# Patient Record
Sex: FEMALE | Race: ASIAN
[De-identification: names, ages, dates, MRNs, and addresses within clinical notes are randomized per-mention and may not be internally consistent; named-entity substitution may affect disease eponyms.]

---

## 2017-03-07 ENCOUNTER — TRANSCRIPTION ENCOUNTER (OUTPATIENT)
Age: 72
End: 2017-03-07

## 2018-02-01 ENCOUNTER — OUTPATIENT (OUTPATIENT)
Dept: OUTPATIENT SERVICES | Facility: HOSPITAL | Age: 73
LOS: 1 days | End: 2018-02-01
Payer: MEDICAID

## 2018-02-01 DIAGNOSIS — Z90.710 ACQUIRED ABSENCE OF BOTH CERVIX AND UTERUS: Chronic | ICD-10-CM

## 2018-02-01 PROCEDURE — G9001: CPT

## 2018-02-17 ENCOUNTER — INPATIENT (INPATIENT)
Facility: HOSPITAL | Age: 73
LOS: 2 days | Discharge: HOME HEALTH | End: 2018-02-20
Attending: HOSPITALIST | Admitting: HOSPITALIST
Payer: MEDICARE

## 2018-02-17 VITALS
WEIGHT: 209 LBS | RESPIRATION RATE: 22 BRPM | TEMPERATURE: 98 F | SYSTOLIC BLOOD PRESSURE: 172 MMHG | DIASTOLIC BLOOD PRESSURE: 65 MMHG | OXYGEN SATURATION: 100 % | HEART RATE: 80 BPM

## 2018-02-17 DIAGNOSIS — Z90.710 ACQUIRED ABSENCE OF BOTH CERVIX AND UTERUS: Chronic | ICD-10-CM

## 2018-02-17 LAB
ALBUMIN SERPL ELPH-MCNC: 3.8 G/DL — SIGNIFICANT CHANGE UP (ref 3.3–5)
ALP SERPL-CCNC: 110 U/L — SIGNIFICANT CHANGE UP (ref 40–120)
ALT FLD-CCNC: 20 U/L — SIGNIFICANT CHANGE UP (ref 12–78)
ANION GAP SERPL CALC-SCNC: 10 MMOL/L — SIGNIFICANT CHANGE UP (ref 5–17)
ANISOCYTOSIS BLD QL: SLIGHT — SIGNIFICANT CHANGE UP
APTT BLD: 41.5 SEC — HIGH (ref 27.5–37.4)
AST SERPL-CCNC: 17 U/L — SIGNIFICANT CHANGE UP (ref 15–37)
BASOPHILS # BLD AUTO: 0.1 K/UL — SIGNIFICANT CHANGE UP (ref 0–0.2)
BASOPHILS NFR BLD AUTO: 0.9 % — SIGNIFICANT CHANGE UP (ref 0–2)
BILIRUB SERPL-MCNC: 0.3 MG/DL — SIGNIFICANT CHANGE UP (ref 0.2–1.2)
BLD GP AB SCN SERPL QL: SIGNIFICANT CHANGE UP
BUN SERPL-MCNC: 20 MG/DL — SIGNIFICANT CHANGE UP (ref 7–23)
CALCIUM SERPL-MCNC: 9 MG/DL — SIGNIFICANT CHANGE UP (ref 8.5–10.1)
CHLORIDE SERPL-SCNC: 105 MMOL/L — SIGNIFICANT CHANGE UP (ref 96–108)
CO2 SERPL-SCNC: 24 MMOL/L — SIGNIFICANT CHANGE UP (ref 22–31)
CREAT SERPL-MCNC: 0.8 MG/DL — SIGNIFICANT CHANGE UP (ref 0.5–1.3)
ELLIPTOCYTES BLD QL SMEAR: SLIGHT — SIGNIFICANT CHANGE UP
EOSINOPHIL # BLD AUTO: 0.2 K/UL — SIGNIFICANT CHANGE UP (ref 0–0.5)
EOSINOPHIL NFR BLD AUTO: 1 % — SIGNIFICANT CHANGE UP (ref 0–6)
ETHANOL SERPL-MCNC: <10 MG/DL — SIGNIFICANT CHANGE UP (ref 0–10)
GLUCOSE SERPL-MCNC: 155 MG/DL — HIGH (ref 70–99)
HCT VFR BLD CALC: 42.8 % — SIGNIFICANT CHANGE UP (ref 34.5–45)
HGB BLD-MCNC: 12.5 G/DL — SIGNIFICANT CHANGE UP (ref 11.5–15.5)
HYPOCHROMIA BLD QL: SLIGHT — SIGNIFICANT CHANGE UP
INR BLD: 2.57 RATIO — HIGH (ref 0.88–1.16)
LYMPHOCYTES # BLD AUTO: 29 % — SIGNIFICANT CHANGE UP (ref 13–44)
LYMPHOCYTES # BLD AUTO: 6.7 K/UL — HIGH (ref 1–3.3)
MAGNESIUM SERPL-MCNC: 1.9 MG/DL — SIGNIFICANT CHANGE UP (ref 1.6–2.6)
MANUAL DIF COMMENT BLD-IMP: SIGNIFICANT CHANGE UP
MCHC RBC-ENTMCNC: 19.3 PG — LOW (ref 27–34)
MCHC RBC-ENTMCNC: 29.1 GM/DL — LOW (ref 32–36)
MCV RBC AUTO: 66.1 FL — LOW (ref 80–100)
MICROCYTES BLD QL: SLIGHT — SIGNIFICANT CHANGE UP
MONOCYTES # BLD AUTO: 0.8 K/UL — SIGNIFICANT CHANGE UP (ref 0–0.9)
MONOCYTES NFR BLD AUTO: 2 % — SIGNIFICANT CHANGE UP (ref 2–14)
NEUTROPHILS # BLD AUTO: 5.4 K/UL — SIGNIFICANT CHANGE UP (ref 1.8–7.4)
NEUTROPHILS NFR BLD AUTO: 35 % — LOW (ref 43–77)
PLAT MORPH BLD: NORMAL — SIGNIFICANT CHANGE UP
PLATELET # BLD AUTO: 292 K/UL — SIGNIFICANT CHANGE UP (ref 150–400)
POIKILOCYTOSIS BLD QL AUTO: SLIGHT — SIGNIFICANT CHANGE UP
POTASSIUM SERPL-MCNC: 3.3 MMOL/L — LOW (ref 3.5–5.3)
POTASSIUM SERPL-SCNC: 3.3 MMOL/L — LOW (ref 3.5–5.3)
PROT SERPL-MCNC: 7.7 GM/DL — SIGNIFICANT CHANGE UP (ref 6–8.3)
PROTHROM AB SERPL-ACNC: 28.3 SEC — HIGH (ref 9.8–12.7)
RBC # BLD: 6.47 M/UL — HIGH (ref 3.8–5.2)
RBC # FLD: 13.1 % — SIGNIFICANT CHANGE UP (ref 10.3–14.5)
RBC BLD AUTO: (no result)
SCHISTOCYTES BLD QL AUTO: SLIGHT — SIGNIFICANT CHANGE UP
SODIUM SERPL-SCNC: 139 MMOL/L — SIGNIFICANT CHANGE UP (ref 135–145)
TROPONIN I SERPL-MCNC: <0.015 NG/ML — SIGNIFICANT CHANGE UP (ref 0.01–0.04)
TYPE + AB SCN PNL BLD: SIGNIFICANT CHANGE UP
VARIANT LYMPHS # BLD: 33 % — HIGH (ref 0–6)
WBC # BLD: 13.2 K/UL — HIGH (ref 3.8–10.5)
WBC # FLD AUTO: 13.2 K/UL — HIGH (ref 3.8–10.5)

## 2018-02-17 PROCEDURE — 70496 CT ANGIOGRAPHY HEAD: CPT | Mod: 26

## 2018-02-17 PROCEDURE — 70450 CT HEAD/BRAIN W/O DYE: CPT | Mod: 26

## 2018-02-17 PROCEDURE — 71045 X-RAY EXAM CHEST 1 VIEW: CPT | Mod: 26

## 2018-02-17 PROCEDURE — 93010 ELECTROCARDIOGRAM REPORT: CPT

## 2018-02-17 NOTE — ED ADULT NURSE NOTE - OBJECTIVE STATEMENT
Pt presents to the ED with multiple symptoms. As per pt family at approx 8PM pt took nyquil and went to bed. Pt awoke and was having numbness to the hands bilaterally. Pt in route to ED states to family that she was having "difficulty speaking" but was able to verbalize the difficulty. Upon arrival to ED pt was unable to get out of car and slid when attempting to do so. Upon assessment pt right leg was weaker than the left proximally.

## 2018-02-17 NOTE — ED ADULT TRIAGE NOTE - CHIEF COMPLAINT QUOTE
family came in running asking for wheelchair, then ran passed triage trying to get into main ED.  as per family they believe patient is having a stroke.  daughter states pt has not been feeling well, took Nyquil this evening and approx. 30 min ago speech became unclear.  as per family pt comprehends everything you are saying.  pt directly bedded into main with Dr. Toribio at bedside.

## 2018-02-17 NOTE — ED PROVIDER NOTE - OBJECTIVE STATEMENT
72 y/o F with a PMhx of HTN, DVT on Coumadin presents to the ED with family at bedside who states that she recently took Nyquil 30mins PTA. Pt family states that her speech became difficult to understand, concerned that she was having a stroke, drove her to the ED. Upon ED arrival pt family was attempting to move her into a wheelchair when she lost her strength and was lowered to the ground. Pt unable to provide adequate hx at this time, language barrier present with no other acute c/o at this time.

## 2018-02-17 NOTE — ED PROVIDER NOTE - PROGRESS NOTE DETAILS
d/w dr cano, pt to have CTA to look for thrombus d/w dr cano, CTa shows no thrombus, will admit to HH d/w hospitalist, Dr Escobedo

## 2018-02-17 NOTE — ED ADULT NURSE REASSESSMENT NOTE - NS ED NURSE REASSESS COMMENT FT1
2242- pt stated that she was "feeling like I am losing the energy from my body." MD Toribio re-evaluated pt when having these complaints and updated family in regards to current status and plan of care. Pt family remained confused and MD Toribio re-explained the results and plan of care and family then verbalized understanding of information.   2252- MD Toribio spoke with pt family in regards to lab results at this time. MD Toribio on phone with Neurologist, MD Quintana, in regards to pt current status and results at this time. Pt to have CT following additional lab results. Pt remains on bedside monitor. Warm blankets provided.

## 2018-02-18 LAB
APPEARANCE UR: CLEAR — SIGNIFICANT CHANGE UP
BILIRUB UR-MCNC: NEGATIVE — SIGNIFICANT CHANGE UP
COLOR SPEC: YELLOW — SIGNIFICANT CHANGE UP
DIFF PNL FLD: NEGATIVE — SIGNIFICANT CHANGE UP
GLUCOSE UR QL: NEGATIVE MG/DL — SIGNIFICANT CHANGE UP
INR BLD: 2.88 RATIO — HIGH (ref 0.88–1.16)
KETONES UR-MCNC: NEGATIVE — SIGNIFICANT CHANGE UP
LEUKOCYTE ESTERASE UR-ACNC: NEGATIVE — SIGNIFICANT CHANGE UP
NITRITE UR-MCNC: NEGATIVE — SIGNIFICANT CHANGE UP
PH UR: 5 — SIGNIFICANT CHANGE UP (ref 5–8)
PROT UR-MCNC: NEGATIVE MG/DL — SIGNIFICANT CHANGE UP
PROTHROM AB SERPL-ACNC: 31.8 SEC — HIGH (ref 9.8–12.7)
SP GR SPEC: 1.01 — SIGNIFICANT CHANGE UP (ref 1.01–1.02)
TROPONIN I SERPL-MCNC: 0.07 NG/ML — HIGH (ref 0.01–0.04)
TROPONIN I SERPL-MCNC: 0.07 NG/ML — HIGH (ref 0.01–0.04)
TROPONIN I SERPL-MCNC: 0.08 NG/ML — HIGH (ref 0.01–0.04)
UROBILINOGEN FLD QL: NEGATIVE MG/DL — SIGNIFICANT CHANGE UP

## 2018-02-18 PROCEDURE — 99285 EMERGENCY DEPT VISIT HI MDM: CPT

## 2018-02-18 PROCEDURE — 99223 1ST HOSP IP/OBS HIGH 75: CPT

## 2018-02-18 PROCEDURE — 70498 CT ANGIOGRAPHY NECK: CPT | Mod: 26

## 2018-02-18 PROCEDURE — 70551 MRI BRAIN STEM W/O DYE: CPT | Mod: 26

## 2018-02-18 PROCEDURE — 71045 X-RAY EXAM CHEST 1 VIEW: CPT | Mod: 26

## 2018-02-18 PROCEDURE — 93010 ELECTROCARDIOGRAM REPORT: CPT

## 2018-02-18 RX ORDER — WARFARIN SODIUM 2.5 MG/1
2 TABLET ORAL DAILY
Qty: 0 | Refills: 0 | Status: DISCONTINUED | OUTPATIENT
Start: 2018-02-18 | End: 2018-02-18

## 2018-02-18 RX ORDER — ASPIRIN/CALCIUM CARB/MAGNESIUM 324 MG
81 TABLET ORAL DAILY
Qty: 0 | Refills: 0 | Status: DISCONTINUED | OUTPATIENT
Start: 2018-02-18 | End: 2018-02-20

## 2018-02-18 RX ORDER — ATORVASTATIN CALCIUM 80 MG/1
80 TABLET, FILM COATED ORAL AT BEDTIME
Qty: 0 | Refills: 0 | Status: DISCONTINUED | OUTPATIENT
Start: 2018-02-18 | End: 2018-02-20

## 2018-02-18 RX ORDER — PANTOPRAZOLE SODIUM 20 MG/1
40 TABLET, DELAYED RELEASE ORAL
Qty: 0 | Refills: 0 | Status: DISCONTINUED | OUTPATIENT
Start: 2018-02-18 | End: 2018-02-20

## 2018-02-18 RX ORDER — POTASSIUM CHLORIDE 20 MEQ
40 PACKET (EA) ORAL ONCE
Qty: 0 | Refills: 0 | Status: COMPLETED | OUTPATIENT
Start: 2018-02-18 | End: 2018-02-18

## 2018-02-18 RX ORDER — ATORVASTATIN CALCIUM 80 MG/1
80 TABLET, FILM COATED ORAL ONCE
Qty: 0 | Refills: 0 | Status: COMPLETED | OUTPATIENT
Start: 2018-02-18 | End: 2018-02-18

## 2018-02-18 RX ORDER — ASPIRIN/CALCIUM CARB/MAGNESIUM 324 MG
325 TABLET ORAL ONCE
Qty: 0 | Refills: 0 | Status: COMPLETED | OUTPATIENT
Start: 2018-02-18 | End: 2018-02-18

## 2018-02-18 RX ORDER — WARFARIN SODIUM 2.5 MG/1
2 TABLET ORAL DAILY
Qty: 0 | Refills: 0 | Status: DISCONTINUED | OUTPATIENT
Start: 2018-02-18 | End: 2018-02-20

## 2018-02-18 RX ORDER — ALPRAZOLAM 0.25 MG
0.25 TABLET ORAL ONCE
Qty: 0 | Refills: 0 | Status: DISCONTINUED | OUTPATIENT
Start: 2018-02-18 | End: 2018-02-18

## 2018-02-18 RX ADMIN — WARFARIN SODIUM 2 MILLIGRAM(S): 2.5 TABLET ORAL at 22:31

## 2018-02-18 RX ADMIN — Medication 40 MILLIEQUIVALENT(S): at 04:00

## 2018-02-18 RX ADMIN — Medication 81 MILLIGRAM(S): at 11:46

## 2018-02-18 RX ADMIN — ATORVASTATIN CALCIUM 80 MILLIGRAM(S): 80 TABLET, FILM COATED ORAL at 22:30

## 2018-02-18 RX ADMIN — Medication 0.25 MILLIGRAM(S): at 11:46

## 2018-02-18 RX ADMIN — PANTOPRAZOLE SODIUM 40 MILLIGRAM(S): 20 TABLET, DELAYED RELEASE ORAL at 08:43

## 2018-02-18 RX ADMIN — Medication 100 MILLIGRAM(S): at 22:30

## 2018-02-18 NOTE — PATIENT PROFILE ADULT. - REASON FOR ADMISSION
Patient stated she took Nyquil for cough, then her son came to see her and she could not get her words out, her tongue was numb and her hands and feet was numb.

## 2018-02-18 NOTE — SWALLOW BEDSIDE ASSESSMENT ADULT - COMMENTS
The patient was admitted to  with onset of right sided weakness and difficulties expressing self which are improving. Pt also noted to have hypokalemia and elevated troponins.  This profile is superimposed upon a history of a recent non nutritive cough for which  has been taking Nyquil, HTN, GERD, past DVT and prior stomach surgery NOS. The patient was admitted to  with onset of right sided weakness and difficulties expressing self which are improving. Pt also noted to have hypokalemia and elevated troponins.  This profile is superimposed upon a history of a recent non nutritive cough for which she has been taking Nyquil, HTN, GERD, past DVT and prior stomach surgery NOS.

## 2018-02-18 NOTE — SWALLOW BEDSIDE ASSESSMENT ADULT - SWALLOW EVAL: RECOMMENDED DIET
SUGGEST A REGULAR TEXTURE DIET WITH THIN LIQUID CONSISTENCIES AS PATIENT APPEARS TO TOLERATE SAME FROM AN OROPHARYNGEAL SWALLOWING STANCE ON EXAM.

## 2018-02-18 NOTE — PHYSICAL THERAPY INITIAL EVALUATION ADULT - DISCHARGE DISPOSITION, PT EVAL
no skilled PT needs/home/Pt. will be d/c from PT at present time due to performing transfers and ambulating w/ RW w/ supervision/independently.

## 2018-02-18 NOTE — CONSULT NOTE ADULT - SUBJECTIVE AND OBJECTIVE BOX
Patient is a 73y old  Female who presents with a chief complaint of Patient stated she took Nyquil for cough, then her son came to see her and she could not get her words out, her tongue was numb and her hands and feet was numb with Rt side body weak lasted for 1-2 hrs yseterday      HPI:  72 y/o F presented to the ED with family at bedside who states that she took Nyquil 30mins Prior to the event for dry cough. Pt family stated that her speech became difficult to understand, concerned that she was having a stroke, drove her to the ED. Upon ED arrival pt family was attempting to move her into a wheelchair when she lost her strength and was lowered to the ground. As per the patient while in route in car her right side of the body got weaker. Her right LE was worse then her right UE. Left side had no weakness.    weakness resolved later except persistent  she still feel slightly numb in the right lower leg area which resolved later. No bowel or bladder incontinence. She denies any chest pain, any sob or any headache. No recurrent sx today. OOB ambulated with PT. CT and CTA reported neg for acute pathology.     PMHx:  HTN  DVT of the LLE  GERD (18 Feb 2018 02:35)      PAST MEDICAL & SURGICAL HISTORY:  DVT (deep venous thrombosis)  HTN (hypertension)  S/P hysterectomy      FAMILY HISTORY:  No pertinent family history in first degree relatives      Social Hx:  Nonsmoker, no drug or alcohol use    MEDICATIONS  (STANDING):  aspirin  chewable 81 milliGRAM(s) Oral daily  atorvastatin 80 milliGRAM(s) Oral at bedtime  pantoprazole    Tablet 40 milliGRAM(s) Oral before breakfast  warfarin 2 milliGRAM(s) Oral daily       Allergies    penicillin (Rash)    ROS: Pertinent positives in HPI, all other ROS were reviewed and are negative.      Vital Signs Last 24 Hrs  T(C): 36.2 (18 Feb 2018 03:58), Max: 36.9 (18 Feb 2018 02:30)  T(F): 97.2 (18 Feb 2018 03:58), Max: 98.4 (18 Feb 2018 02:30)  HR: 57 (18 Feb 2018 08:00) (52 - 91)  BP: 142/60 (18 Feb 2018 08:00) (117/80 - 172/65)  BP(mean): 77 (18 Feb 2018 08:00) (75 - 104)  RR: 15 (18 Feb 2018 08:00) (13 - 26)  SpO2: 94% (18 Feb 2018 08:00) (94% - 100%)        Constitutional: awake and alert.  HEENT: PERRLA, EOMI,   Neck: Supple.  Respiratory: Breath sounds are clear bilaterally  Cardiovascular: S1 and S2, regular  rhythm  Gastrointestinal: soft, nontender  Extremities:  no edema  Vascular:  Carotid Bruit - no  Musculoskeletal: no joint swelling/tenderness, no abnormal movements  Skin: No rashes    Neurological exam:  HF: A x O x 3. Appropriately interactive, normal affect. Speech fluent, No Aphasia  speaks Divehi, but understands simple english.   CN: MONALISA, EOMI, VFF, facial sensation normal, no NLFD, tongue midline, gag intact.  Motor: No pronator drift, Strength 5/5 in all 4 ext, normal bulk and tone.    Sens: Intact to light touch .   Reflexes: Symmetric and normal . BJ 2+, BR 2+, KJ 2+, AJ 1+, downgoing toes b/l  Coord:  No dysmetria, MAINE intact   Gait/Balance: Normal  with assistance and supervision with PT    NIHSS: 0      Labs:   02-18    144  |  114<H>  |  15  ----------------------------<  87  4.1   |  23  |  0.59    Ca    8.4<L>      18 Feb 2018 07:56  Mg     2.0     02-18    TPro  7.7  /  Alb  3.8  /  TBili  0.3  /  DBili  x   /  AST  17  /  ALT  20  /  AlkPhos  110  02-17                              12.5   13.2  )-----------( 292      ( 17 Feb 2018 22:15 )             42.8       Radiology:  - CT Head:2/17/18    IMPRESSION:  No acute intracranial hemorrhage, mass effect from vasogenic edema or   evidence for large vascular territory infarct.    CTA Of BRAIN/CAROTIDS 2/17/18        IMPRESSION:    No acute intracranial pathology.    No vessel occlusion, aneurysm, or AVM in the brain.    No occlusion or hemodynamically significant stenosis in the neck.    >

## 2018-02-18 NOTE — SWALLOW BEDSIDE ASSESSMENT ADULT - ASR SWALLOW RECOMMEND DIAG
DEFER MBS AS PT APPEARED CLINICALLY TOLERANT OF SUGGESTED PO TEXTURES FROM AN OROPHARYNGEAL SWALLOWING STANCE ON CLINICAL EXAM.

## 2018-02-18 NOTE — H&P ADULT - NEUROLOGICAL DETAILS
deep reflexes intact/cranial nerves intact/responds to verbal commands/no spontaneous movement/responds to pain/normal strength/alert and oriented x 3/patchy sensory loss in right lower leg area.

## 2018-02-18 NOTE — H&P ADULT - ASSESSMENT
72 yo female presented with complain of slurry speech and right sided weakness.    A/P:    1.  TIA vs Acute Stroke  -patient could not get any TPA due to INR of 2.57  -clinically improved already  -will give Aspirin and statin  -follow clinically with neuro check in Monitored bed   -will follow Neurology consult  -CTA head and neck-normal  -follow Echo report  -follow Telemetry report  -follow PT eval and speech eval  -will keep the permissive HTN: Goal of SBP for the next 48 hours: 160-180 mm hg, may need to give IVF to maintain it     2.  Elevated troponin  -patient has no chest pain  -repeat EKG-no acute ST-T changes  -on aspirin and statin  -follow troponin trend   -will follow cardiology consult    3. h/o DVT  -continue coumadin  -follow INR  -goal of INR: 2-3     4. GERD: on pantoprazole.     5. Hypokalemia: will replace K

## 2018-02-18 NOTE — CONSULT NOTE ADULT - ASSESSMENT
72 yo female with H/o HTN, HLD ,DVT  presented with complain of slurry speech and right sided weakness.    TIA vs Acute Stroke  rapidly improved symptoms  -patient could not get any TPA due to INR of 2.57  - Agree with present w/u.  - Hypercoag w/u.  -Etiology for DVT/If mRI abnormal might need SHELLEY.  -Cardiology consult.  - will f/u.  - Discussed with Pt and

## 2018-02-18 NOTE — H&P ADULT - NSHPPHYSICALEXAM_GEN_ALL_CORE
Vital Signs Last 24 Hrs  T(C): 36.9 (18 Feb 2018 02:30), Max: 36.9 (18 Feb 2018 02:30)  T(F): 98.4 (18 Feb 2018 02:30), Max: 98.4 (18 Feb 2018 02:30)  HR: 60 (18 Feb 2018 02:30) (60 - 91)  BP: 135/66 (18 Feb 2018 02:30) (117/80 - 172/65)  BP(mean): --  RR: 20 (18 Feb 2018 02:30) (15 - 22)  SpO2: 98% (18 Feb 2018 02:30) (98% - 100%)

## 2018-02-18 NOTE — ED ADULT NURSE REASSESSMENT NOTE - NS ED NURSE REASSESS COMMENT FT1
Pt urinated with assistance, urine collected and sent as per MD order. Pt remains on bedside monitor. Pt had asked to stand stating she felt better, but due to fall risk and recent fall when entering ED pt used bedpan for safety, rationale explained to pt. Pt remains on bedside monitor, awaiting radiology results at this time. pt and family aware as to plan of care. Will continue to monitor.

## 2018-02-18 NOTE — SWALLOW BEDSIDE ASSESSMENT ADULT - SLP GENERAL OBSERVATIONS
Pt seen at chairside,. On encounter, pt was alert, interactive and able to verbalize during communicative probes. Her verbalizations were produced in Yakut and utterances were provided without evidence an overt primary motor speech or primary linguistic pathology. Note that pt did report a transient episode of word finding difficulties PTH that resolved,  At reported communicative baseline. Pt denied Dysphagia when asked.

## 2018-02-18 NOTE — SWALLOW BEDSIDE ASSESSMENT ADULT - SWALLOW EVAL: DIAGNOSIS
1) The patient demonstrates Oropharyngeal Swallowing which subjectively appears to be stable and within functional parameters for age. No behavioral aspiration signs were exhibited on exam. 2) Pt is alert, interactive and able to verbalize during communicative probes. Her verbalizations were produced in Divehi and utterances were provided without evidence an overt primary motor speech or primary linguistic pathology. At reported communicative baseline.

## 2018-02-18 NOTE — H&P ADULT - HISTORY OF PRESENT ILLNESS
74 y/o F presented to the ED with family at bedside who states that she took Nyquil 30mins Prior to the event for dry cough. Pt family stated that her speech became difficult to understand, concerned that she was having a stroke, drove her to the ED. Upon ED arrival pt family was attempting to move her into a wheelchair when she lost her strength and was lowered to the ground. As per the patient while in route in car her right side of the body got weaker. Her right LE was worse then her right UE. Left side had no weakness.   By the time I went to evaluate the patient her speech was normal and the weakness resolved. But she still feel slightly numb in the right lower leg area. No bowel or bladder incontinence. She denies any chest pain, any sob or any headache.     PMHx:  HTN  DVT of the LLE  GERD

## 2018-02-19 LAB
ANION GAP SERPL CALC-SCNC: 7 MMOL/L — SIGNIFICANT CHANGE UP (ref 5–17)
BASOPHILS # BLD AUTO: 0.1 K/UL — SIGNIFICANT CHANGE UP (ref 0–0.2)
BASOPHILS NFR BLD AUTO: 1.1 % — SIGNIFICANT CHANGE UP (ref 0–2)
BUN SERPL-MCNC: 22 MG/DL — SIGNIFICANT CHANGE UP (ref 7–23)
CALCIUM SERPL-MCNC: 8.2 MG/DL — LOW (ref 8.5–10.1)
CHLORIDE SERPL-SCNC: 112 MMOL/L — HIGH (ref 96–108)
CO2 SERPL-SCNC: 25 MMOL/L — SIGNIFICANT CHANGE UP (ref 22–31)
CREAT SERPL-MCNC: 0.62 MG/DL — SIGNIFICANT CHANGE UP (ref 0.5–1.3)
EOSINOPHIL # BLD AUTO: 0.2 K/UL — SIGNIFICANT CHANGE UP (ref 0–0.5)
EOSINOPHIL NFR BLD AUTO: 3.1 % — SIGNIFICANT CHANGE UP (ref 0–6)
GLUCOSE SERPL-MCNC: 85 MG/DL — SIGNIFICANT CHANGE UP (ref 70–99)
HCT VFR BLD CALC: 37.4 % — SIGNIFICANT CHANGE UP (ref 34.5–45)
HGB BLD-MCNC: 11 G/DL — LOW (ref 11.5–15.5)
INR BLD: 2.46 RATIO — HIGH (ref 0.88–1.16)
LYMPHOCYTES # BLD AUTO: 2.3 K/UL — SIGNIFICANT CHANGE UP (ref 1–3.3)
LYMPHOCYTES # BLD AUTO: 36 % — SIGNIFICANT CHANGE UP (ref 13–44)
MCHC RBC-ENTMCNC: 19.5 PG — LOW (ref 27–34)
MCHC RBC-ENTMCNC: 29.6 GM/DL — LOW (ref 32–36)
MCV RBC AUTO: 65.9 FL — LOW (ref 80–100)
MONOCYTES # BLD AUTO: 0.5 K/UL — SIGNIFICANT CHANGE UP (ref 0–0.9)
MONOCYTES NFR BLD AUTO: 7.5 % — SIGNIFICANT CHANGE UP (ref 2–14)
NEUTROPHILS # BLD AUTO: 3.3 K/UL — SIGNIFICANT CHANGE UP (ref 1.8–7.4)
NEUTROPHILS NFR BLD AUTO: 52.3 % — SIGNIFICANT CHANGE UP (ref 43–77)
PLATELET # BLD AUTO: 190 K/UL — SIGNIFICANT CHANGE UP (ref 150–400)
POTASSIUM SERPL-MCNC: 4 MMOL/L — SIGNIFICANT CHANGE UP (ref 3.5–5.3)
POTASSIUM SERPL-SCNC: 4 MMOL/L — SIGNIFICANT CHANGE UP (ref 3.5–5.3)
PROTHROM AB SERPL-ACNC: 27.1 SEC — HIGH (ref 9.8–12.7)
RBC # BLD: 5.67 M/UL — HIGH (ref 3.8–5.2)
RBC # FLD: 13 % — SIGNIFICANT CHANGE UP (ref 10.3–14.5)
SODIUM SERPL-SCNC: 144 MMOL/L — SIGNIFICANT CHANGE UP (ref 135–145)
WBC # BLD: 6.3 K/UL — SIGNIFICANT CHANGE UP (ref 3.8–10.5)
WBC # FLD AUTO: 6.3 K/UL — SIGNIFICANT CHANGE UP (ref 3.8–10.5)

## 2018-02-19 PROCEDURE — 99233 SBSQ HOSP IP/OBS HIGH 50: CPT

## 2018-02-19 RX ADMIN — PANTOPRAZOLE SODIUM 40 MILLIGRAM(S): 20 TABLET, DELAYED RELEASE ORAL at 07:53

## 2018-02-19 RX ADMIN — WARFARIN SODIUM 2 MILLIGRAM(S): 2.5 TABLET ORAL at 21:38

## 2018-02-19 RX ADMIN — Medication 81 MILLIGRAM(S): at 11:39

## 2018-02-19 RX ADMIN — ATORVASTATIN CALCIUM 80 MILLIGRAM(S): 80 TABLET, FILM COATED ORAL at 21:38

## 2018-02-19 NOTE — CONSULT NOTE ADULT - SUBJECTIVE AND OBJECTIVE BOX
Cardiology Consultation    HPI: 74 y/o F presented to the ED with family at bedside who states that she took Nyquil 30mins Prior to the event for dry cough. Pt family stated that her speech became difficult to understand, concerned that she was having a stroke, drove her to the ED. Upon ED arrival pt family was attempting to move her into a wheelchair when she lost her strength and was lowered to the ground. As per the patient while in route in car her right side of the body got weaker. Her right LE was worse then her right UE. Left side had no weakness. By the time I went to evaluate the patient her speech was normal and the weakness resolved. But she still feel slightly numb in the right lower leg area. No bowel or bladder incontinence. She denies any chest pain, any sob or any headache.     - No CP/SOB. No weakness, speech sounds clear. SR on tele.     PMHx:  HTN  DVT of the LLE  GERD (2018 02:35)    PAST MEDICAL & SURGICAL HISTORY:  DVT (deep venous thrombosis)  HTN (hypertension)  S/P hysterectomy    Allergies  penicillin (Rash)    Intolerances    SOCIAL HISTORY: Denies tobacco, etoh abuse or illicit drug use    FAMILY HISTORY:  No pertinent family history in first degree relatives    MEDICATIONS  (STANDING):  aspirin  chewable 81 milliGRAM(s) Oral daily  atorvastatin 80 milliGRAM(s) Oral at bedtime  pantoprazole    Tablet 40 milliGRAM(s) Oral before breakfast  warfarin 2 milliGRAM(s) Oral daily    MEDICATIONS  (PRN):  guaiFENesin    Syrup 100 milliGRAM(s) Oral every 6 hours PRN Cough      Vital Signs Last 24 Hrs  T(C): 36.2 (2018 05:42), Max: 36.6 (2018 23:38)  T(F): 97.1 (2018 05:42), Max: 97.9 (2018 23:38)  HR: 56 (2018 06:00) (51 - 88)  BP: 158/62 (2018 06:00) (110/62 - 162/57)  BP(mean): 81 (2018 06:00) (65 - 93)  RR: 17 (2018 11:00) (15 - 20)  SpO2: 94% (2018 23:00) (94% - 100%)    REVIEW OF SYSTEMS:    CONSTITUTIONAL:  As per HPI.  HEENT:  Eyes:  No diplopia or blurred vision. ENT:  No earache, sore throat or runny nose.  CARDIOVASCULAR:  No pressure, squeezing, strangling, tightness, heaviness or aching about the chest, neck, axilla or epigastrium.  RESPIRATORY:  No cough, shortness of breath, PND or orthopnea.  GASTROINTESTINAL:  No nausea, vomiting or diarrhea.  GENITOURINARY:  No dysuria, frequency or urgency.  MUSCULOSKELETAL:  As per HPI.  SKIN:  No change in skin, hair or nails.  NEUROLOGIC:  No paresthesias, fasciculations, seizures or weakness.  PSYCHIATRIC:  No disorder of thought or mood.  ENDOCRINE:  No heat or cold intolerance, polyuria or polydipsia.  HEMATOLOGICAL:  No easy bruising or bleedings.     PHYSICAL EXAMINATION:    GENERAL APPEARANCE:  Pt. is not currently dyspneic, in no distress. Pt. is alert, oriented, and pleasant.  HEENT:  Pupils are normal and react normally. No icterus. Mucous membranes well colored.  NECK:  Supple. No lymphadenopathy. Jugular venous pressure not elevated. Carotids equal.   HEART:   The cardiac impulse has a normal quality. There are no murmurs, rubs or gallops noted  CHEST:  Chest is clear to auscultation. Normal respiratory effort.  ABDOMEN:  Soft and nontender.   EXTREMITIES:  There is no edema.   SKIN:  No rash or significant lesions are noted.    I&O's Summary    2018 07:01  -  2018 07:00  --------------------------------------------------------  IN: 0 mL / OUT: 1 mL / NET: -1 mL    LABS:                        11.0   6.3   )-----------( 190      ( 2018 05:02 )             37.4     -    144  |  112<H>  |  22  ----------------------------<  85  4.0   |  25  |  0.62    Ca    8.2<L>      2018 05:02  Mg     2.0     02-18    TPro  7.7  /  Alb  3.8  /  TBili  0.3  /  DBili  x   /  AST  17  /  ALT  20  /  AlkPhos  110  -17    LIVER FUNCTIONS - ( 2018 22:15 )  Alb: 3.8 g/dL / Pro: 7.7 gm/dL / ALK PHOS: 110 U/L / ALT: 20 U/L / AST: 17 U/L / GGT: x           PT/INR - ( 2018 05:02 )   PT: 27.1 sec;   INR: 2.46 ratio         PTT - ( 2018 22:15 )  PTT:41.5 sec  CARDIAC MARKERS ( 2018 12:15 )  0.066 ng/mL / x     / x     / x     / x      CARDIAC MARKERS ( 2018 07:56 )  0.083 ng/mL / x     / x     / x     / x      CARDIAC MARKERS ( 2018 00:48 )  0.066 ng/mL / x     / x     / x     / x      CARDIAC MARKERS ( 2018 22:15 )  <0.015 ng/mL / x     / x     / x     / x          Urinalysis Basic - ( 2018 00:12 )    Color: Yellow / Appearance: Clear / S.010 / pH: x  Gluc: x / Ketone: Negative  / Bili: Negative / Urobili: Negative mg/dL   Blood: x / Protein: Negative mg/dL / Nitrite: Negative   Leuk Esterase: Negative / RBC: x / WBC x   Sq Epi: x / Non Sq Epi: x / Bacteria: x    EKG: SR @ 63, APCs, LVH with repolarization changes, TWI in anterolateral leads.    TELEMETRY: SR    CARDIAC TESTS: pending    RADIOLOGY & ADDITIONAL STUDIES: MRI negative    ASSESSMENT & PLAN:

## 2018-02-19 NOTE — CONSULT NOTE ADULT - ASSESSMENT
74 yo female presented with complain of slurry speech and right sided weakness.    1. TIA vs CVA- Neuro w/u ongoing. MRI negative, CTA neck negative. Cont ASA, statin. Pt already on Coumadin due to DVT. Pt may have had subtherapeutic time period causing TIA? Can consider changing to Xarelto 20mg daily. Neuro f/u pending. 2Decho pending. Will hold off on SHELLEY as pt is committed to anticoagulation anyway.     2. HTN- keep -160 systolic for now. Follow closely.     3. PT eval, speech eval.     4. +Troponin- No active CP. Cont asa/statin for now. 2Decho pending. Ishcemic eval with me upon discharge.     5. DVT- x 2 in the past. Hypercoaguable w/u pending. Consider changing to xarelto 20mg daily.     6. Replete lytes as needed.

## 2018-02-20 ENCOUNTER — TRANSCRIPTION ENCOUNTER (OUTPATIENT)
Age: 73
End: 2018-02-20

## 2018-02-20 VITALS — RESPIRATION RATE: 17 BRPM | HEART RATE: 60 BPM | SYSTOLIC BLOOD PRESSURE: 136 MMHG | DIASTOLIC BLOOD PRESSURE: 58 MMHG

## 2018-02-20 LAB
INR BLD: 2.65 RATIO — HIGH (ref 0.88–1.16)
PROTHROM AB SERPL-ACNC: 29.2 SEC — HIGH (ref 9.8–12.7)

## 2018-02-20 PROCEDURE — 93306 TTE W/DOPPLER COMPLETE: CPT | Mod: 26

## 2018-02-20 RX ORDER — ASPIRIN/CALCIUM CARB/MAGNESIUM 324 MG
1 TABLET ORAL
Qty: 0 | Refills: 0 | COMMUNITY
Start: 2018-02-20

## 2018-02-20 RX ORDER — ATORVASTATIN CALCIUM 80 MG/1
1 TABLET, FILM COATED ORAL
Qty: 30 | Refills: 0 | OUTPATIENT
Start: 2018-02-20

## 2018-02-20 RX ADMIN — PANTOPRAZOLE SODIUM 40 MILLIGRAM(S): 20 TABLET, DELAYED RELEASE ORAL at 10:05

## 2018-02-20 RX ADMIN — Medication 81 MILLIGRAM(S): at 10:05

## 2018-02-20 NOTE — DISCHARGE NOTE ADULT - NS AS DC FOLLOWUP STROKE INST
Stroke (includes: TIA/SAH/ICH/Ischemic Stroke)/Coumadin/Warfarin Stroke (includes: TIA/SAH/ICH/Ischemic Stroke)/Coumadin/Warfarin/Smoking Cessation

## 2018-02-20 NOTE — DISCHARGE NOTE ADULT - PATIENT PORTAL LINK FT
You can access the [facility] Patient Portal, offered by [facility], by registering with the following website: [email or web address]

## 2018-02-20 NOTE — DISCHARGE NOTE ADULT - PLAN OF CARE
medication and diet compliance, close follow up with Cardio/Neuro You possibly had a TIA.  The imaging of your head was normal showing no CVA (stroke).  Take meds including aspirin and statin as prescribed.   follow up with Neurology and Cardiology in office.  You need further cardiac workup in 1 week. med and diet compliance Check your blood pressure daily at home- follow up with your PMD within next wee  to calibrate your machine. INR goal 2-3 Continue coumadin.  Follow up with your primary physician for INR checks.

## 2018-02-20 NOTE — PROGRESS NOTE ADULT - SUBJECTIVE AND OBJECTIVE BOX
Consult Note:   · Provider Specialty	Neurology	    Referral/Consultation:   Initial Consult:  · Requested by Name:		  · Date/Time:	18-Feb-2018 10:28	  · Reason for Referral/Consultation:	R/o CVA/TIA	      · Subjective and Objective: 	  Patient is a 73y old  Female who presents with a chief complaint of Patient stated she took Nyquil for cough, then her son came to see her and she could not get her words out, her tongue was numb and her hands and feet was numb with Rt side body weak lasted for 1-2 hrs yseterday      HPI:  74 y/o F presented to the ED with family at bedside who states that she took Nyquil 30mins Prior to the event for dry cough. Pt family stated that her speech became difficult to understand, concerned that she was having a stroke, drove her to the ED. Upon ED arrival pt family was attempting to move her into a wheelchair when she lost her strength and was lowered to the ground. As per the patient while in route in car her right side of the body got weaker. Her right LE was worse then her right UE. Left side had no weakness.    weakness resolved later except persistent  she still feel slightly numb in the right lower leg area which resolved later. No bowel or bladder incontinence. She denies any chest pain, any sob or any headache. No recurrent sx today. OOB ambulated with PT. CT and CTA reported neg for acute pathology.     2/19/18  :  Pt feeling better. MRI did not show any acute pathology. waiting for cardiac w/u. Offers no c/o.     MEDICATIONS  (STANDING):  aspirin  chewable 81 milliGRAM(s) Oral daily  atorvastatin 80 milliGRAM(s) Oral at bedtime  pantoprazole    Tablet 40 milliGRAM(s) Oral before breakfast  warfarin 2 milliGRAM(s) Oral daily      Vital Signs Last 24 Hrs  T(C): 36.2 (19 Feb 2018 05:42), Max: 36.6 (18 Feb 2018 23:38)  T(F): 97.1 (19 Feb 2018 05:42), Max: 97.9 (18 Feb 2018 23:38)  HR: 62 (19 Feb 2018 09:21) (51 - 88)  BP: 110/47 (19 Feb 2018 09:21) (110/47 - 162/57)  BP(mean): 61 (19 Feb 2018 09:21) (61 - 93)  RR: 15 (19 Feb 2018 09:21) (15 - 20)  SpO2: 94% (18 Feb 2018 23:00) (94% - 100%)      Constitutional: awake and alert.  HEENT: PERRLA, EOMI,   Neck: Supple.  Respiratory: Breath sounds are clear bilaterally  Cardiovascular: S1 and S2, regular  rhythm  Gastrointestinal: soft, nontender  Extremities:  no edema  Vascular:  Carotid Bruit - no  Musculoskeletal: no joint swelling/tenderness, no abnormal movements  Skin: No rashes    Neurological exam:  HF: A x O x 3. Appropriately interactive, normal affect. Speech fluent, No Aphasia  speaks Pashto, but understands simple english.   CN: MONALISA, EOMI, VFF, facial sensation normal, no NLFD, tongue midline, gag intact.  Motor: No pronator drift, Strength 5/5 in all 4 ext, normal bulk and tone.    Sens: Intact to light touch .   Reflexes: Symmetric and normal . BJ 2+, BR 2+, KJ 2+, AJ 1+, downgoing toes b/l  Coord:  No dysmetria, MAINE intact   Gait/Balance: Normal  with assistance and supervision with PT      NIHSS: 0                    11.0   6.3   )-----------( 190      ( 19 Feb 2018 05:02 )             37.4     02-19    144  |  112<H>  |  22  ----------------------------<  85  4.0   |  25  |  0.62    Ca    8.2<L>      19 Feb 2018 05:02  Mg     2.0     02-18    TPro  7.7  /  Alb  3.8  /  TBili  0.3  /  DBili  x   /  AST  17  /  ALT  20  /  AlkPhos  110  02-17          Radiology report:  - CT Head 2/17/18    IMPRESSION:  No acute intracranial hemorrhage, mass effect from vasogenic edema or   evidence for large vascular territory infarct.    CTA Of BRAIN/CAROTIDS 2/17/18        IMPRESSION:    No acute intracranial pathology.    No vessel occlusion, aneurysm, or AVM in the brain.    No occlusion or hemodynamically significant stenosis in the neck.    - MRI brain 2/18/18    IMPRESSION:    Normal brain
Cardiology Progress Note     HPI: 74 y/o F presented to the ED with family at bedside who states that she took Nyquil 30mins Prior to the event for dry cough. Pt family stated that her speech became difficult to understand, concerned that she was having a stroke, drove her to the ED. Upon ED arrival pt family was attempting to move her into a wheelchair when she lost her strength and was lowered to the ground. As per the patient while in route in car her right side of the body got weaker. Her right LE was worse then her right UE. Left side had no weakness. By the time I went to evaluate the patient her speech was normal and the weakness resolved. But she still feel slightly numb in the right lower leg area. No bowel or bladder incontinence. She denies any chest pain, any sob or any headache.     - No CP/SOB. No weakness, speech sounds clear. SR on tele.     - SR on tele. No CP/SOB. +HA, fatigue.     PMHx:  HTN  DVT of the LLE  GERD (2018 02:35)    PAST MEDICAL & SURGICAL HISTORY:  DVT (deep venous thrombosis)  HTN (hypertension)  S/P hysterectomy    Allergies  penicillin (Rash)    Intolerances    SOCIAL HISTORY: Denies tobacco, etoh abuse or illicit drug use    FAMILY HISTORY:  No pertinent family history in first degree relatives    MEDICATIONS  (STANDING):  aspirin  chewable 81 milliGRAM(s) Oral daily  atorvastatin 80 milliGRAM(s) Oral at bedtime  pantoprazole    Tablet 40 milliGRAM(s) Oral before breakfast  warfarin 2 milliGRAM(s) Oral daily    MEDICATIONS  (PRN):  guaiFENesin    Syrup 100 milliGRAM(s) Oral every 6 hours PRN Cough      Vital Signs Last 24 Hrs  T(C): 36.2 (2018 05:42), Max: 36.6 (2018 23:38)  T(F): 97.1 (2018 05:42), Max: 97.9 (2018 23:38)  HR: 56 (2018 06:00) (51 - 88)  BP: 158/62 (2018 06:00) (110/62 - 162/57)  BP(mean): 81 (2018 06:00) (65 - 93)  RR: 17 (2018 11:00) (15 - 20)  SpO2: 94% (2018 23:00) (94% - 100%)    REVIEW OF SYSTEMS:    CONSTITUTIONAL:  As per HPI.  HEENT:  Eyes:  No diplopia or blurred vision. ENT:  No earache, sore throat or runny nose.  CARDIOVASCULAR:  No pressure, squeezing, strangling, tightness, heaviness or aching about the chest, neck, axilla or epigastrium.  RESPIRATORY:  No cough, shortness of breath, PND or orthopnea.  GASTROINTESTINAL:  No nausea, vomiting or diarrhea.  GENITOURINARY:  No dysuria, frequency or urgency.  MUSCULOSKELETAL:  As per HPI.  SKIN:  No change in skin, hair or nails.  NEUROLOGIC:  No paresthesias, fasciculations, seizures or weakness.  PSYCHIATRIC:  No disorder of thought or mood.  ENDOCRINE:  No heat or cold intolerance, polyuria or polydipsia.  HEMATOLOGICAL:  No easy bruising or bleedings.     PHYSICAL EXAMINATION:    GENERAL APPEARANCE:  Pt. is not currently dyspneic, in no distress. Pt. is alert, oriented, and pleasant.  HEENT:  Pupils are normal and react normally. No icterus. Mucous membranes well colored.  NECK:  Supple. No lymphadenopathy. Jugular venous pressure not elevated. Carotids equal.   HEART:   The cardiac impulse has a normal quality. There are no murmurs, rubs or gallops noted  CHEST:  Chest is clear to auscultation. Normal respiratory effort.  ABDOMEN:  Soft and nontender.   EXTREMITIES:  There is no edema.   SKIN:  No rash or significant lesions are noted.    I&O's Summary    2018 07:01  -  2018 07:00  --------------------------------------------------------  IN: 0 mL / OUT: 1 mL / NET: -1 mL    LABS:                        11.0   6.3   )-----------( 190      ( 2018 05:02 )             37.4     -    144  |  112<H>  |  22  ----------------------------<  85  4.0   |  25  |  0.62    Ca    8.2<L>      2018 05:02  Mg     2.0     02-18    TPro  7.7  /  Alb  3.8  /  TBili  0.3  /  DBili  x   /  AST  17  /  ALT  20  /  AlkPhos  110  02-17    LIVER FUNCTIONS - ( 2018 22:15 )  Alb: 3.8 g/dL / Pro: 7.7 gm/dL / ALK PHOS: 110 U/L / ALT: 20 U/L / AST: 17 U/L / GGT: x           PT/INR - ( 2018 05:02 )   PT: 27.1 sec;   INR: 2.46 ratio         PTT - ( 2018 22:15 )  PTT:41.5 sec  CARDIAC MARKERS ( 2018 12:15 )  0.066 ng/mL / x     / x     / x     / x      CARDIAC MARKERS ( 2018 07:56 )  0.083 ng/mL / x     / x     / x     / x      CARDIAC MARKERS ( 2018 00:48 )  0.066 ng/mL / x     / x     / x     / x      CARDIAC MARKERS ( 2018 22:15 )  <0.015 ng/mL / x     / x     / x     / x          Urinalysis Basic - ( 2018 00:12 )    Color: Yellow / Appearance: Clear / S.010 / pH: x  Gluc: x / Ketone: Negative  / Bili: Negative / Urobili: Negative mg/dL   Blood: x / Protein: Negative mg/dL / Nitrite: Negative   Leuk Esterase: Negative / RBC: x / WBC x   Sq Epi: x / Non Sq Epi: x / Bacteria: x    EKG: SR @ 63, APCs, LVH with repolarization changes, TWI in anterolateral leads.    TELEMETRY: SR    CARDIAC TESTS: pending    RADIOLOGY & ADDITIONAL STUDIES: MRI negative    ASSESSMENT & PLAN:
HOSPITALIST ATTENDING PROGRESS NOTE    Chart and meds reviewed.  Patient seen and examined.    CC: slurred speech and right sided weakness.    HPI:  72 y/o F presented to the ED with family at bedside who states that she took Nyquil 30mins Prior to the event for dry cough. Pt family stated that her speech became difficult to understand, concerned that she was having a stroke, drove her to the ED. Upon ED arrival pt family was attempting to move her into a wheelchair when she lost her strength and was lowered to the ground. As per the patient while in route in car her right side of the body got weaker. Her right LE was worse then her right UE. Left side had no weakness.   By the time I went to evaluate the patient her speech was normal and the weakness resolved. But she still feel slightly numb in the right lower leg area. No bowel or bladder incontinence. She denies any chest pain, any sob or any headache.     18: feeling well, working with physical therapy. offers no complaints.      REVIEW OF SYSTEMS:    CONSTITUTIONAL: No weakness, fevers or chills  EYES/ENT: No visual changes;  No vertigo or throat pain   NECK: No pain or stiffness  RESPIRATORY: No cough, wheezing, hemoptysis; No shortness of breath  CARDIOVASCULAR: No chest pain or palpitations  GASTROINTESTINAL: No abdominal or epigastric pain. No nausea, vomiting, or hematemesis; No diarrhea or constipation. No melena or hematochezia.  GENITOURINARY: No dysuria, frequency or hematuria  NEUROLOGICAL: No numbness or weakness  SKIN: No itching, burning, rashes, or lesions   All other review of systems is negative unless indicated above        MEDICATIONS  (STANDING):  aspirin  chewable 81 milliGRAM(s) Oral daily  atorvastatin 80 milliGRAM(s) Oral at bedtime  pantoprazole    Tablet 40 milliGRAM(s) Oral before breakfast  warfarin 2 milliGRAM(s) Oral daily    MEDICATIONS  (PRN):      VITALS:  T(F): 97.2 (18 @ 03:58), Max: 98.4 (18 @ 02:30)  HR: 57 (18 @ 08:00) (52 - 91)  BP: 142/60 (18 @ 08:00) (117/80 - 172/65)  RR: 15 (18 @ 08:00) (13 - 26)  SpO2: 94% (18 @ 08:00) (94% - 100%)  Wt(kg): --    I&O's Summary    2018 07:01  -  2018 07:00  --------------------------------------------------------  IN: 0 mL / OUT: 1 mL / NET: -1 mL        CAPILLARY BLOOD GLUCOSE      POCT Blood Glucose.: 159 mg/dL (2018 22:09)      PHYSICAL EXAM:    HEENT:  pupils equal and reactive, EOMI, no oropharyngeal lesions, erythema, exudates, oral thrush    NECK:   supple, no carotid bruits, no palpable lymph nodes, no thyromegaly    CV:  +S1, +S2, regular, no murmurs or rubs    RESP:   lungs clear to auscultation bilaterally, no wheezing, rales, rhonchi, good air entry bilaterally    BREAST:  not examined    GI:  abdomen soft, non-tender, non-distended, normal BS, no bruits, no abdominal masses, no palpable masses    RECTAL:  not examined    :  not examined    MSK:   normal muscle tone, no atrophy, no rigidity, no contractions    EXT:   no clubbing, no cyanosis, no edema, no calf pain, swelling or erythema    VASCULAR:  pulses equal and symmetric in the upper and lower extremities    NEURO:  AAOX3, no focal neurological deficits, follows all commands, able to move extremities spontaneously    SKIN:  no ulcers, lesions or rashes    LABS:    T(C): 36.2 (18 @ 03:58), Max: 36.9 (18 @ 02:30)  HR: 57 (18 @ 08:00) (52 - 91)  BP: 142/60 (18 @ 08:00) (117/80 - 172/65)  RR: 15 (18 @ 08:00) (13 - 26)  SpO2: 94% (18 @ 08:00) (94% - 100%)  Wt(kg): --    CARDIAC MARKERS ( 2018 07:56 )  0.083 ng/mL / x     / x     / x     / x      CARDIAC MARKERS ( 2018 00:48 )  0.066 ng/mL / x     / x     / x     / x      CARDIAC MARKERS ( 2018 22:15 )  <0.015 ng/mL / x     / x     / x     / x                                12.5   13.2  )-----------( 292      ( 2018 22:15 )             42.8     2018 07:56    144    |  114    |  15     ----------------------------<  87     4.1     |  23     |  0.59     Ca    8.4        2018 07:56  Mg     2.0       2018 07:56    TPro  7.7    /  Alb  3.8    /  TBili  0.3    /  DBili  x      /  AST  17     /  ALT  20     /  AlkPhos  110    2018 22:15    PT/INR - ( 2018 04:44 )   PT: 31.8 sec;   INR: 2.88 ratio         PTT - ( 2018 22:15 )  PTT:41.5 sec  CAPILLARY BLOOD GLUCOSE      POCT Blood Glucose.: 159 mg/dL (2018 22:09)    LIVER FUNCTIONS - ( 2018 22:15 )  Alb: 3.8 g/dL / Pro: 7.7 gm/dL / ALK PHOS: 110 U/L / ALT: 20 U/L / AST: 17 U/L / GGT: x           Urinalysis Basic - ( 2018 00:12 )    Color: Yellow / Appearance: Clear / S.010 / pH: x  Gluc: x / Ketone: Negative  / Bili: Negative / Urobili: Negative mg/dL   Blood: x / Protein: Negative mg/dL / Nitrite: Negative   Leuk Esterase: Negative / RBC: x / WBC x   Sq Epi: x / Non Sq Epi: x / Bacteria: x        Blood, Urine: Negative ( @ 00:12)        CULTURES:  Blood, Urine: Negative ( @ 00:12)
cc: slurred speech, right side weakness  hpi: 73y female w/ PMH DVT LLE, HTN, GERD presents w/ slurred speech and right side weakness.  Pt feeling better toda.  Tired.   Wants to go home.  Discussed plan w/ pt and daughter.     ros- fatigue; no weakness or slurred speech; 10pt ros negative     Vital Signs Last 24 Hrs  T(C): 36.2 (19 Feb 2018 05:42), Max: 36.6 (18 Feb 2018 23:38)  T(F): 97.1 (19 Feb 2018 05:42), Max: 97.9 (18 Feb 2018 23:38)  HR: 66 (19 Feb 2018 10:00) (51 - 88)  BP: 138/48 (19 Feb 2018 10:00) (110/47 - 162/57)  BP(mean): 62 (19 Feb 2018 10:00) (61 - 93)  RR: 13 (19 Feb 2018 10:00) (13 - 17)  SpO2: 94% (18 Feb 2018 23:00) (94% - 100%)      PHYSICAL EXAM:  General: NAD, comfortable  Neuro: AAOx3, no focal deficits  HEENT: NCAT, PERRLA, EOMI,   Neck: Soft and supple, No JVD  Respiratory: CTA b/l, no w/r/r  Cardiovascular: S1 and S2, RRR, no m/g/r  Gastrointestinal: +BS, soft, NTND, no rebound/guarding  Extremities: No c/c/e  Vascular: 2+ peripheral pulses  Musculoskeletal: 5/5 strength b/l UE and LE, sensation intact      LABS: All Labs Reviewed:                        11.0   6.3   )-----------( 190      ( 19 Feb 2018 05:02 )             37.4     02-19    144  |  112<H>  |  22  ----------------------------<  85  4.0   |  25  |  0.62    Ca    8.2<L>      19 Feb 2018 05:02  Mg     2.0     02-18    TPro  7.7  /  Alb  3.8  /  TBili  0.3  /  DBili  x   /  AST  17  /  ALT  20  /  AlkPhos  110  02-17    PT/INR - ( 19 Feb 2018 05:02 )   PT: 27.1 sec;   INR: 2.46 ratio         PTT - ( 17 Feb 2018 22:15 )  PTT:41.5 sec  CARDIAC MARKERS ( 18 Feb 2018 12:15 )  0.066 ng/mL / x     / x     / x     / x      CARDIAC MARKERS ( 18 Feb 2018 07:56 )  0.083 ng/mL / x     / x     / x     / x      CARDIAC MARKERS ( 18 Feb 2018 00:48 )  0.066 ng/mL / x     / x     / x     / x      CARDIAC MARKERS ( 17 Feb 2018 22:15 )  <0.015 ng/mL / x     / x     / x     / x        < from: MR Head No Cont (02.18.18 @ 12:53) >  IMPRESSION:    Normal brain      < end of copied text >      MEDICATIONS  (STANDING):  aspirin  chewable 81 milliGRAM(s) Oral daily  atorvastatin 80 milliGRAM(s) Oral at bedtime  pantoprazole    Tablet 40 milliGRAM(s) Oral before breakfast  warfarin 2 milliGRAM(s) Oral daily    MEDICATIONS  (PRN):  guaiFENesin    Syrup 100 milliGRAM(s) Oral every 6 hours PRN Cough    Assessment and Plan:   73y female w/     *TIA   - no TPA b/c inr 2.57 on coumadin  - Sx resolved  - CTA head/neck and MRI brain unremarkable  - NSR on tele  - echo pending  - continue aspirin, statin  - Neuro, Cardio f/u appreciated     *elevated trop  - no chest pain  - echo pending  - Cardio f/u appreciated- outpt ischemic eval  - aspirin, statin    *hx dvt  - INR therapeutic on coumadin
cc: slurred speech, right side weakness  hpi: 73y female w/ PMH DVT LLE, HTN, GERD presents w/ slurred speech and right side weakness.  Pt feeling better today.  Tired.   Wants to go home.  Discussed plan w/ pt and daughter.     2/20- feeling well when seen this morning.  Daughter at bedside translating. Pt's daughter would like echo done prior to d/c as she is concerned something may be missed.  Discussed that is normal pt can be d/c home today.  Pt and family would like home care.  Requesting PT re eval for steps as well as Nutrition eval.  Reviewed imaging, labs, meds and need for close follow up.  Pt ambulated w/ PT yesterday.     ros- fatigue; no weakness or slurred speech; 10pt ros negative     Vital Signs Last 24 Hrs  T(C): 36.4 (20 Feb 2018 12:00), Max: 36.4 (20 Feb 2018 12:00)  T(F): 97.5 (20 Feb 2018 12:00), Max: 97.5 (20 Feb 2018 12:00)  HR: 58 (20 Feb 2018 12:00) (55 - 76)  BP: 143/48 (20 Feb 2018 12:00) (109/88 - 153/96)  BP(mean): 74 (20 Feb 2018 12:00) (61 - 107)  RR: 17 (20 Feb 2018 12:00) (11 - 26)  SpO2: --96% on room air    PHYSICAL EXAM:  General: NAD, comfortably seated in chair, non acutely ill appearing  Neuro: AAOx3, no focal deficits  HEENT: NCAT, PERRLA, EOMI, MMM  Neck: Soft and supple, No JVD  Respiratory: CTA b/l, no w/r/r  Cardiovascular: S1 and S2, RRR, no m/g/r  Gastrointestinal: +BS, soft, NTND, no rebound/guarding  Extremities: No c/c/e  Vascular: 2+ peripheral pulses  Musculoskeletal: 5/5 strength b/l UE and LE, sensation intact      LABS: All Labs Reviewed:                        11.0   6.3   )-----------( 190      ( 19 Feb 2018 05:02 )             37.4     02-19    144  |  112<H>  |  22  ----------------------------<  85  4.0   |  25  |  0.62    Ca    8.2<L>      19 Feb 2018 05:02      PT/INR - ( 20 Feb 2018 04:38 )   PT: 29.2 sec;   INR: 2.65 ratio                               11.0   6.3   )-----------( 190      ( 19 Feb 2018 05:02 )             37.4     02-19    144  |  112<H>  |  22  ----------------------------<  85  4.0   |  25  |  0.62    Ca    8.2<L>      19 Feb 2018 05:02  Mg     2.0     02-18    TPro  7.7  /  Alb  3.8  /  TBili  0.3  /  DBili  x   /  AST  17  /  ALT  20  /  AlkPhos  110  02-17    PT/INR - ( 19 Feb 2018 05:02 )   PT: 27.1 sec;   INR: 2.46 ratio         PTT - ( 17 Feb 2018 22:15 )  PTT:41.5 sec  CARDIAC MARKERS ( 18 Feb 2018 12:15 )  0.066 ng/mL / x     / x     / x     / x      CARDIAC MARKERS ( 18 Feb 2018 07:56 )  0.083 ng/mL / x     / x     / x     / x      CARDIAC MARKERS ( 18 Feb 2018 00:48 )  0.066 ng/mL / x     / x     / x     / x      CARDIAC MARKERS ( 17 Feb 2018 22:15 )  <0.015 ng/mL / x     / x     / x     / x        < from: MR Head No Cont (02.18.18 @ 12:53) >  IMPRESSION:    Normal brain      < end of copied text >        MEDICATIONS  (STANDING):  aspirin  chewable 81 milliGRAM(s) Oral daily  atorvastatin 80 milliGRAM(s) Oral at bedtime  pantoprazole    Tablet 40 milliGRAM(s) Oral before breakfast  warfarin 2 milliGRAM(s) Oral daily    MEDICATIONS  (PRN):  guaiFENesin    Syrup 100 milliGRAM(s) Oral every 6 hours PRN Cough      Assessment and Plan:   73y female w/     *possible TIA   - no TPA b/c inr 2.57 on coumadin  - Sx resolved and no recurrence   - CTA head/neck and MRI brain unremarkable  - NSR on tele  - echo pending official   - continue aspirin, statin  - Neuro, Cardio f/u appreciated   2/20- Nutrition eval , PT eval requested by daughter prior to possible d/c if Echo normal     *elevated trop  - no chest pain  - echo pending  - Cardio f/u appreciated- outpt ischemic eval  - aspirin, statin    *hx dvt  - INR therapeutic on coumadin-     Stable for d/c home w/ home care if Echo normal.  Discussed need for close f/u w/ Neuro, Cardio and PMD, Dr. Davidson.

## 2018-02-20 NOTE — DISCHARGE NOTE ADULT - HOSPITAL COURSE
Vital Signs Last 24 Hrs  T(C): 36.4 (20 Feb 2018 12:00), Max: 36.4 (20 Feb 2018 12:00)  T(F): 97.5 (20 Feb 2018 12:00), Max: 97.5 (20 Feb 2018 12:00)  HR: 58 (20 Feb 2018 12:00) (55 - 76)  BP: 143/48 (20 Feb 2018 12:00) (109/88 - 153/96)  BP(mean): 74 (20 Feb 2018 12:00) (61 - 107)  RR: 17 (20 Feb 2018 12:00) (11 - 26)  SpO2: --96% on room air    ABS: All Labs Reviewed:                        11.0   6.3   )-----------( 190      ( 19 Feb 2018 05:02 )             37.4     02-19    144  |  112<H>  |  22  ----------------------------<  85  4.0   |  25  |  0.62    Ca    8.2<L>      19 Feb 2018 05:02    PTT - ( 17 Feb 2018 22:15 )  PTT:41.5 sec  CARDIAC MARKERS ( 18 Feb 2018 12:15 )  0.066 ng/mL / x     / x     / x     / x      CARDIAC MARKERS ( 18 Feb 2018 07:56 )  0.083 ng/mL / x     / x     / x     / x      CARDIAC MARKERS ( 18 Feb 2018 00:48 )  0.066 ng/mL / x     / x     / x     / x      CARDIAC MARKERS ( 17 Feb 2018 22:15 )  <0.015 ng/mL / x     / x     / x     / x        < from: MR Head No Cont (02.18.18 @ 12:53) >  IMPRESSION:  Normal brain    < end of copied text >    < from: CT Angio Neck w/ IV Cont (02.18.18 @ 00:34) >  IMPRESSION:    No acute intracranial pathology.    No vessel occlusion, aneurysm, or AVM in the brain.    No occlusion or hemodynamically significant stenosis in the neck.    < end of copied text >    Assessment and Plan:   73y female w/   *possible TIA   - no TPA b/c inr 2.57 on coumadin  - Sx resolved and no recurrence   - CTA head/neck and MRI brain unremarkable  - NSR on tele  - echo pending official   - continue aspirin, statin  - Neuro, Cardio f/u appreciated   2/20- Nutrition eval , PT eval requested prior to possible d/c     *elevated trop  - no chest pain  - echo pending  - Cardio f/u appreciated- outpt ischemic eval  - aspirin, statin    *hx dvt  - INR therapeutic on coumadin- f/u w/ PMD for INR checks

## 2018-02-20 NOTE — DISCHARGE NOTE ADULT - CARE PROVIDERS DIRECT ADDRESSES
,DirectAddress_Unknown,[email or web address],DirectAddress_Unknown

## 2018-02-20 NOTE — DISCHARGE NOTE ADULT - CARE PROVIDER_API CALL
Wander Davidson), Internal Medicine  [address] [place] [address]  Phone: [phone]  Fax: [phone]    Amita Quintana), Neurology  General  [address] [place] [address]  Phone: [phone]  Fax: [phone]    Daniela Baker (TIN), Cardiovascular Disease; Internal Medicine  [address] [place] [address]  Phone: [phone]  Fax: [phone]

## 2018-02-20 NOTE — DISCHARGE NOTE ADULT - MEDICATION SUMMARY - MEDICATIONS TO TAKE
I will START or STAY ON the medications listed below when I get home from the hospital:    aspirin 81 mg oral tablet, chewable  -- 1 tab(s) by mouth once a day  -- Indication: For TIA    enalapril 10 mg oral tablet  -- 1 tab(s) by mouth once a day  -- Indication: For Home med    Coumadin 2 mg oral tablet  -- 1 tab(s) by mouth once a day  -- Indication: For Home med     atorvastatin 80 mg oral tablet  -- 1 tab(s) by mouth once a day (at bedtime)  -- Indication: For Home med    amLODIPine 5 mg oral tablet  -- 1 tab(s) by mouth once a day  -- Indication: For Home med    hydroCHLOROthiazide 12.5 mg oral tablet  -- 1 tab(s) by mouth once a day  -- Indication: For Home med

## 2018-02-20 NOTE — DISCHARGE NOTE ADULT - CARE PLAN
Principal Discharge DX:	TIA (transient ischemic attack)  Goal:	medication and diet compliance, close follow up with Cardio/Neuro  Assessment and plan of treatment:	You possibly had a TIA.  The imaging of your head was normal showing no CVA (stroke).  Take meds including aspirin and statin as prescribed.   follow up with Neurology and Cardiology in office.  You need further cardiac workup in 1 week.  Secondary Diagnosis:	HTN (hypertension)  Goal:	med and diet compliance  Assessment and plan of treatment:	Check your blood pressure daily at home- follow up with your PMD within next wee  to calibrate your machine.  Secondary Diagnosis:	DVT (deep venous thrombosis)  Goal:	INR goal 2-3  Assessment and plan of treatment:	Continue coumadin.  Follow up with your primary physician for INR checks.

## 2018-02-20 NOTE — DISCHARGE NOTE ADULT - DURABLE MEDICAL EQUIPMENT AGENCY
[facility] SURGICAL [phone].   Ordered a  [employer] for home delivery.

## 2018-02-20 NOTE — PROGRESS NOTE ADULT - ASSESSMENT
· Assessment		  74 yo female with H/o HTN, HLD ,DVT  presented with complain of  transient slurry speech and right sided weakness.    TIA vs Acute Stroke  rapidly improved symptoms  -patient could not get any TPA due to INR of 2.57  - Agree with present w/u.  - Hypercoag w/u.  MRI neg   -TTE pending.  -Cardiology f/u  - will f/u.  - Discussed with Pt's daughter and .
74 yo female presented with complain of slurry speech and right sided weakness.    1. TIA vs CVA- Neuro w/u ongoing. MRI negative, CTA neck negative. Cont ASA, statin. 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 Pt may have had subtherapeutic time period causing TIA? Can consider changing to Xarelto 20mg daily. Neuro f/u pending. 2Decho pending. Will hold off on SHELLEY as pt is committed to anticoagulation anyway.     2. HTN- keep -160 systolic for now. Follow closely.     3. PT eval, speech eval.     4. +Troponin- No active CP. Cont asa/statin for now. 2Decho pending. Ishcemic eval with me upon discharge.     5. DVT- x 2 in the past. Hypercoaguable w/u pending. Consider changing to xarelto 20mg daily.     6. Replete lytes as needed.
74 yo female presented with complain of slurry speech and right sided weakness.    A/P:    1.  TIA vs Acute Stroke  -patient could not get any TPA due to INR of 2.57  -clinically improved already  -will give Aspirin and statin  -follow clinically with neuro check q4h in Monitored bed   -will follow Neurology consult  -CTA head and neck-normal  -MRI Brain today  -follow Echo report  -follow Telemetry report  -follow PT eval and speech eval  -will keep the permissive HTN: Goal of SBP for the next 48 hours: 160-180 mm hg, may need to give IVF to maintain it     2.  Elevated troponin  -patient has no chest pain  -repeat EKG with TWI  -on aspirin and statin  -follow troponin trend   -will follow cardiology consult    3. h/o DVT  -continue coumadin  -follow INR  -goal of INR: 2-3     4. GERD: on pantoprazole.     5. Hypokalemia: will replaced K    6. DVT Px: on coumadin with therapeutic INR

## 2018-02-20 NOTE — DISCHARGE NOTE ADULT - HOME CARE AGENCY
[facility] [phone].     For skilled nursing & physical therapy services. Requested start of care: 2/21/2018

## 2018-02-22 DIAGNOSIS — R69 ILLNESS, UNSPECIFIED: ICD-10-CM

## 2018-02-23 ENCOUNTER — EMERGENCY (EMERGENCY)
Facility: HOSPITAL | Age: 73
LOS: 0 days | Discharge: HOME | End: 2018-02-23
Attending: STUDENT IN AN ORGANIZED HEALTH CARE EDUCATION/TRAINING PROGRAM | Admitting: STUDENT IN AN ORGANIZED HEALTH CARE EDUCATION/TRAINING PROGRAM
Payer: MEDICARE

## 2018-02-23 ENCOUNTER — INPATIENT (INPATIENT)
Facility: HOSPITAL | Age: 73
LOS: 3 days | Discharge: NURSING, REHAB OR LONG-TERM CARE | DRG: 65 | End: 2018-02-27
Attending: PSYCHIATRY & NEUROLOGY | Admitting: PSYCHIATRY & NEUROLOGY
Payer: MEDICARE

## 2018-02-23 VITALS
OXYGEN SATURATION: 100 % | SYSTOLIC BLOOD PRESSURE: 149 MMHG | TEMPERATURE: 98 F | HEART RATE: 61 BPM | RESPIRATION RATE: 19 BRPM | DIASTOLIC BLOOD PRESSURE: 78 MMHG

## 2018-02-23 VITALS — DIASTOLIC BLOOD PRESSURE: 82 MMHG | RESPIRATION RATE: 19 BRPM | SYSTOLIC BLOOD PRESSURE: 158 MMHG | HEART RATE: 60 BPM

## 2018-02-23 VITALS
OXYGEN SATURATION: 100 % | WEIGHT: 179.9 LBS | TEMPERATURE: 98 F | HEIGHT: 67 IN | HEART RATE: 57 BPM | RESPIRATION RATE: 18 BRPM | SYSTOLIC BLOOD PRESSURE: 169 MMHG | DIASTOLIC BLOOD PRESSURE: 74 MMHG

## 2018-02-23 DIAGNOSIS — Z79.01 LONG TERM (CURRENT) USE OF ANTICOAGULANTS: ICD-10-CM

## 2018-02-23 DIAGNOSIS — I10 ESSENTIAL (PRIMARY) HYPERTENSION: ICD-10-CM

## 2018-02-23 DIAGNOSIS — R20.0 ANESTHESIA OF SKIN: ICD-10-CM

## 2018-02-23 DIAGNOSIS — Z90.710 ACQUIRED ABSENCE OF BOTH CERVIX AND UTERUS: Chronic | ICD-10-CM

## 2018-02-23 DIAGNOSIS — G40.909 EPILEPSY, UNSPECIFIED, NOT INTRACTABLE, WITHOUT STATUS EPILEPTICUS: ICD-10-CM

## 2018-02-23 DIAGNOSIS — Z86.718 PERSONAL HISTORY OF OTHER VENOUS THROMBOSIS AND EMBOLISM: ICD-10-CM

## 2018-02-23 PROBLEM — I82.409 ACUTE EMBOLISM AND THROMBOSIS OF UNSPECIFIED DEEP VEINS OF UNSPECIFIED LOWER EXTREMITY: Chronic | Status: ACTIVE | Noted: 2018-02-17

## 2018-02-23 LAB
ALBUMIN SERPL ELPH-MCNC: 3.4 G/DL — SIGNIFICANT CHANGE UP (ref 3.3–5)
ALBUMIN SERPL ELPH-MCNC: 4.2 G/DL — SIGNIFICANT CHANGE UP (ref 3.3–5)
ALP SERPL-CCNC: 105 U/L — SIGNIFICANT CHANGE UP (ref 40–120)
ALP SERPL-CCNC: 115 U/L — SIGNIFICANT CHANGE UP (ref 40–120)
ALT FLD-CCNC: 19 U/L RC — SIGNIFICANT CHANGE UP (ref 10–45)
ALT FLD-CCNC: 36 U/L — SIGNIFICANT CHANGE UP (ref 12–78)
ANION GAP SERPL CALC-SCNC: 13 MMOL/L — SIGNIFICANT CHANGE UP (ref 5–17)
ANION GAP SERPL CALC-SCNC: 7 MMOL/L — SIGNIFICANT CHANGE UP (ref 5–17)
ANISOCYTOSIS BLD QL: SLIGHT — SIGNIFICANT CHANGE UP
APPEARANCE UR: CLEAR — SIGNIFICANT CHANGE UP
APTT BLD: 35.1 SEC — SIGNIFICANT CHANGE UP (ref 27.5–37.4)
APTT BLD: 37 SEC — SIGNIFICANT CHANGE UP (ref 27.5–37.4)
AST SERPL-CCNC: 25 U/L — SIGNIFICANT CHANGE UP (ref 10–40)
AST SERPL-CCNC: 37 U/L — SIGNIFICANT CHANGE UP (ref 15–37)
BACTERIA # UR AUTO: (no result)
BASOPHILS # BLD AUTO: 0 K/UL — SIGNIFICANT CHANGE UP (ref 0–0.2)
BASOPHILS # BLD AUTO: 0.1 K/UL — SIGNIFICANT CHANGE UP (ref 0–0.2)
BASOPHILS NFR BLD AUTO: 0.7 % — SIGNIFICANT CHANGE UP (ref 0–2)
BASOPHILS NFR BLD AUTO: 0.7 % — SIGNIFICANT CHANGE UP (ref 0–2)
BILIRUB SERPL-MCNC: 0.5 MG/DL — SIGNIFICANT CHANGE UP (ref 0.2–1.2)
BILIRUB SERPL-MCNC: 0.5 MG/DL — SIGNIFICANT CHANGE UP (ref 0.2–1.2)
BILIRUB UR-MCNC: NEGATIVE — SIGNIFICANT CHANGE UP
BLD GP AB SCN SERPL QL: NEGATIVE — SIGNIFICANT CHANGE UP
BUN SERPL-MCNC: 13 MG/DL — SIGNIFICANT CHANGE UP (ref 7–23)
BUN SERPL-MCNC: 15 MG/DL — SIGNIFICANT CHANGE UP (ref 7–23)
CALCIUM SERPL-MCNC: 8.4 MG/DL — LOW (ref 8.5–10.1)
CALCIUM SERPL-MCNC: 9.5 MG/DL — SIGNIFICANT CHANGE UP (ref 8.4–10.5)
CHLORIDE SERPL-SCNC: 100 MMOL/L — SIGNIFICANT CHANGE UP (ref 96–108)
CHLORIDE SERPL-SCNC: 102 MMOL/L — SIGNIFICANT CHANGE UP (ref 96–108)
CK MB CFR SERPL CALC: 2.2 NG/ML — SIGNIFICANT CHANGE UP (ref 0–3.8)
CK SERPL-CCNC: 96 U/L — SIGNIFICANT CHANGE UP (ref 25–170)
CO2 SERPL-SCNC: 25 MMOL/L — SIGNIFICANT CHANGE UP (ref 22–31)
CO2 SERPL-SCNC: 27 MMOL/L — SIGNIFICANT CHANGE UP (ref 22–31)
COLOR SPEC: YELLOW — SIGNIFICANT CHANGE UP
CREAT SERPL-MCNC: 0.61 MG/DL — SIGNIFICANT CHANGE UP (ref 0.5–1.3)
CREAT SERPL-MCNC: 0.67 MG/DL — SIGNIFICANT CHANGE UP (ref 0.5–1.3)
DIFF PNL FLD: NEGATIVE — SIGNIFICANT CHANGE UP
ELLIPTOCYTES BLD QL SMEAR: SLIGHT — SIGNIFICANT CHANGE UP
EOSINOPHIL # BLD AUTO: 0.2 K/UL — SIGNIFICANT CHANGE UP (ref 0–0.5)
EOSINOPHIL # BLD AUTO: 0.2 K/UL — SIGNIFICANT CHANGE UP (ref 0–0.5)
EOSINOPHIL NFR BLD AUTO: 2.8 % — SIGNIFICANT CHANGE UP (ref 0–6)
EOSINOPHIL NFR BLD AUTO: 2.9 % — SIGNIFICANT CHANGE UP (ref 0–6)
EPI CELLS # UR: SIGNIFICANT CHANGE UP
GLUCOSE SERPL-MCNC: 100 MG/DL — HIGH (ref 70–99)
GLUCOSE SERPL-MCNC: 103 MG/DL — HIGH (ref 70–99)
GLUCOSE UR QL: NEGATIVE MG/DL — SIGNIFICANT CHANGE UP
HCT VFR BLD CALC: 41.7 % — SIGNIFICANT CHANGE UP (ref 34.5–45)
HCT VFR BLD CALC: 41.9 % — SIGNIFICANT CHANGE UP (ref 34.5–45)
HGB BLD-MCNC: 12.3 G/DL — SIGNIFICANT CHANGE UP (ref 11.5–15.5)
HGB BLD-MCNC: 13 G/DL — SIGNIFICANT CHANGE UP (ref 11.5–15.5)
INR BLD: 2.15 RATIO — HIGH (ref 0.88–1.16)
INR BLD: 2.16 RATIO — HIGH (ref 0.88–1.16)
KETONES UR-MCNC: NEGATIVE — SIGNIFICANT CHANGE UP
LEUKOCYTE ESTERASE UR-ACNC: (no result)
LYMPHOCYTES # BLD AUTO: 2.1 K/UL — SIGNIFICANT CHANGE UP (ref 1–3.3)
LYMPHOCYTES # BLD AUTO: 2.6 K/UL — SIGNIFICANT CHANGE UP (ref 1–3.3)
LYMPHOCYTES # BLD AUTO: 31.6 % — SIGNIFICANT CHANGE UP (ref 13–44)
LYMPHOCYTES # BLD AUTO: 33 % — SIGNIFICANT CHANGE UP (ref 13–44)
MCHC RBC-ENTMCNC: 19.1 PG — LOW (ref 27–34)
MCHC RBC-ENTMCNC: 20.6 PG — LOW (ref 27–34)
MCHC RBC-ENTMCNC: 29.3 GM/DL — LOW (ref 32–36)
MCHC RBC-ENTMCNC: 31.1 GM/DL — LOW (ref 32–36)
MCV RBC AUTO: 65.1 FL — LOW (ref 80–100)
MCV RBC AUTO: 66.2 FL — LOW (ref 80–100)
MICROCYTES BLD QL: SIGNIFICANT CHANGE UP
MONOCYTES # BLD AUTO: 0.6 K/UL — SIGNIFICANT CHANGE UP (ref 0–0.9)
MONOCYTES # BLD AUTO: 0.8 K/UL — SIGNIFICANT CHANGE UP (ref 0–0.9)
MONOCYTES NFR BLD AUTO: 10.1 % — SIGNIFICANT CHANGE UP (ref 2–14)
MONOCYTES NFR BLD AUTO: 8.4 % — SIGNIFICANT CHANGE UP (ref 2–14)
NEUTROPHILS # BLD AUTO: 3.8 K/UL — SIGNIFICANT CHANGE UP (ref 1.8–7.4)
NEUTROPHILS # BLD AUTO: 4.2 K/UL — SIGNIFICANT CHANGE UP (ref 1.8–7.4)
NEUTROPHILS NFR BLD AUTO: 53.4 % — SIGNIFICANT CHANGE UP (ref 43–77)
NEUTROPHILS NFR BLD AUTO: 56.4 % — SIGNIFICANT CHANGE UP (ref 43–77)
NITRITE UR-MCNC: NEGATIVE — SIGNIFICANT CHANGE UP
PH UR: 5 — SIGNIFICANT CHANGE UP (ref 5–8)
PLAT MORPH BLD: NORMAL — SIGNIFICANT CHANGE UP
PLATELET # BLD AUTO: 212 K/UL — SIGNIFICANT CHANGE UP (ref 150–400)
PLATELET # BLD AUTO: 226 K/UL — SIGNIFICANT CHANGE UP (ref 150–400)
POIKILOCYTOSIS BLD QL AUTO: SLIGHT — SIGNIFICANT CHANGE UP
POTASSIUM SERPL-MCNC: 3.8 MMOL/L — SIGNIFICANT CHANGE UP (ref 3.5–5.3)
POTASSIUM SERPL-MCNC: 5 MMOL/L — SIGNIFICANT CHANGE UP (ref 3.5–5.3)
POTASSIUM SERPL-SCNC: 3.8 MMOL/L — SIGNIFICANT CHANGE UP (ref 3.5–5.3)
POTASSIUM SERPL-SCNC: 5 MMOL/L — SIGNIFICANT CHANGE UP (ref 3.5–5.3)
PROT SERPL-MCNC: 7.5 GM/DL — SIGNIFICANT CHANGE UP (ref 6–8.3)
PROT SERPL-MCNC: 8.1 G/DL — SIGNIFICANT CHANGE UP (ref 6–8.3)
PROT UR-MCNC: NEGATIVE MG/DL — SIGNIFICANT CHANGE UP
PROTHROM AB SERPL-ACNC: 23.6 SEC — HIGH (ref 9.8–12.7)
PROTHROM AB SERPL-ACNC: 23.7 SEC — HIGH (ref 9.8–12.7)
RBC # BLD: 6.3 M/UL — HIGH (ref 3.8–5.2)
RBC # BLD: 6.43 M/UL — HIGH (ref 3.8–5.2)
RBC # FLD: 13.2 % — SIGNIFICANT CHANGE UP (ref 10.3–14.5)
RBC # FLD: 13.4 % — SIGNIFICANT CHANGE UP (ref 10.3–14.5)
RBC BLD AUTO: ABNORMAL
RBC CASTS # UR COMP ASSIST: SIGNIFICANT CHANGE UP /HPF (ref 0–4)
RH IG SCN BLD-IMP: POSITIVE — SIGNIFICANT CHANGE UP
SODIUM SERPL-SCNC: 134 MMOL/L — LOW (ref 135–145)
SODIUM SERPL-SCNC: 140 MMOL/L — SIGNIFICANT CHANGE UP (ref 135–145)
SP GR SPEC: 1.01 — SIGNIFICANT CHANGE UP (ref 1.01–1.02)
TROPONIN T SERPL-MCNC: <0.01 NG/ML — SIGNIFICANT CHANGE UP (ref 0–0.06)
UROBILINOGEN FLD QL: NEGATIVE MG/DL — SIGNIFICANT CHANGE UP
WBC # BLD: 6.7 K/UL — SIGNIFICANT CHANGE UP (ref 3.8–10.5)
WBC # BLD: 7.9 K/UL — SIGNIFICANT CHANGE UP (ref 3.8–10.5)
WBC # FLD AUTO: 6.7 K/UL — SIGNIFICANT CHANGE UP (ref 3.8–10.5)
WBC # FLD AUTO: 7.9 K/UL — SIGNIFICANT CHANGE UP (ref 3.8–10.5)
WBC UR QL: SIGNIFICANT CHANGE UP

## 2018-02-23 PROCEDURE — 93010 ELECTROCARDIOGRAM REPORT: CPT

## 2018-02-23 PROCEDURE — 70450 CT HEAD/BRAIN W/O DYE: CPT | Mod: 26

## 2018-02-23 PROCEDURE — 71045 X-RAY EXAM CHEST 1 VIEW: CPT | Mod: 26

## 2018-02-23 PROCEDURE — 71045 X-RAY EXAM CHEST 1 VIEW: CPT | Mod: 26,77

## 2018-02-23 PROCEDURE — 70450 CT HEAD/BRAIN W/O DYE: CPT | Mod: 26,77

## 2018-02-23 PROCEDURE — 99222 1ST HOSP IP/OBS MODERATE 55: CPT | Mod: GC

## 2018-02-23 PROCEDURE — 99285 EMERGENCY DEPT VISIT HI MDM: CPT

## 2018-02-23 RX ORDER — LEVETIRACETAM 250 MG/1
500 TABLET, FILM COATED ORAL ONCE
Qty: 0 | Refills: 0 | Status: COMPLETED | OUTPATIENT
Start: 2018-02-23 | End: 2018-02-23

## 2018-02-23 RX ORDER — WARFARIN SODIUM 2.5 MG/1
2 TABLET ORAL ONCE
Qty: 0 | Refills: 0 | Status: DISCONTINUED | OUTPATIENT
Start: 2018-02-23 | End: 2018-02-23

## 2018-02-23 RX ORDER — LEVETIRACETAM 250 MG/1
1 TABLET, FILM COATED ORAL
Qty: 60 | Refills: 0 | OUTPATIENT
Start: 2018-02-23 | End: 2018-03-24

## 2018-02-23 RX ORDER — ACETAMINOPHEN 500 MG
650 TABLET ORAL EVERY 6 HOURS
Qty: 0 | Refills: 0 | Status: DISCONTINUED | OUTPATIENT
Start: 2018-02-23 | End: 2018-02-27

## 2018-02-23 RX ORDER — PANTOPRAZOLE SODIUM 20 MG/1
40 TABLET, DELAYED RELEASE ORAL
Qty: 0 | Refills: 0 | Status: DISCONTINUED | OUTPATIENT
Start: 2018-02-23 | End: 2018-02-27

## 2018-02-23 RX ORDER — ATORVASTATIN CALCIUM 80 MG/1
80 TABLET, FILM COATED ORAL AT BEDTIME
Qty: 0 | Refills: 0 | Status: DISCONTINUED | OUTPATIENT
Start: 2018-02-23 | End: 2018-02-26

## 2018-02-23 RX ORDER — HYDROCHLOROTHIAZIDE 25 MG
12.5 TABLET ORAL DAILY
Qty: 0 | Refills: 0 | Status: DISCONTINUED | OUTPATIENT
Start: 2018-02-23 | End: 2018-02-26

## 2018-02-23 RX ORDER — LEVETIRACETAM 250 MG/1
250 TABLET, FILM COATED ORAL
Qty: 0 | Refills: 0 | Status: DISCONTINUED | OUTPATIENT
Start: 2018-02-23 | End: 2018-02-23

## 2018-02-23 RX ORDER — AMLODIPINE BESYLATE 2.5 MG/1
5 TABLET ORAL DAILY
Qty: 0 | Refills: 0 | Status: DISCONTINUED | OUTPATIENT
Start: 2018-02-23 | End: 2018-02-27

## 2018-02-23 RX ORDER — LEVETIRACETAM 250 MG/1
250 TABLET, FILM COATED ORAL
Qty: 0 | Refills: 0 | Status: DISCONTINUED | OUTPATIENT
Start: 2018-02-23 | End: 2018-02-26

## 2018-02-23 RX ORDER — WARFARIN SODIUM 2.5 MG/1
2 TABLET ORAL ONCE
Qty: 0 | Refills: 0 | Status: COMPLETED | OUTPATIENT
Start: 2018-02-23 | End: 2018-02-23

## 2018-02-23 RX ADMIN — WARFARIN SODIUM 2 MILLIGRAM(S): 2.5 TABLET ORAL at 22:26

## 2018-02-23 RX ADMIN — LEVETIRACETAM 250 MILLIGRAM(S): 250 TABLET, FILM COATED ORAL at 22:26

## 2018-02-23 RX ADMIN — ATORVASTATIN CALCIUM 80 MILLIGRAM(S): 80 TABLET, FILM COATED ORAL at 22:26

## 2018-02-23 RX ADMIN — LEVETIRACETAM 400 MILLIGRAM(S): 250 TABLET, FILM COATED ORAL at 13:41

## 2018-02-23 NOTE — H&P ADULT - MENTAL STATUS
AAOx3, non-fluent speech, follows commands, able to name and repeat, hesitancy when speaking, delayed verbal output

## 2018-02-23 NOTE — CONSULT NOTE ADULT - SUBJECTIVE AND OBJECTIVE BOX
Neurology Consult    Name  PHILIPPE ÁLVARZE    HPI: 74 yearold Hungarian speaking woman with PMH of DVT on Coumadin, HTN presents with right sided weakness and difficulty getting words out. As per son at bedside, she had a similar episode on Saturday and was taken to [facility] where they did a stroke work up with MRI, echo, and CTA, all of which came up negative and symptoms resolved within 2 hours. Last night she had another similar episode where she could not move her right side as well and went to [place] which got better by 60%. She was observed and sent home. This morning they came to [facility] because she had rt sided weakness and difficulty expressing herself which she felt was more severe. She states that she knows what she wants to say but has a hard time getting the words out. Patient reports she has a feeling of epigastric rising sensation before these episodes happen and sometimes gets numbness on both hands when the symptoms begin. She also had an episode of atonia with one of the episodes where she just collapsed. Denies any headache, N/V. No tongue biting or urinary incontinence reported.  NIHSS-5, MRS-0    MEDICATIONS  (STANDING):    MEDICATIONS  (PRN):      Allergies    penicillin (Rash)    Intolerances        Objective:   Vital Signs Last 24 Hrs  T(C): 36.8 (23 Feb 2018 13:45), Max: 36.8 (23 Feb 2018 00:20)  T(F): 98.2 (23 Feb 2018 13:45), Max: 98.2 (23 Feb 2018 00:20)  HR: 60 (23 Feb 2018 13:45) (57 - 70)  BP: 127/73 (23 Feb 2018 13:45) (127/73 - 169/74)  BP(mean): --  RR: 18 (23 Feb 2018 13:45) (18 - 20)  SpO2: 98% (23 Feb 2018 13:45) (97% - 100%)    General Exam:   General appearance: No acute distress                   Neurological Exam:  Mental Status: AAOx3, non-fluent speech, follows commands, able to name and repeat, hesitancy when speaking, delayed verbal output    Cranial Nerves: EOMI, PERRL, V1-V3 intact, facial symmetry intact, no dysarthria, tongue midline    Motor: 3/5 RUE, 1/5 RLE, 5/5 LUE and LLE    Sensation: Intact to LT throughout    Coordination: FTN intact on left, unable to do on right due to weakness    downgoing toes b/l      Labs:    02-23    140  |  100  |  13  ----------------------------<  103<H>  3.8   |  27  |  0.67    Ca    9.5      23 Feb 2018 11:26    TPro  8.1  /  Alb  4.2  /  TBili  0.5  /  DBili  x   /  AST  25  /  ALT  19  /  AlkPhos  105  02-23    LIVER FUNCTIONS - ( 23 Feb 2018 11:26 )  Alb: 4.2 g/dL / Pro: 8.1 g/dL / ALK PHOS: 105 U/L / ALT: 19 U/L RC / AST: 25 U/L / GGT: x           CBC Full  -  ( 23 Feb 2018 11:26 )  WBC Count : 6.7 K/uL  Hemoglobin : 13.0 g/dL  Hematocrit : 41.7 %  Platelet Count - Automated : 226 K/uL  Mean Cell Volume : 66.2 fl  Mean Cell Hemoglobin : 20.6 pg  Mean Cell Hemoglobin Concentration : 31.1 gm/dL  Auto Neutrophil # : 3.8 K/uL  Auto Lymphocyte # : 2.1 K/uL  Auto Monocyte # : 0.6 K/uL  Auto Eosinophil # : 0.2 K/uL  Auto Basophil # : 0.0 K/uL  Auto Neutrophil % : 56.4 %  Auto Lymphocyte % : 31.6 %  Auto Monocyte % : 8.4 %  Auto Eosinophil % : 2.9 %  Auto Basophil % : 0.7 %      Radiology

## 2018-02-23 NOTE — ED ADULT NURSE NOTE - CHIEF COMPLAINT QUOTE
pt was seen at [facility] yesterday with bilateral arm weakness and numbness. pt had ct of head that was negative- pt states that she went home with no symptoms- pt states that symptoms of numbness and difficulty forming words started at 6am.

## 2018-02-23 NOTE — CONSULT NOTE ADULT - ASSESSMENT
74 yearold Italian speaking woman with PMH of DVT on Coumadin, HTN presents with right sided weakness and difficulty getting words out. Had similar episode last Saturday and went to [facility] where she had MRI Brain, CTA H/N and echo which were all negative. She then had another episode last night with rt hemiparesis and went to [place] and CT was negative and she was sent home. This morning she presented with paucity in speech/ difficulty getting words out and right hemiparesis and was brought to [facility]. Patient reports that she gets an epigastric sensation preceding these episodes which is concerning for seizure and since its the same symptoms on every episode and stroke work up at [place] was negative, low suspicion for stroke.     Unable to admit patient for EEG because all of the machines are in use and there are no open EMU beds.    Plan:  F/u with Neurology outpatient for ambulatory EEG- Appointment made for 2/28 at 2:30 pm at [address] [place] [address] with Dr. Painting. [phone]  Start Keppra 250 BID  Case and plan discussed with Dr. Knott. 74 yearold Lithuanian speaking woman with PMH of DVT on Coumadin, HTN presents with right sided weakness and difficulty getting words out. Had similar episode last Saturday and went to [facility] where she had MRI Brain, CTA H/N and echo which were all negative. She then had another episode last night with rt hemiparesis and went to [place] and CT was negative and she was sent home. This morning she presented with paucity in speech/ difficulty getting words out and right hemiparesis and was brought to [facility]. Patient reports that she gets an epigastric sensation preceding these episodes which is concerning for seizure and since its the same symptoms on every episode and stroke work up at [place] was negative, low suspicion for stroke.     Unable to admit patient for EEG because all of the machines are in use and there are no open EMU beds.  Patient's family aware and would like to continue workup as outpatient instead of waiting for opening.    Plan:  F/u with Neurology outpatient for ambulatory EEG- Appointment made for 2/28 at 2:30 pm at [address] [place] [address] with Dr. Painting. [phone]  Start Keppra 250 BID  Side effects discussed, no driving.  Case and plan discussed with Dr. Knott.

## 2018-02-23 NOTE — ED PROVIDER NOTE - PROGRESS NOTE DETAILS
Symptoms resolved, just like prior episodes this week.  Neurology consult believes this is much less likely ischemic and more likely atypical Sz, and are recommending that the patient start Keppra 250mg, bid, until she can undergo EEG to confirm the diagnosis.  However, we have no ability to perform EEG on this patient at this time because all the machines are currently in use ([facility]) and will be in use until Monday, at the earliest, per Neuro attending, Dr. Derick Knott.  I have discussed the following options with the patient and her : a) d/c home on keppra 250mg BID, strict return precautions, and EEG as outpatient with Dr. Painting, in 5days, 2/28, 2pm at [address] [place] [address]; or b) admit to Neurology for at least 3d without EEG.  The family has opted for option A, and understand all the indications to return to the ED.

## 2018-02-23 NOTE — H&P ADULT - ASSESSMENT
74 yearold Polish speaking woman with PMH of DVT on Coumadin, HTN presents with right sided weakness and difficulty getting words out. Had similar episode last Saturday and went to [facility] where she had MRI Brain, CTA H/N and echo which were all negative. She then had another episode last night with rt hemiparesis and went to [place] and CT was negative and she was sent home. This morning she presented with paucity in speech/ difficulty getting words out and right hemiparesis and was brought to [facility]. Patient reports that she gets an epigastric sensation preceding these episodes which is concerning for seizure and since its the same symptoms on every episode and stroke work up at [place] was negative, low suspicion for stroke.       Plan:  Start Keppra 250 BID  vEEG when available  outpatient appointment made for 2/28 at 2:30 pm at [address] [place] [address] with Dr. Painting. [phone]  Side effects discussed, no driving.    Case and plan discussed with Dr. Knott and Dr. Palacios 74 yearold Mohawk speaking woman with PMH of DVT on Coumadin, HTN presents with right sided weakness and difficulty getting words out. Had similar episode last Saturday and went to [facility] where she had MRI Brain, CTA H/N and echo which were all negative. She then had another episode last night with rt hemiparesis and went to [place] and CT was negative and she was sent home. This morning she presented with paucity in speech/ difficulty getting words out and right hemiparesis and was brought to [facility]. Patient reports that she gets an epigastric sensation preceding these episodes which is concerning for seizure and since its the same symptoms on every episode and stroke work up at [place] was negative, low suspicion for stroke.       Plan:  Start Keppra 250 BID  vEEG when available  c/w coumadin 2mg daily  dysphagia screen  outpatient appointment made for 2/28 at 2:30 pm at [address] [place] [address] with Dr. Painting. [phone]  Side effects discussed, no driving.    Case and plan discussed with Dr. Knott and Dr. Palacios

## 2018-02-23 NOTE — ED PROVIDER NOTE - NS_ ATTENDINGSCRIBEDETAILS _ED_A_ED_FT
MD Kimble:  The scribe's documentation has been prepared under my direction and personally reviewed by me in its entirety. I confirm that the note above accurately reflects all work, treatment, procedures, and medical decision making performed by me.  MD Kimble:  see the MDM & progress notes for my I&P.

## 2018-02-23 NOTE — H&P ADULT - HISTORY OF PRESENT ILLNESS
72 yearold Malay speaking woman with PMH of DVT on Coumadin, HTN presents with right sided weakness and difficulty getting words out. As per son at bedside, she had a similar episode on Saturday and was taken to [facility] where they did a stroke work up with MRI, echo, and CTA, all of which came up negative and symptoms resolved within 2 hours. Last night she had another similar episode where she could not move her right side as well and went to [place] which got better by 60%. She was observed and sent home. This morning they came to [facility] because she had rt sided weakness and difficulty expressing herself which she felt was more severe. She states that she knows what she wants to say but has a hard time getting the words out. Patient reports she has a feeling of epigastric rising sensation before these episodes happen and sometimes gets numbness on both hands when the symptoms begin. She also had an episode of atonia with one of the episodes where she just collapsed. Denies any headache, N/V. No tongue biting or urinary incontinence reported.  NIHSS-5, MRS-0

## 2018-02-23 NOTE — ED PROVIDER NOTE - MEDICAL DECISION MAKING DETAILS
72 yo female with right sided numbness- admitted to hospital for the same; will repeat CT scan head; labs; EKG/CXR; UA; re-eval

## 2018-02-23 NOTE — ED ADULT TRIAGE NOTE - CHIEF COMPLAINT QUOTE
pt was seen at [facility] yesterday with bilateral arm weakness and numbness. pt had ct of head that was negative- pt states that she went home with no symptoms- pt states that symptoms of numbness and difficulty forming words started at 6am.

## 2018-02-23 NOTE — ED ADULT TRIAGE NOTE - CHIEF COMPLAINT QUOTE
right sided arm and leg weakness started this morning. Pt was d/cd from  for TIA, HTN and DVT 3 days ago.

## 2018-02-23 NOTE — ED ADULT NURSE REASSESSMENT NOTE - NS ED NURSE REASSESS COMMENT FT1
neuro recommends EEG, however, there are no EEG machines available in the hospital for 3 days.  MD Kimble is on phone with pt's son in law who is an MD at the request of the pt's son at the bedside. dispo pending.

## 2018-02-23 NOTE — ED ADULT NURSE REASSESSMENT NOTE - NS ED NURSE REASSESS COMMENT FT1
pt assisted onto bedpan, speech more clear and less difficulty word finding, but pt does not report feeling any better, increased strength to R leg and R arm, able to lift R leg off of bed

## 2018-02-23 NOTE — ED PROVIDER NOTE - PROGRESS NOTE DETAILS
Ibrahima SUAREZ: Pending labs, imaging at this time. Ibrahima DO: CXR and EKG unchanged from 2/18/18; INR therapeutic- pending CT head result at this time. Ibrahima DO: CT scan head negative for ICH; patient ambulatory in ED with steady gait; will d/c home at this time; instructed to f/u with PMD in 1 day without fail; patient has not yet made an appt for f/u with neurologist- once again encouraged to f/u in 1-2 days without fail; strict return precautions given.

## 2018-02-23 NOTE — ED ADULT NURSE NOTE - OBJECTIVE STATEMENT
Pt d/c from [facility] (seen there for same symptoms with full workup) this morning, returned home at 0430, and  reports that pt had acute aphasia and right sided weakness. Pt d/c from [facility] (seen there for same symptoms with full workup) this morning, returned home at 0430, and  reports that at 0630 pt had acute onset aphasia and right sided weakness.  He reports that although she was able to walk up/down stairs at [place] prior to discharge, she was never fully back to her baseline before leaving the hospital.  Pt with need for heavy assistance to get from wheelchair to stretcher, limited english but does not sound slurred, +word finding difficulty.  +R sided upper and lower extremity weakness.  Reports that upon arriving to ER had an episode of "heart heaviness and nausea" which has now resolved.  At this time pt denies cp, sob, headache, n/v, abd pain, numbness/tingling, chills.

## 2018-02-23 NOTE — H&P ADULT - NSHPLABSRESULTS_GEN_ALL_CORE
Unremarkable noncontrast CT of the brain.No hemorrhage. No   change since 1:25 AM. MRI brain 2/18- There is no cortical edema, mass effect or hydrocephalus. Cortical volume   and white matter signal are preserved. There is no evidence of acute   infarct or previous parenchymal hemorrhage. The orbital and sellar   contents and cerebellar tonsils are within normal limits    IMPRESSION:  Normal brain        CTH: 2/23- Unremarkable noncontrast CT of the brain.No hemorrhage. No   change since 1:25 AM.

## 2018-02-23 NOTE — CONSULT NOTE ADULT - ATTENDING COMMENTS
Case discussed with me but patient not personally seen by myself.    Concern for seizure activity and agree with low dose LEV.  Would like to obtain EEG testing and will continue workup as outpatient.    Patient and family were explained the risks of seizure and know not to drive.

## 2018-02-23 NOTE — ED PROVIDER NOTE - OBJECTIVE STATEMENT
Pt is a 72 y/o female w/ a c/o right sided weakness and slow speech (Code stroke activated) for x 4 hrs. Context: Pt  describes a x1 week hx of stuttering, stroke like symptoms for which she got worked up x2 this past week at [facility]. First time x6 days ago after presenting with the same symptoms. She was admitted with an aggressive workup w/ CT, MRI, telemetry ,and ECHO w/o etiology. Pt was discharged and experienced recurrent symptoms last night ad went back to [facility] and got another CT, which turned out negative. Pt was observed and sent home. Pt's symptoms (similar to what happened x6 days ago and last night) recurred this morning at 7 AM, which was slow speech and RUE and RLE of weakness. Pt showed no facial drooping, no headache or chest pain. There are no clear modifiers. Pt is on Warfarin/Coumadin for prior DVT

## 2018-02-23 NOTE — ED ADULT NURSE NOTE - OBJECTIVE STATEMENT
complaining of numbness to right side and lightheadedness. recently seen at  ED for TIA with symptoms of numbness to right side and "not speaking".

## 2018-02-23 NOTE — ED ADULT NURSE REASSESSMENT NOTE - NS ED NURSE REASSESS COMMENT FT1
pt and family agreeable to d/c, attempted to walk pt but once she is in standing position she refuses to take steps stating "my foot has no energy" and she is "too sleepy to walk,"  MD Kimble made aware and pt now to be admitted in the context of inability to ambulate

## 2018-02-23 NOTE — H&P ADULT - ATTENDING COMMENTS
Patient reportedly having recurrent attacks of right sided weakness preceded by epigastrict sensation.  Stroke work up last week was negative, suspect possible focal seizures with post-ictal Todds paresis.  Started on Keppra.  Continues to have right leg pain and weakness, unable to stand.  Consult PT/OT.      Lalo Palacios MD  EEG / Epilepsy Attending Physician

## 2018-02-23 NOTE — ED PROVIDER NOTE - MEDICAL DECISION MAKING DETAILS
74 y/o adult female w/ right sided weakness and possible stroke w/ code stroke activated Neurology recommending CTA and CT perfusion. Differential inquiries stoke seizure disposition per neurology recommendations. 74 y/o adult female w/ right sided weakness and possible stroke w/ code stroke activated Neurology recommending CTA and CT perfusion. Differential inquiries stoke vs seizure disposition per neurology recommendations.

## 2018-02-24 LAB
APTT BLD: 36 SEC — SIGNIFICANT CHANGE UP (ref 27.5–37.4)
INR BLD: 2.31 RATIO — HIGH (ref 0.88–1.16)
PROTHROM AB SERPL-ACNC: 26.5 SEC — HIGH (ref 10–13.1)

## 2018-02-24 PROCEDURE — 99232 SBSQ HOSP IP/OBS MODERATE 35: CPT | Mod: GC

## 2018-02-24 RX ORDER — WARFARIN SODIUM 2.5 MG/1
2 TABLET ORAL ONCE
Qty: 0 | Refills: 0 | Status: COMPLETED | OUTPATIENT
Start: 2018-02-24 | End: 2018-02-24

## 2018-02-24 RX ADMIN — PANTOPRAZOLE SODIUM 40 MILLIGRAM(S): 20 TABLET, DELAYED RELEASE ORAL at 05:27

## 2018-02-24 RX ADMIN — LEVETIRACETAM 250 MILLIGRAM(S): 250 TABLET, FILM COATED ORAL at 17:21

## 2018-02-24 RX ADMIN — AMLODIPINE BESYLATE 5 MILLIGRAM(S): 2.5 TABLET ORAL at 05:27

## 2018-02-24 RX ADMIN — Medication 10 MILLIGRAM(S): at 05:27

## 2018-02-24 RX ADMIN — ATORVASTATIN CALCIUM 80 MILLIGRAM(S): 80 TABLET, FILM COATED ORAL at 22:06

## 2018-02-24 RX ADMIN — LEVETIRACETAM 250 MILLIGRAM(S): 250 TABLET, FILM COATED ORAL at 05:27

## 2018-02-24 RX ADMIN — WARFARIN SODIUM 2 MILLIGRAM(S): 2.5 TABLET ORAL at 22:04

## 2018-02-24 RX ADMIN — Medication 12.5 MILLIGRAM(S): at 06:07

## 2018-02-24 NOTE — PHYSICAL THERAPY INITIAL EVALUATION ADULT - PERTINENT HX OF CURRENT PROBLEM, REHAB EVAL
Pt is a 73 year old female admitted to [facility] on 2/23/18 for right hemiparesis pt is Ukrainian speaking with PMH of DVT on Coumadin, HTN presents with right sided weakness and difficulty getting words out.

## 2018-02-24 NOTE — PHYSICAL THERAPY INITIAL EVALUATION ADULT - PRECAUTIONS/LIMITATIONS, REHAB EVAL
As per son  she had a similar episode on Saturday and was taken to [facility] where they did a stroke work up with MRI, echo, and CTA, all of which came up negative and symptoms resolved within 2 hours. Last night she had another similar episode where she could not move her right side as well and went to [place] which got better by 60%. She was observed and sent home. This morning they came to [facility] because she had rt sided weakness and difficulty expressing herself which she felt was more severe. She states that she knows what she wants to say but has a hard time getting the words out. Patient reports she has a feeling of epigastric rising sensation before these episodes happen and sometimes gets numbness on both hands when the symptoms begin. She also had an episode of atonia with one of the episodes where she just collapsed. Denies any headache, N/V. No tongue biting or urinary incontinence reported. NIHSS-5, MRS-0 see below in additional comments As per son she had a similar episode on Saturday and was taken to [facility] where they did a stroke work up with MRI, echo, and CTA, all of which came up negative and symptoms resolved within 2 hours. Last night she had another similar episode where she could not move her right side as well and went to [place] which got better by 60%. She was observed and sent home. This morning they came to [facility] because she had rt sided weakness and difficulty expressing herself which she felt was more severe. She states that she knows what she wants to say but has a hard time getting the words out. Patient reports she has a feeling of epigastric rising sensation before these episodes happen and sometimes gets numbness on both hands when the symptoms begin. She also had an episode of atonia with one of the episodes where she just collapsed. Denies any headache, N/V. No tongue biting or urinary incontinence reported. NIHSS-5, MRS-0 see below in additional comments As per son she had a similar episode on Saturday and was taken to [facility] where they did a stroke work up with MRI, echo, and CTA, all of which came up negative and symptoms resolved within 2 hours. Last night she had another similar episode where she could not move her right side as well and went to [place] which got better by 60%. She was observed and sent home. This morning they came to [facility] because she had rt sided weakness and difficulty expressing herself which she felt was more severe. She states that she knows what she wants to say but has a hard time getting the words out. Patient reports she has a feeling of epigastric rising sensation before these episodes happen and sometimes gets numbness on both hands when the symptoms begin. She also had an episode of atonia with one of the episodes where she just collapsed. Denies any headache, N/V. No tongue biting or urinary incontinence reported. NIHSS-5, MRS-0 see below in additional comments/fall precautions

## 2018-02-24 NOTE — PROGRESS NOTE ADULT - SUBJECTIVE AND OBJECTIVE BOX
Neurology Progress Note    S: Pt c/o RLE pain overnight. Her strength is significantly improved but not fully back to her baseline yet. No further events overnight.     MEDICATIONS  (STANDING):  amLODIPine   Tablet 5 milliGRAM(s) Oral daily  atorvastatin 80 milliGRAM(s) Oral at bedtime  enalapril 10 milliGRAM(s) Oral daily  hydrochlorothiazide 12.5 milliGRAM(s) Oral daily  levETIRAcetam 250 milliGRAM(s) Oral two times a day  pantoprazole    Tablet 40 milliGRAM(s) Oral before breakfast    MEDICATIONS  (PRN):  acetaminophen   Tablet. 650 milliGRAM(s) Oral every 6 hours PRN Mild Pain (1 - 3)  LORazepam   Injectable 2 milliGRAM(s) IV Push once PRN Seizure activity    Objective:   Vital Signs Last 24 Hrs  T(C): 36.4 (24 Feb 2018 08:19), Max: 37.2 (23 Feb 2018 21:27)  T(F): 97.5 (24 Feb 2018 08:19), Max: 98.9 (23 Feb 2018 21:27)  HR: 60 (24 Feb 2018 08:19) (60 - 70)  BP: 110/65 (24 Feb 2018 08:19) (110/65 - 166/81)  BP(mean): --  RR: 20 (24 Feb 2018 08:19) (16 - 20)  SpO2: 95% (24 Feb 2018 08:19) (95% - 98%)                 General Adult Exam  GENERAL APPEARANCE: Well developed, well nourished, alert and cooperative, and appears to be in no acute distress.    Neurological Exam:  Mental Status: Awake and alert, follows commands, speech fluent    Cranial Nerves: PERRL, EOMI, VFF, no facial asymmetry.  Tongue, uvula and palate midline.  Sternocleidomastoid and Trapezius intact bilaterally.    Motor:   Tone: normal.                  Strength:     [] Upper extremity                            Delt       Bicep    Tricep                                                  R         5/5        5/5        5/5       5/5                                               L          5/5        5/5        5/5       5/5  [] Lower extremity                             HF          KE          KF        DF         PF                                               R        5/5        5/5        5/5       5/5       5/5                                               L         5/5        5/5       5/5       4/5        4/5  Pronator drift: none                 Tremor: No resting, postural or action tremor.  No myoclonus.    Sensation: intact to light touch x 4 extremities      Other:                        13.0   6.7   )-----------( 226      ( 23 Feb 2018 11:26 )             41.7     02-23    140  |  100  |  13  ----------------------------<  103<H>  3.8   |  27  |  0.67    Ca    9.5      23 Feb 2018 11:26    TPro  8.1  /  Alb  4.2  /  TBili  0.5  /  DBili  x   /  AST  25  /  ALT  19  /  AlkPhos  105  02-23    LIVER FUNCTIONS - ( 23 Feb 2018 11:26 )  Alb: 4.2 g/dL / Pro: 8.1 g/dL / ALK PHOS: 105 U/L / ALT: 19 U/L RC / AST: 25 U/L / GGT: x           PT/INR - ( 23 Feb 2018 11:27 )   PT: 23.7 sec;   INR: 2.16 ratio         PTT - ( 23 Feb 2018 11:27 )  PTT:37.0 sec Neurology Progress Note    S: Pt c/o RLE pain since yesterday, radiating from the top of her leg laterally down to her foot. Her strength is significantly improved but not fully back to her baseline yet. No further events overnight.     MEDICATIONS  (STANDING):  amLODIPine   Tablet 5 milliGRAM(s) Oral daily  atorvastatin 80 milliGRAM(s) Oral at bedtime  enalapril 10 milliGRAM(s) Oral daily  hydrochlorothiazide 12.5 milliGRAM(s) Oral daily  levETIRAcetam 250 milliGRAM(s) Oral two times a day  pantoprazole    Tablet 40 milliGRAM(s) Oral before breakfast    MEDICATIONS  (PRN):  acetaminophen   Tablet. 650 milliGRAM(s) Oral every 6 hours PRN Mild Pain (1 - 3)  LORazepam   Injectable 2 milliGRAM(s) IV Push once PRN Seizure activity    Objective:   Vital Signs Last 24 Hrs  T(C): 36.4 (24 Feb 2018 08:19), Max: 37.2 (23 Feb 2018 21:27)  T(F): 97.5 (24 Feb 2018 08:19), Max: 98.9 (23 Feb 2018 21:27)  HR: 60 (24 Feb 2018 08:19) (60 - 70)  BP: 110/65 (24 Feb 2018 08:19) (110/65 - 166/81)  BP(mean): --  RR: 20 (24 Feb 2018 08:19) (16 - 20)  SpO2: 95% (24 Feb 2018 08:19) (95% - 98%)                 General Adult Exam  GENERAL APPEARANCE: Well developed, well nourished, alert and cooperative, and appears to be in no acute distress.    Neurological Exam:  Mental Status: Awake and alert, follows commands, speech fluent    Cranial Nerves: PERRL, EOMI, VFF, no facial asymmetry.  Tongue, uvula and palate midline.  Sternocleidomastoid and Trapezius intact bilaterally.    Motor:   Tone: normal.                  Strength:     [] Upper extremity                            Delt       Bicep    Tricep                                                  R         5/5        5/5        5/5       5/5                                               L          5/5        5/5        5/5       5/5  [] Lower extremity                             HF          KE          KF        DF         PF                                               R        4/5        5/5        5/5       5/5       5/5                                               L         5/5        5/5       5/5       5/5        5/5  Pronator drift: + RUE               Tremor: No resting, postural or action tremor.  No myoclonus.    Sensation: intact to light touch x 4 extremities      Other:                        13.0   6.7   )-----------( 226      ( 23 Feb 2018 11:26 )             41.7     02-23    140  |  100  |  13  ----------------------------<  103<H>  3.8   |  27  |  0.67    Ca    9.5      23 Feb 2018 11:26    TPro  8.1  /  Alb  4.2  /  TBili  0.5  /  DBili  x   /  AST  25  /  ALT  19  /  AlkPhos  105  02-23    LIVER FUNCTIONS - ( 23 Feb 2018 11:26 )  Alb: 4.2 g/dL / Pro: 8.1 g/dL / ALK PHOS: 105 U/L / ALT: 19 U/L RC / AST: 25 U/L / GGT: x           PT/INR - ( 23 Feb 2018 11:27 )   PT: 23.7 sec;   INR: 2.16 ratio         PTT - ( 23 Feb 2018 11:27 )  PTT:37.0 sec

## 2018-02-24 NOTE — PROGRESS NOTE ADULT - ASSESSMENT
73 year old woman with PMH of DVT on Coumadin, HTN presents with right sided weakness and difficulty getting words out. Had similar episode last Saturday and went to [facility] where she had MRI Brain, CTA H/N and echo which were all negative. She then had another episode last night with rt hemiparesis and went to [place] and CT was negative and she was sent home. On day of admission, she presented with paucity in speech/ difficulty getting words out and right hemiparesis and was brought to [facility]. Patient reports that she gets an epigastric sensation preceding these episodes which is concerning for seizure, also lower suspicion for cerebrovascular event given recent negative workup.     Plan:  - C/w Keppra 250 BID  - vEEG when available  - c/w coumadin 2mg daily- monitor INR/PT  - LE doppler ordered given history of DVT with new RLE pain  - PT/OT 73 year old woman with PMH of DVT on Coumadin, HTN presents with right sided weakness and difficulty getting words out. Had similar episode last Saturday and went to [facility] where she had MRI Brain, CTA H/N and echo which were all negative. She then had another episode last night with rt hemiparesis and went to [place] and CT was negative and she was sent home. On day of admission, she presented with paucity in speech/ difficulty getting words out and right hemiparesis and was brought to [facility]. Patient reports that she gets an epigastric sensation preceding these episodes which is concerning for seizure, also lower suspicion for cerebrovascular event such as TIA given recent negative workup.   Neurologic exam reveals significant improvement in R sided motor function with mild residual weakness.     Plan:  - C/w Keppra 250 BID- uptitrate if events continue  - C/w coumadin 2mg daily- monitor INR/PT  - PT evaluation to determine disposition  - Outpatient f/u to schedule EEG monitoring  - RLE pain likely related to lumbar radiculopathy given distribution and description

## 2018-02-24 NOTE — PHYSICAL THERAPY INITIAL EVALUATION ADULT - ADDITIONAL COMMENTS
MRI brain 2/18- There is no cortical edema, mass effect or hydrocephalus. Cortical volume   and white matter signal are preserved. There is no evidence of acute infarct or previous parenchymal hemorrhage.   CTH: 2/23- Unremarkable noncontrast CT of the brain.No hemorrhage. No change since 1:25 AM.  Chest X ray 2/23/18 The heart is enlarged. Elevated right hemidiaphragm. No acute   consolidation could be identified. A granuloma is seen in the right lower lobe measuring 2 mm in diameter. Calcified aortic knob. .

## 2018-02-25 LAB
APTT BLD: 35.3 SEC — SIGNIFICANT CHANGE UP (ref 27.5–37.4)
HBA1C BLD-MCNC: 5.1 % — SIGNIFICANT CHANGE UP (ref 4–5.6)
INR BLD: 2.53 RATIO — HIGH (ref 0.88–1.16)
PROTHROM AB SERPL-ACNC: 29.1 SEC — HIGH (ref 10–13.1)
VIT B12 SERPL-MCNC: 1391 PG/ML — HIGH (ref 232–1245)

## 2018-02-25 PROCEDURE — 70551 MRI BRAIN STEM W/O DYE: CPT | Mod: 26

## 2018-02-25 PROCEDURE — 99232 SBSQ HOSP IP/OBS MODERATE 35: CPT | Mod: GC

## 2018-02-25 RX ORDER — WARFARIN SODIUM 2.5 MG/1
2 TABLET ORAL ONCE
Qty: 0 | Refills: 0 | Status: COMPLETED | OUTPATIENT
Start: 2018-02-25 | End: 2018-02-25

## 2018-02-25 RX ADMIN — PANTOPRAZOLE SODIUM 40 MILLIGRAM(S): 20 TABLET, DELAYED RELEASE ORAL at 05:10

## 2018-02-25 RX ADMIN — LEVETIRACETAM 250 MILLIGRAM(S): 250 TABLET, FILM COATED ORAL at 17:21

## 2018-02-25 RX ADMIN — ATORVASTATIN CALCIUM 80 MILLIGRAM(S): 80 TABLET, FILM COATED ORAL at 21:44

## 2018-02-25 RX ADMIN — WARFARIN SODIUM 2 MILLIGRAM(S): 2.5 TABLET ORAL at 21:44

## 2018-02-25 RX ADMIN — LEVETIRACETAM 250 MILLIGRAM(S): 250 TABLET, FILM COATED ORAL at 05:10

## 2018-02-25 NOTE — PROGRESS NOTE ADULT - ATTENDING COMMENTS
Patient continues to have right hemiparesis, now more prominently affecting right side of face with flattening of right NL fold.  Right arm pronator drift and impaired MAINE, and ataxia on finger to nose out of proportion to weakness. Right leg 4/5.      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  Head CT may show subtle hypodensity in left corona radiata?  Will obtain MRI brain to rule out acute/subacute infarct.    Lalo Palacios MD  EEG / Epilepsy Attending Physician

## 2018-02-25 NOTE — PROGRESS NOTE ADULT - ASSESSMENT
73 year old woman with PMH of DVT on Coumadin, HTN presents with right sided weakness and difficulty getting words out. Had similar episode last Saturday and went to [facility] where she had MRI Brain, CTA H/N and echo which were all negative. She then had another episode last night with rt hemiparesis and went to [place] and CT was negative and she was sent home. On day of admission, she presented with paucity in speech/ difficulty getting words out and right hemiparesis and was brought to [facility]. Patient reports that she gets an epigastric sensation preceding these episodes which is concerning for seizure, also lower suspicion for cerebrovascular event such as TIA given recent negative workup.   Neurologic exam today revela RUE, RLE weakness, RUE pronator drift mild RUE hyperreflexia,, cervical pathology also on the differentia;    Plan:  - C/w Keppra 250 BID- uptitrate if events continue  - C/w coumadin 2mg daily- monitor INR/PT  - PT recommends [facility]  - Outpatient f/u to schedule EEG monitoring  - Will order MRI C-spine, should not hold back DC 73 year old woman with PMH of DVT on Coumadin, HTN presents with right sided weakness and difficulty getting words out. Had similar episode last Saturday and went to [facility] where she had MRI Brain, CTA H/N and echo which were all negative. She then had another episode last night with rt hemiparesis and went to [place] and CT was negative and she was sent home. On day of admission, she presented with dysarthria and right hemiparesis and was brought to [facility]. Patient reports that she gets an epigastric sensation preceding these episodes which is concerning for seizure, also lower suspicion for cerebrovascular event such as TIA given recent negative workup.   Neurologic exam today reveals RNL lfattening RUE, RLE weakness, RUE pronator drift, RUE ataxia mild RUE hyperreflexia,, suspect left subcortical stroke ? L thalamo capsular, NIHSS 5, pre MRS 1  Plan:  -MRI brain w/o con  - C/w Keppra 250 BID for now if MRI + will DC  --C/w coumadin 2mg daily- monitor INR/PT  - a1c, LDL, will titrate statin to LDL  - PT recommends [facility]  - Outpatient f/u to schedule EEG monitoring  - reviewed CTA H/N at [place], wnl, reviewed TTE mild concentric LVH

## 2018-02-25 NOTE — PROGRESS NOTE ADULT - SUBJECTIVE AND OBJECTIVE BOX
Neurology Progress Note    S: Pt still c/o Right sided weakness    MEDICATIONS  (STANDING):  amLODIPine   Tablet 5 milliGRAM(s) Oral daily  atorvastatin 80 milliGRAM(s) Oral at bedtime  enalapril 10 milliGRAM(s) Oral daily  hydrochlorothiazide 12.5 milliGRAM(s) Oral daily  levETIRAcetam 250 milliGRAM(s) Oral two times a day  pantoprazole    Tablet 40 milliGRAM(s) Oral before breakfast  warfarin 2 milliGRAM(s) Oral once    MEDICATIONS  (PRN):  acetaminophen   Tablet. 650 milliGRAM(s) Oral every 6 hours PRN Mild Pain (1 - 3)  LORazepam   Injectable 2 milliGRAM(s) IV Push once PRN Seizure activity      Objective:   Vital Signs Last 24 Hrs  T(C): 37 (25 Feb 2018 08:00), Max: 37 (25 Feb 2018 08:00)  T(F): 98.6 (25 Feb 2018 08:00), Max: 98.6 (25 Feb 2018 08:00)  HR: 60 (25 Feb 2018 08:00) (51 - 60)  BP: 121/62 (25 Feb 2018 08:00) (117/71 - 133/69)  BP(mean): --  RR: 20 (25 Feb 2018 08:00) (18 - 20)  SpO2: 96% (25 Feb 2018 08:00) (95% - 97%)                 General Adult Exam  GENERAL APPEARANCE: Well developed, well nourished, alert and cooperative, and appears to be in no acute distress.    Neurological Exam:  Mental Status: Awake and alert, follows commands, speech fluent    Cranial Nerves: PERRL, EOMI, VFF, no facial asymmetry.  Tongue, uvula and palate midline.  Sternocleidomastoid and Trapezius intact bilaterally.    Motor:   Tone: normal.                  Strength:     [] Upper extremity                            Delt       Bicep    Tricep       RUE Pronator drift                                               R         4+/5       4+/5      4+/5     5/5                                               L          5/5        5/5        5/5       5/5  [] Lower extremity                             HF          KE          KF        DF         PF                                               R        4/5        4+/5        $+/5       5/5       5/5                                               L         5/5        5/5       5/5       5/5        5/5  Pronator drift: + RUE               Tremor: No resting, postural or action tremor.  No myoclonus.    Sensation: milldy decreased  to LT RLE    reflexes RUE brisker than BENJAMIN LUE 2+, RUE 2-3+ subtle spreading, subtle 2  bets clonus rLE, right planatar mute, left planater mute Babinslky, ? upgoing Oppenheimer      Other:                        13.0   6.7   )-----------( 226      ( 23 Feb 2018 11:26 )             41.7     02-23    140  |  100  |  13  ----------------------------<  103<H>  3.8   |  27  |  0.67    Ca    9.5      23 Feb 2018 11:26    TPro  8.1  /  Alb  4.2  /  TBili  0.5  /  DBili  x   /  AST  25  /  ALT  19  /  AlkPhos  105  02-23    LIVER FUNCTIONS - ( 23 Feb 2018 11:26 )  Alb: 4.2 g/dL / Pro: 8.1 g/dL / ALK PHOS: 105 U/L / ALT: 19 U/L RC / AST: 25 U/L / GGT: x           PT/INR - ( 23 Feb 2018 11:27 )   PT: 23.7 sec;   INR: 2.16 ratio         PTT - ( 23 Feb 2018 11:27 )  PTT:37.0 sec Neurology Progress Note    S: Pt still c/o Right sided weakness    MEDICATIONS  (STANDING):  amLODIPine   Tablet 5 milliGRAM(s) Oral daily  atorvastatin 80 milliGRAM(s) Oral at bedtime  enalapril 10 milliGRAM(s) Oral daily  hydrochlorothiazide 12.5 milliGRAM(s) Oral daily  levETIRAcetam 250 milliGRAM(s) Oral two times a day  pantoprazole    Tablet 40 milliGRAM(s) Oral before breakfast  warfarin 2 milliGRAM(s) Oral once    MEDICATIONS  (PRN):  acetaminophen   Tablet. 650 milliGRAM(s) Oral every 6 hours PRN Mild Pain (1 - 3)  LORazepam   Injectable 2 milliGRAM(s) IV Push once PRN Seizure activity      Objective:   Vital Signs Last 24 Hrs  T(C): 37 (25 Feb 2018 08:00), Max: 37 (25 Feb 2018 08:00)  T(F): 98.6 (25 Feb 2018 08:00), Max: 98.6 (25 Feb 2018 08:00)  HR: 60 (25 Feb 2018 08:00) (51 - 60)  BP: 121/62 (25 Feb 2018 08:00) (117/71 - 133/69)  BP(mean): --  RR: 20 (25 Feb 2018 08:00) (18 - 20)  SpO2: 96% (25 Feb 2018 08:00) (95% - 97%)                 General Adult Exam  GENERAL APPEARANCE: Well developed, well nourished, alert and cooperative, and appears to be in no acute distress.    Neurological Exam:  Mental Status: Awake and alert, follows commands, speech fluent    Cranial Nerves: PERRL, EOMI, VFF, right NL flattening, Tongue, uvula and palate midline.  Sternocleidomastoid and Trapezius intact bilaterally.    Motor:   Tone: normal.                  Strength:     [] Upper extremity                            Delt       Bicep    Tricep       RUE Pronator drift, RLE subtle drift                                               R         4+/5       4+/5      4+/5     5/5                                               L          5/5        5/5        5/5       5/5  [] Lower extremity                             HF          KE          KF        DF         PF                                               R        4/5        4+/5        $+/5       5/5       5/5                                               L         5/5        5/5       5/5       5/5        5/5  Pronator drift: + RUE               Tremor: No resting, postural or action tremor.  No myoclonus.    Sensation: milldy decreased  to LT RLE    coord RUE subtle ataxia    reflexes RUE brisker than BENJAMIN LUE 2+, RUE 2-3+ subtle spreading, subtle 2  bets clonus rLE, right planatar mute, left planater mute Babinslky, ? upgoing Oppenheimer      Other:                        13.0   6.7   )-----------( 226      ( 23 Feb 2018 11:26 )             41.7     02-23    140  |  100  |  13  ----------------------------<  103<H>  3.8   |  27  |  0.67    Ca    9.5      23 Feb 2018 11:26    TPro  8.1  /  Alb  4.2  /  TBili  0.5  /  DBili  x   /  AST  25  /  ALT  19  /  AlkPhos  105  02-23    LIVER FUNCTIONS - ( 23 Feb 2018 11:26 )  Alb: 4.2 g/dL / Pro: 8.1 g/dL / ALK PHOS: 105 U/L / ALT: 19 U/L RC / AST: 25 U/L / GGT: x           PT/INR - ( 23 Feb 2018 11:27 )   PT: 23.7 sec;   INR: 2.16 ratio         PTT - ( 23 Feb 2018 11:27 )  PTT:37.0 sec

## 2018-02-26 ENCOUNTER — TRANSCRIPTION ENCOUNTER (OUTPATIENT)
Age: 73
End: 2018-02-26

## 2018-02-26 DIAGNOSIS — R47.81 SLURRED SPEECH: ICD-10-CM

## 2018-02-26 DIAGNOSIS — G45.9 TRANSIENT CEREBRAL ISCHEMIC ATTACK, UNSPECIFIED: ICD-10-CM

## 2018-02-26 DIAGNOSIS — Z90.710 ACQUIRED ABSENCE OF BOTH CERVIX AND UTERUS: ICD-10-CM

## 2018-02-26 DIAGNOSIS — R79.1 ABNORMAL COAGULATION PROFILE: ICD-10-CM

## 2018-02-26 DIAGNOSIS — G81.91 HEMIPLEGIA, UNSPECIFIED AFFECTING RIGHT DOMINANT SIDE: ICD-10-CM

## 2018-02-26 DIAGNOSIS — Z79.52 LONG TERM (CURRENT) USE OF SYSTEMIC STEROIDS: ICD-10-CM

## 2018-02-26 DIAGNOSIS — Z88.0 ALLERGY STATUS TO PENICILLIN: ICD-10-CM

## 2018-02-26 DIAGNOSIS — E87.6 HYPOKALEMIA: ICD-10-CM

## 2018-02-26 DIAGNOSIS — R74.8 ABNORMAL LEVELS OF OTHER SERUM ENZYMES: ICD-10-CM

## 2018-02-26 DIAGNOSIS — Z79.01 LONG TERM (CURRENT) USE OF ANTICOAGULANTS: ICD-10-CM

## 2018-02-26 DIAGNOSIS — Z86.718 PERSONAL HISTORY OF OTHER VENOUS THROMBOSIS AND EMBOLISM: ICD-10-CM

## 2018-02-26 DIAGNOSIS — K21.9 GASTRO-ESOPHAGEAL REFLUX DISEASE WITHOUT ESOPHAGITIS: ICD-10-CM

## 2018-02-26 DIAGNOSIS — I10 ESSENTIAL (PRIMARY) HYPERTENSION: ICD-10-CM

## 2018-02-26 LAB
ANION GAP SERPL CALC-SCNC: 13 MMOL/L — SIGNIFICANT CHANGE UP (ref 5–17)
APTT BLD: 36.8 SEC — SIGNIFICANT CHANGE UP (ref 27.5–37.4)
BUN SERPL-MCNC: 14 MG/DL — SIGNIFICANT CHANGE UP (ref 7–23)
CALCIUM SERPL-MCNC: 9 MG/DL — SIGNIFICANT CHANGE UP (ref 8.4–10.5)
CHLORIDE SERPL-SCNC: 104 MMOL/L — SIGNIFICANT CHANGE UP (ref 96–108)
CHOLEST SERPL-MCNC: 112 MG/DL — SIGNIFICANT CHANGE UP (ref 10–199)
CO2 SERPL-SCNC: 25 MMOL/L — SIGNIFICANT CHANGE UP (ref 22–31)
CREAT SERPL-MCNC: 0.7 MG/DL — SIGNIFICANT CHANGE UP (ref 0.5–1.3)
GLUCOSE SERPL-MCNC: 90 MG/DL — SIGNIFICANT CHANGE UP (ref 70–99)
HCT VFR BLD CALC: 38.2 % — SIGNIFICANT CHANGE UP (ref 34.5–45)
HDLC SERPL-MCNC: 49 MG/DL — SIGNIFICANT CHANGE UP (ref 40–125)
HGB BLD-MCNC: 11.2 G/DL — LOW (ref 11.5–15.5)
INR BLD: 2.71 RATIO — HIGH (ref 0.88–1.16)
LIPID PNL WITH DIRECT LDL SERPL: 54 MG/DL — SIGNIFICANT CHANGE UP
MCHC RBC-ENTMCNC: 19.1 PG — LOW (ref 27–34)
MCHC RBC-ENTMCNC: 29.3 GM/DL — LOW (ref 32–36)
MCV RBC AUTO: 65.3 FL — LOW (ref 80–100)
PLATELET # BLD AUTO: 189 K/UL — SIGNIFICANT CHANGE UP (ref 150–400)
POTASSIUM SERPL-MCNC: 4.3 MMOL/L — SIGNIFICANT CHANGE UP (ref 3.5–5.3)
POTASSIUM SERPL-SCNC: 4.3 MMOL/L — SIGNIFICANT CHANGE UP (ref 3.5–5.3)
PROTHROM AB SERPL-ACNC: 31.3 SEC — HIGH (ref 10–13.1)
RBC # BLD: 5.85 M/UL — HIGH (ref 3.8–5.2)
RBC # FLD: 14.8 % — HIGH (ref 10.3–14.5)
SODIUM SERPL-SCNC: 142 MMOL/L — SIGNIFICANT CHANGE UP (ref 135–145)
TOTAL CHOLESTEROL/HDL RATIO MEASUREMENT: 2.3 RATIO — LOW (ref 3.3–7.1)
TRIGL SERPL-MCNC: 44 MG/DL — SIGNIFICANT CHANGE UP (ref 10–149)
WBC # BLD: 7.12 K/UL — SIGNIFICANT CHANGE UP (ref 3.8–10.5)
WBC # FLD AUTO: 7.12 K/UL — SIGNIFICANT CHANGE UP (ref 3.8–10.5)

## 2018-02-26 PROCEDURE — 99233 SBSQ HOSP IP/OBS HIGH 50: CPT

## 2018-02-26 RX ORDER — WARFARIN SODIUM 2.5 MG/1
2 TABLET ORAL ONCE
Qty: 0 | Refills: 0 | Status: COMPLETED | OUTPATIENT
Start: 2018-02-26 | End: 2018-02-26

## 2018-02-26 RX ORDER — ATORVASTATIN CALCIUM 80 MG/1
1 TABLET, FILM COATED ORAL
Qty: 0 | Refills: 0 | COMMUNITY
Start: 2018-02-26

## 2018-02-26 RX ORDER — AMLODIPINE BESYLATE 2.5 MG/1
1 TABLET ORAL
Qty: 0 | Refills: 0 | DISCHARGE
Start: 2018-02-26

## 2018-02-26 RX ORDER — AMLODIPINE BESYLATE 2.5 MG/1
1 TABLET ORAL
Qty: 0 | Refills: 0 | COMMUNITY

## 2018-02-26 RX ORDER — POLYETHYLENE GLYCOL 3350 17 G/17G
17 POWDER, FOR SOLUTION ORAL DAILY
Qty: 0 | Refills: 0 | Status: DISCONTINUED | OUTPATIENT
Start: 2018-02-26 | End: 2018-02-27

## 2018-02-26 RX ORDER — ATORVASTATIN CALCIUM 80 MG/1
40 TABLET, FILM COATED ORAL AT BEDTIME
Qty: 0 | Refills: 0 | Status: DISCONTINUED | OUTPATIENT
Start: 2018-02-26 | End: 2018-02-27

## 2018-02-26 RX ADMIN — PANTOPRAZOLE SODIUM 40 MILLIGRAM(S): 20 TABLET, DELAYED RELEASE ORAL at 05:07

## 2018-02-26 RX ADMIN — LEVETIRACETAM 250 MILLIGRAM(S): 250 TABLET, FILM COATED ORAL at 05:07

## 2018-02-26 RX ADMIN — ATORVASTATIN CALCIUM 40 MILLIGRAM(S): 80 TABLET, FILM COATED ORAL at 21:29

## 2018-02-26 RX ADMIN — AMLODIPINE BESYLATE 5 MILLIGRAM(S): 2.5 TABLET ORAL at 05:07

## 2018-02-26 RX ADMIN — Medication 12.5 MILLIGRAM(S): at 05:07

## 2018-02-26 RX ADMIN — Medication 10 MILLIGRAM(S): at 05:07

## 2018-02-26 RX ADMIN — Medication 650 MILLIGRAM(S): at 23:46

## 2018-02-26 RX ADMIN — WARFARIN SODIUM 2 MILLIGRAM(S): 2.5 TABLET ORAL at 21:28

## 2018-02-26 NOTE — PROVIDER CONTACT NOTE (OTHER) - SITUATION
pt states in the morning she feels weak and her chest feels heavy when first getting up in the mornings and after she drinks juice or eats she feels better

## 2018-02-26 NOTE — DISCHARGE NOTE ADULT - NS AS DC STROKE ED MATERIALS
Need for Followup After Discharge/Risk Factors for Stroke/Prescribed Medications/Stroke Warning Signs and Symptoms/Call 911 for Stroke/Stroke Education Booklet

## 2018-02-26 NOTE — DISCHARGE NOTE ADULT - CARE PLAN
Principal Discharge DX:	Stroke  Goal:	Prevent recurrence  Assessment and plan of treatment:	Take medication as prescribed  Follow up with neurologist Principal Discharge DX:	Stroke  Goal:	Prevent recurrence  Assessment and plan of treatment:	Take medication as prescribed  Follow up with neurologist  Please follow up with your Rheumatologist

## 2018-02-26 NOTE — PROVIDER CONTACT NOTE (OTHER) - REASON
pt states in the morning she feels weak and her chest feels heavy when first getting up in the mornings

## 2018-02-26 NOTE — DISCHARGE NOTE ADULT - CARE PROVIDER_API CALL
Sandro Lewis (MB[employer]), Neurology; Vascular Neurology  [address] [address]  Phone: [phone]  Fax: [phone]

## 2018-02-26 NOTE — DISCHARGE NOTE ADULT - PATIENT PORTAL LINK FT
You can access the [facility], offered by [facility], by registering with the following website: [email or web address]

## 2018-02-26 NOTE — DISCHARGE NOTE ADULT - HOSPITAL COURSE
73 year old Yakut speaking woman with PMH of DVT on Coumadin, HTN presents with right sided weakness and difficulty getting words out. As per son at bedside, she had a similar episode on Saturday and was taken to [facility] where they did a stroke work up with MRI, echo, and CTA, all of which came up negative and symptoms resolved within 2 hours. Last night she had another similar episode where she could not move her right side as well and went to [place] which got better by 60%. She was observed and sent home. This morning they came to [facility] because she had rt sided weakness and difficulty expressing herself which she felt was more severe. She states that she knows what she wants to say but has a hard time getting the words out. Patient reports she has a feeling of epigastric rising sensation before these episodes happen and sometimes gets numbness on both hands when the symptoms begin. She also had an episode of atonia with one of the episodes where she just collapsed. Denies any headache, N/V. No tongue biting or urinary incontinence reported. NIHSS-5, MRS-0.    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 Patient on coumadin for DVT. Stable for discharge to [facility] with outpatient follow up. "73 year old Arabic speaking woman with PMH of DVT on Coumadin, HTN presents with right sided weakness and difficulty getting words out. As per son at bedside, she had a similar episode on Saturday and was taken to [facility] where they did a stroke work up with MRI, echo, and CTA, all of which came up negative and symptoms resolved within 2 hours. Last night she had another similar episode where she could not move her right side as well and went to [place] which got better by 60%. She was observed and sent home. This morning they came to [facility] because she had rt sided weakness and difficulty expressing herself which she felt was more severe. She states that she knows what she wants to say but has a hard time getting the words out. Patient reports she has a feeling of epigastric rising sensation before these episodes happen and sometimes gets numbness on both hands when the symptoms begin. She also had an episode of atonia with one of the episodes where she just collapsed. Denies any headache, N/V. No tongue biting or urinary incontinence reported. NIHSS-5, MRS-0."    Patient found to have left BG/CR infarction likely secondary to small vessel disease. 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 Admission uneventful. Started on Gabapentin 100 BID for right sided dysthesias, started on prednisone 10 for oral ulcers. INR on day of Dc 2/27 was 2.8, would redose Coumadin 2 mg tonight. Stable for discharge to [facility] with outpatient follow up.      < from: MR Head No Cont (02.25.18 @ 19:08) >  IMPRESSION: Acute left basal ganglia and coronaradiata infarct on   diffusion-weighted imaging. No hemorrhage. Small vessel white matter   ischemic changes.    < end of copied text >

## 2018-02-26 NOTE — DISCHARGE NOTE ADULT - PLAN OF CARE
Prevent recurrence Take medication as prescribed  Follow up with neurologist Take medication as prescribed  Follow up with neurologist  Please follow up with your Rheumatologist

## 2018-02-26 NOTE — PROGRESS NOTE ADULT - SUBJECTIVE AND OBJECTIVE BOX
Neurology Progress Note    S: Pt still c/o Right sided weakness  MEDICATIONS  (STANDING):  amLODIPine   Tablet 5 milliGRAM(s) Oral daily  atorvastatin 80 milliGRAM(s) Oral at bedtime  enalapril 10 milliGRAM(s) Oral daily  pantoprazole    Tablet 40 milliGRAM(s) Oral before breakfast    MEDICATIONS  (PRN):  acetaminophen   Tablet. 650 milliGRAM(s) Oral every 6 hours PRN Mild Pain (1 - 3)  LORazepam   Injectable 2 milliGRAM(s) IV Push once PRN Seizure activity      Vital Signs Last 24 Hrs  T(C): 36.6 (26 Feb 2018 08:03), Max: 36.9 (25 Feb 2018 21:19)  T(F): 97.9 (26 Feb 2018 08:03), Max: 98.5 (25 Feb 2018 21:19)  HR: 62 (26 Feb 2018 08:03) (56 - 67)  BP: 128/68 (26 Feb 2018 08:03) (128/68 - 144/69)  BP(mean): --  RR: 17 (26 Feb 2018 08:03) (17 - 22)  SpO2: 96% (26 Feb 2018 08:03) (95% - 97%)    General Adult Exam  GENERAL APPEARANCE: Well developed, well nourished, alert and cooperative, and appears to be in no acute distress.    Neurological Exam:  Mental Status: Awake and alert, follows commands, speech fluent    Cranial Nerves: PERRL, EOMI, VFF, subtle right NL flattening, Tongue, uvula and palate midline.  Sternocleidomastoid and Trapezius intact bilaterally.    Motor:   Tone: normal.                  Strength:     [] Upper extremity                            Delt       Bicep    Tricep       RUE Pronator drift, RLE some effort against gravity                                               R         4+/5       4+/5      4+/5    4+/5                                               L          5/5        5/5        5/5       5/5  [] Lower extremity                             HF          KE          KF        DF         PF                                               R        3/5        4-/5        4/5       4-/5       4/5                                               L         5/5        5/5       5/5       5/5        5/5  Pronator drift: + RUE               Tremor: No resting, postural or action tremor.  No myoclonus.    Sensation: milldy decreased  to LT RLE    coord RUE subtle ataxia    reflexes RUE brisker than BENJAMIN LUE 2+, RUE 2-3+    Other:        02-26    142  |  104  |  14  ----------------------------<  90  4.3   |  25  |  0.70    Ca    9.0      26 Feb 2018 07:27      CAPILLARY BLOOD GLUCOSE Neurology Progress Note    S: Pt still c/o Right sided weakness, anxious    MEDICATIONS  (STANDING):  amLODIPine   Tablet 5 milliGRAM(s) Oral daily  atorvastatin 80 milliGRAM(s) Oral at bedtime  enalapril 10 milliGRAM(s) Oral daily  pantoprazole    Tablet 40 milliGRAM(s) Oral before breakfast    MEDICATIONS  (PRN):  acetaminophen   Tablet. 650 milliGRAM(s) Oral every 6 hours PRN Mild Pain (1 - 3)    Vital Signs Last 24 Hrs  T(C): 36.6 (26 Feb 2018 08:03), Max: 36.9 (25 Feb 2018 21:19)  T(F): 97.9 (26 Feb 2018 08:03), Max: 98.5 (25 Feb 2018 21:19)  HR: 62 (26 Feb 2018 08:03) (56 - 67)  BP: 128/68 (26 Feb 2018 08:03) (128/68 - 144/69)  RR: 17 (26 Feb 2018 08:03) (17 - 22)  SpO2: 96% (26 Feb 2018 08:03) (95% - 97%)    General Adult Exam  GENERAL APPEARANCE: Well developed, well nourished, alert and cooperative, and appears to be in no acute distress.    Neurological Exam:  Mental Status: Awake and alert, follows commands, speech fluent.  mildly anxious    Cranial Nerves: PERRL, EOMI, VFF, subtle right NL flattening, Tongue, uvula and palate midline.  Sternocleidomastoid and Trapezius intact bilaterally.  slightly dysarthric    Motor:   Tone: normal.                  Strength:     [] Upper extremity                            Delt       Bicep    Tricep       RUE Pronator drift, RLE some effort against gravity                                               R         4+/5       4+/5      4+/5    4+/5                                               L          5/5        5/5        5/5       5/5  [] Lower extremity                             HF          KE          KF        DF         PF                                               R        3/5        4-/5        4/5       4-/5       4/5                                               L         5/5        5/5       5/5       5/5        5/5  Pronator drift: + RUE               Tremor: No resting, postural or action tremor.  No myoclonus.    Sensation: mildy decreased  to LT RLE prox and distally    coord RUE subtle ataxia    reflexes RUE brisker than BENJAMIN LUE 2+, RUE 2-3+    Other:        02-26    142  |  104  |  14  ----------------------------<  90  4.3   |  25  |  0.70    Ca    9.0      26 Feb 2018 07:27 Neurology Progress Note    S: Pt still c/o Right sided weakness, anxious    MEDICATIONS  (STANDING):  amLODIPine   Tablet 5 milliGRAM(s) Oral daily  atorvastatin 80 milliGRAM(s) Oral at bedtime  enalapril 10 milliGRAM(s) Oral daily  pantoprazole    Tablet 40 milliGRAM(s) Oral before breakfast    MEDICATIONS  (PRN):  acetaminophen   Tablet. 650 milliGRAM(s) Oral every 6 hours PRN Mild Pain (1 - 3)    Vital Signs Last 24 Hrs  T(C): 36.6 (26 Feb 2018 08:03), Max: 36.9 (25 Feb 2018 21:19)  T(F): 97.9 (26 Feb 2018 08:03), Max: 98.5 (25 Feb 2018 21:19)  HR: 62 (26 Feb 2018 08:03) (56 - 67)  BP: 128/68 (26 Feb 2018 08:03) (128/68 - 144/69)  RR: 17 (26 Feb 2018 08:03) (17 - 22)  SpO2: 96% (26 Feb 2018 08:03) (95% - 97%)    General Adult Exam  GENERAL APPEARANCE: Well developed, well nourished, alert and cooperative, and appears to be in no acute distress.    Neurological Exam:  Mental Status: Awake and alert, follows commands, speech fluent.  mildly anxious    Cranial Nerves: PERRL, EOMI, VFF, subtle right NL flattening, Tongue, uvula and palate midline.  Sternocleidomastoid and Trapezius intact bilaterally.  slightly dysarthric    Motor:   Tone: normal.                  Strength:     [] Upper extremity                            Delt       Bicep    Tricep       RUE Pronator drift, RLE some effort against gravity                                               R         4+/5       4+/5      4+/5    4+/5                                               L          5/5        5/5        5/5       5/5  [] Lower extremity                             HF          KE          KF        DF         PF                                               R        3/5        4-/5        4/5       4-/5       4/5                                               L         5/5        5/5       5/5       5/5        5/5  Pronator drift: + RUE               Tremor: No resting, postural or action tremor.  No myoclonus.    Sensation: mildy decreased  to LT RLE prox and distally    coord RUE subtle ataxia    reflexes RUE brisker than BENJAMIN LUE 2+, RUE 2-3+    Other:        02-26    142  |  104  |  14  ----------------------------<  90  4.3   |  25  |  0.70    Ca    9.0      26 Feb 2018 07:27    CORE MEASURES:        Admission NIHSS: 5     TPA: [] YES [x] NO      LDL54     Depression Screen: 0     Statin Therapy: y     Dysphagia Screen: [x] PASS [] FAIL     Smoking [] YES [x] NO      Afib [] YES [x] NO     Stroke Education [x] YES [] NO

## 2018-02-26 NOTE — DISCHARGE NOTE ADULT - MEDICATION SUMMARY - MEDICATIONS TO STOP TAKING
I will STOP taking the medications listed below when I get home from the hospital:    hydroCHLOROthiazide 12.5 mg oral tablet  -- 1 tab(s) by mouth once a day    aspirin 81 mg oral tablet  -- 1 tab(s) by mouth once a day

## 2018-02-26 NOTE — DISCHARGE NOTE ADULT - MEDICATION SUMMARY - MEDICATIONS TO TAKE
I will START or STAY ON the medications listed below when I get home from the hospital:    predniSONE 10 mg oral tablet  -- 1 tab(s) by mouth once a day  -- Indication: For Bechet's ulcers    Tylenol 500 mg oral tablet  -- 1 tab(s) by mouth , As Needed  -- Indication: For prn pain    enalapril 10 mg oral tablet  -- 1 tab(s) by mouth once a day  -- Indication: For HTN    Coumadin 2 mg oral tablet  -- 1 tab(s) by mouth once a day  -- Indication: For HX of DVT    gabapentin 100 mg oral capsule  -- 1 cap(s) by mouth 2 times a day  -- Indication: For dysthesia    atorvastatin 40 mg oral tablet  -- 1 tab(s) by mouth once a day (at bedtime)  -- Indication: For Stroke    amLODIPine 5 mg oral tablet  -- 1 tab(s) by mouth once a day  -- Indication: For HTN    polyethylene glycol 3350 oral powder for reconstitution  -- 17 gram(s) by mouth once a day  -- Indication: For constipation    pantoprazole 40 mg oral delayed release tablet  -- 1 tab(s) by mouth once a day  -- Indication: For GERD

## 2018-02-26 NOTE — PROGRESS NOTE ADULT - ASSESSMENT
73 year old woman with PMH of DVT on Coumadin, HTN presents with right sided weakness and dysarthria  Neurologic exam today reveals RNL lfattening RUE, right sided weakness, RUE pronator drift, RUE ataxia mild RUE hyperreflexia, MRI suggests left thalamocapsular/ CR infacrt    -DC Keppra  -will hold HCTZ  --C/w coumadin 2mg daily- monitor INR/PT  - a1c 5.1, LDL 54,   - PT recommends YUMI  - Outpatient f/u to schedule EEG monitoring  - reviewed CTA H/N at [place], [facility], reviewed TTE mild concentric LVH 73 year old woman with PMH of DVT on Coumadin already, HTN, high chol, presents with right sided weakness and dysarthria with stuttering onset.  Neurologic exam today reveals RNL flattening RUE, right sided weakness, RUE pronator drift, RUE ataxia mild RUE hyperreflexia, MRI shows left thalamocapsular/ CR infarct    - DC Keppra, ativan orders.  seizures are not likely.  - d/c EEG monitoring order  - will hold HCTZ for slight permissive BPs  - C/w coumadin 2mg daily- monitor INR/PT.  no ASA or plavix due to coumadin  - a1c 5.1, LDL 54,   - PT recommends YUMI  - reviewed CTA H/N at [place], [facility], reviewed TTE mild concentric LVH  - monitor mood, consider SSRI if worsening anxiety, adjustment issues

## 2018-02-27 VITALS
SYSTOLIC BLOOD PRESSURE: 144 MMHG | TEMPERATURE: 97 F | OXYGEN SATURATION: 98 % | HEART RATE: 78 BPM | DIASTOLIC BLOOD PRESSURE: 83 MMHG | RESPIRATION RATE: 18 BRPM

## 2018-02-27 LAB
APTT BLD: 38.1 SEC — HIGH (ref 27.5–37.4)
INR BLD: 2.82 RATIO — HIGH (ref 0.88–1.16)
PROTHROM AB SERPL-ACNC: 32.6 SEC — HIGH (ref 10–13.1)

## 2018-02-27 PROCEDURE — 86900 BLOOD TYPING SEROLOGIC ABO: CPT

## 2018-02-27 PROCEDURE — 85027 COMPLETE CBC AUTOMATED: CPT

## 2018-02-27 PROCEDURE — 99232 SBSQ HOSP IP/OBS MODERATE 35: CPT

## 2018-02-27 PROCEDURE — 86850 RBC ANTIBODY SCREEN: CPT

## 2018-02-27 PROCEDURE — 83036 HEMOGLOBIN GLYCOSYLATED A1C: CPT

## 2018-02-27 PROCEDURE — 97110 THERAPEUTIC EXERCISES: CPT

## 2018-02-27 PROCEDURE — 80053 COMPREHEN METABOLIC PANEL: CPT

## 2018-02-27 PROCEDURE — 85730 THROMBOPLASTIN TIME PARTIAL: CPT

## 2018-02-27 PROCEDURE — 71045 X-RAY EXAM CHEST 1 VIEW: CPT

## 2018-02-27 PROCEDURE — 82553 CREATINE MB FRACTION: CPT

## 2018-02-27 PROCEDURE — 70450 CT HEAD/BRAIN W/O DYE: CPT

## 2018-02-27 PROCEDURE — 85610 PROTHROMBIN TIME: CPT

## 2018-02-27 PROCEDURE — 82607 VITAMIN B-12: CPT

## 2018-02-27 PROCEDURE — 80048 BASIC METABOLIC PNL TOTAL CA: CPT

## 2018-02-27 PROCEDURE — 83921 ORGANIC ACID SINGLE QUANT: CPT

## 2018-02-27 PROCEDURE — 96374 THER/PROPH/DIAG INJ IV PUSH: CPT

## 2018-02-27 PROCEDURE — 82962 GLUCOSE BLOOD TEST: CPT

## 2018-02-27 PROCEDURE — 84484 ASSAY OF TROPONIN QUANT: CPT

## 2018-02-27 PROCEDURE — 97116 GAIT TRAINING THERAPY: CPT

## 2018-02-27 PROCEDURE — 97161 PT EVAL LOW COMPLEX 20 MIN: CPT

## 2018-02-27 PROCEDURE — 93005 ELECTROCARDIOGRAM TRACING: CPT

## 2018-02-27 PROCEDURE — 70551 MRI BRAIN STEM W/O DYE: CPT

## 2018-02-27 PROCEDURE — 80061 LIPID PANEL: CPT

## 2018-02-27 PROCEDURE — 82550 ASSAY OF CK (CPK): CPT

## 2018-02-27 PROCEDURE — 86901 BLOOD TYPING SEROLOGIC RH(D): CPT

## 2018-02-27 PROCEDURE — 99285 EMERGENCY DEPT VISIT HI MDM: CPT | Mod: 25

## 2018-02-27 RX ORDER — GABAPENTIN 400 MG/1
1 CAPSULE ORAL
Qty: 0 | Refills: 0 | COMMUNITY
Start: 2018-02-27

## 2018-02-27 RX ORDER — POLYETHYLENE GLYCOL 3350 17 G/17G
17 POWDER, FOR SOLUTION ORAL
Qty: 0 | Refills: 0 | DISCHARGE
Start: 2018-02-27

## 2018-02-27 RX ORDER — ATORVASTATIN CALCIUM 80 MG/1
1 TABLET, FILM COATED ORAL
Qty: 0 | Refills: 0 | DISCHARGE
Start: 2018-02-27

## 2018-02-27 RX ORDER — GABAPENTIN 400 MG/1
100 CAPSULE ORAL
Qty: 0 | Refills: 0 | Status: DISCONTINUED | OUTPATIENT
Start: 2018-02-27 | End: 2018-02-27

## 2018-02-27 RX ADMIN — Medication 10 MILLIGRAM(S): at 11:48

## 2018-02-27 RX ADMIN — AMLODIPINE BESYLATE 5 MILLIGRAM(S): 2.5 TABLET ORAL at 05:42

## 2018-02-27 RX ADMIN — PANTOPRAZOLE SODIUM 40 MILLIGRAM(S): 20 TABLET, DELAYED RELEASE ORAL at 05:42

## 2018-02-27 RX ADMIN — Medication 650 MILLIGRAM(S): at 00:30

## 2018-02-27 RX ADMIN — Medication 10 MILLIGRAM(S): at 05:42

## 2018-02-27 NOTE — PROGRESS NOTE ADULT - SUBJECTIVE AND OBJECTIVE BOX
Neurology Progress Note    S: Pt still c/o Right sided weakness, anxious    MEDICATIONS  (STANDING):  amLODIPine   Tablet 5 milliGRAM(s) Oral daily  atorvastatin 40 milliGRAM(s) Oral at bedtime  enalapril 10 milliGRAM(s) Oral daily  pantoprazole    Tablet 40 milliGRAM(s) Oral before breakfast  polyethylene glycol 3350 17 Gram(s) Oral daily  predniSONE   Tablet 10 milliGRAM(s) Oral daily    MEDICATIONS  (PRN):  acetaminophen   Tablet. 650 milliGRAM(s) Oral every 6 hours PRN Mild Pain (1 - 3)  LORazepam   Injectable 2 milliGRAM(s) IV Push once PRN Seizure activity  Vital Signs Last 24 Hrs  T(C): 36.7 (27 Feb 2018 09:14), Max: 36.9 (26 Feb 2018 21:02)  T(F): 98.1 (27 Feb 2018 09:14), Max: 98.4 (26 Feb 2018 21:02)  HR: 85 (27 Feb 2018 09:14) (50 - 85)  BP: 118/65 (27 Feb 2018 09:14) (118/65 - 147/80)  BP(mean): --  RR: 18 (27 Feb 2018 09:14) (18 - 20)  SpO2: 98% (27 Feb 2018 09:14) (96% - 99%)    General Adult Exam  GENERAL APPEARANCE: Well developed, well nourished, alert and cooperative, and appears to be in no acute distress.    Neurological Exam:  Mental Status: Awake and alert, follows commands, speech fluent.  mildly anxious    Cranial Nerves: PERRL, EOMI, VFF, subtle right NL flattening, Tongue, uvula and palate midline.  Sternocleidomastoid and Trapezius intact bilaterally.  slightly dysarthric    Motor:   Tone: normal.                  Strength:     [] Upper extremity                            Delt       Bicep    Tricep       RUE Pronator drift, RLE some effort against gravity                                               R         4+/5       4+/5      4+/5    4+/5                                               L          5/5        5/5        5/5       5/5  [] Lower extremity                             HF          KE          KF        DF         PF                                               R        4-/5        4+/5        4/5       4-/5       4/5                                               L         5/5        5/5       5/5       5/5        5/5  Pronator drift: + RUE               Tremor: No resting, postural or action tremor.  No myoclonus.    Sensation: mildy decreased  to LT RLE prox and distally    coord RUE subtle ataxia    reflexes RUE brisker than BENJAMIN LUE 2+, RUE 2-3+    Other:        02-26    142  |  104  |  14  ----------------------------<  90  4.3   |  25  |  0.70    Ca    9.0      26 Feb 2018 07:27    CORE MEASURES:        Admission NIHSS: 5     TPA: [] YES [x] NO      LDL54     Depression Screen: 0     Statin Therapy: y     Dysphagia Screen: [x] PASS [] FAIL     Smoking [] YES [x] NO      Afib [] YES [x] NO     Stroke Education [x] YES [] NO Neurology Progress Note    S: Pt still c/o Right sided weakness, anxious.  c/o ulcerations in mouth    MEDICATIONS  (STANDING):  amLODIPine   Tablet 5 milliGRAM(s) Oral daily  atorvastatin 40 milliGRAM(s) Oral at bedtime  enalapril 10 milliGRAM(s) Oral daily  pantoprazole    Tablet 40 milliGRAM(s) Oral before breakfast  polyethylene glycol 3350 17 Gram(s) Oral daily  predniSONE   Tablet 10 milliGRAM(s) Oral daily today    Vital Signs Last 24 Hrs  T(C): 36.7 (27 Feb 2018 09:14), Max: 36.9 (26 Feb 2018 21:02)  T(F): 98.1 (27 Feb 2018 09:14), Max: 98.4 (26 Feb 2018 21:02)  HR: 85 (27 Feb 2018 09:14) (50 - 85)  BP: 118/65 (27 Feb 2018 09:14) (118/65 - 147/80)  RR: 18 (27 Feb 2018 09:14) (18 - 20)  SpO2: 98% (27 Feb 2018 09:14) (96% - 99%)    General Adult Exam  GENERAL APPEARANCE: Well developed, well nourished, alert and cooperative, and appears to be in no acute distress.  oral ulcerations noted, poor dentition.    Neurological Exam:  Mental Status: Awake and alert, follows commands, speech fluent.  mildly anxious    Cranial Nerves: PERRL, EOMI, VFF, subtle right NL flattening, Tongue, uvula and palate midline.  Sternocleidomastoid and Trapezius intact bilaterally.  slightly dysarthric    Motor:   Tone: normal.                  Strength:     [] Upper extremity                            Delt       Bicep    Tricep       RUE Pronator drift, RLE some effort against gravity                                               R         4+/5       4+/5      4+/5    4+/5                                               L          5/5        5/5        5/5       5/5  [] Lower extremity                             HF          KE          KF        DF         PF                                               R        4-/5        4+/5        4/5       4-/5       4/5                                               L         5/5        5/5       5/5       5/5        5/5  Pronator drift: + RUE               Tremor: No resting, postural or action tremor.  No myoclonus.    Sensation: mildy decreased  to LT RLE prox and distally    coord RUE subtle ataxia    reflexes RUE brisker than BENJAMIN LUE 2+, RUE 2-3+    Other:        02-26    142  |  104  |  14  ----------------------------<  90  4.3   |  25  |  0.70    Ca    9.0      26 Feb 2018 07:27    CORE MEASURES:        Admission NIHSS: 5     TPA: [] YES [x] NO      LDL54     Depression Screen: 0     Statin Therapy: y     Dysphagia Screen: [x] PASS [] FAIL     Smoking [] YES [x] NO      Afib [] YES [x] NO     Stroke Education [x] YES [] NO

## 2018-02-27 NOTE — PROGRESS NOTE ADULT - ASSESSMENT
73 year old woman with PMH of DVT on Coumadin already, HTN, high chol, presents with right sided weakness and dysarthria with stuttering onset.  Neurologic exam today reveals RNL flattening RUE, right sided weakness, RUE pronator drift, RUE ataxia mild RUE hyperreflexia, MRI shows left thalamocapsular/ CR infarct    - DC Keppra, ativan orders.  seizures are not likely.  - d/c EEG monitoring order  - will hold HCTZ for slight permissive BPs  -start gabapentin 100 BID for right sided dysthesias  -prednisone 10 mg for ulcers in setting of Bechet's  - C/w coumadin 2mg daily- monitor INR/PT.  no ASA or plavix due to coumadin  - a1c 5.1, LDL 54,   - PT recommends YUMI  - reviewed CTA H/N at [place], [facility], reviewed TTE mild concentric LVH  - monitor mood, consider SSRI if worsening anxiety, adjustment issues 73 year old woman with PMH of DVT on Coumadin already, HTN, high chol, presents with right sided weakness and dysarthria with stuttering onset.  Neurologic exam today reveals RNL flattening RUE, right sided weakness, RUE pronator drift, RUE ataxia mild RUE hyperreflexia, MRI shows left thalamocapsular/ CR infarct  Bechets dz, with oral ulceration.  Stroke appears small vessel and not related to vasculitis per CTA, MRI, and d/w stroke attending.  Reviewed CTA H/N at [place], [facility], reviewed TTE mild concentric LVH    - DC Keppra, ativan orders.  seizures are not likely.  - will hold HCTZ for slight permissive BPs  - start gabapentin 100 BID for right sided dysthesias, ?dejerine roussy syndrome   -prednisone 10 mg for ulcers in setting of Bechet's, low likelihood of excerbating stroke if FSG ok  - C/w coumadin 2mg daily- monitor INR/PT.  no ASA or plavix due to coumadin  - a1c 5.1, LDL 54,   - PT recommends YUMI, d/c pending  - monitor mood, consider SSRI if worsening anxiety, adjustment issues

## 2018-02-27 NOTE — PROVIDER CONTACT NOTE (OTHER) - ASSESSMENT
VSS. family reports pt is not eating much
Patient alert, Left side strong, Right side upper and lower weakness. RLE some effort, c/o of cramping and spasm, states feels different from DVT leg pain
Pt. c/o headache. She stated that pain is beaable

## 2018-02-27 NOTE — PROVIDER CONTACT NOTE (OTHER) - ACTION/TREATMENT ORDERED:
maybe pt has low blood sugar In the morning. states to do an AM FS tomorrow morning. and they will order ensure for pt.
NP notified, b/l doppler ordered, will order prn pain meds. Hold b/l PAS at this time. Hot packs given to patient and repositioned to decrease pressure. Will continue to monitor.
Tylenol given.

## 2018-02-28 ENCOUNTER — APPOINTMENT (OUTPATIENT)
Dept: NEUROLOGY | Facility: CLINIC | Age: 73
End: 2018-02-28

## 2018-02-28 LAB — METHYLMALONATE SERPL-SCNC: 155 NMOL/L — SIGNIFICANT CHANGE UP (ref 0–378)

## 2018-04-10 ENCOUNTER — INPATIENT (INPATIENT)
Facility: HOSPITAL | Age: 73
LOS: 1 days | Discharge: HOME | End: 2018-04-12
Attending: FAMILY MEDICINE | Admitting: FAMILY MEDICINE
Payer: MEDICARE

## 2018-04-10 VITALS
HEART RATE: 65 BPM | WEIGHT: 143.3 LBS | RESPIRATION RATE: 18 BRPM | TEMPERATURE: 98 F | OXYGEN SATURATION: 100 % | DIASTOLIC BLOOD PRESSURE: 55 MMHG | SYSTOLIC BLOOD PRESSURE: 124 MMHG | HEIGHT: 68 IN

## 2018-04-10 DIAGNOSIS — I63.9 CEREBRAL INFARCTION, UNSPECIFIED: ICD-10-CM

## 2018-04-10 DIAGNOSIS — Z90.710 ACQUIRED ABSENCE OF BOTH CERVIX AND UTERUS: Chronic | ICD-10-CM

## 2018-04-10 LAB
ALBUMIN SERPL ELPH-MCNC: 3.3 G/DL — SIGNIFICANT CHANGE UP (ref 3.3–5)
ALP SERPL-CCNC: 72 U/L — SIGNIFICANT CHANGE UP (ref 40–120)
ALT FLD-CCNC: 27 U/L — SIGNIFICANT CHANGE UP (ref 12–78)
ANION GAP SERPL CALC-SCNC: 6 MMOL/L — SIGNIFICANT CHANGE UP (ref 5–17)
APPEARANCE UR: CLEAR — SIGNIFICANT CHANGE UP
APTT BLD: 36.1 SEC — SIGNIFICANT CHANGE UP (ref 27.5–37.4)
AST SERPL-CCNC: 20 U/L — SIGNIFICANT CHANGE UP (ref 15–37)
BASOPHILS # BLD AUTO: 0.02 K/UL — SIGNIFICANT CHANGE UP (ref 0–0.2)
BASOPHILS NFR BLD AUTO: 0.2 % — SIGNIFICANT CHANGE UP (ref 0–2)
BILIRUB SERPL-MCNC: 0.4 MG/DL — SIGNIFICANT CHANGE UP (ref 0.2–1.2)
BILIRUB UR-MCNC: NEGATIVE — SIGNIFICANT CHANGE UP
BLD GP AB SCN SERPL QL: SIGNIFICANT CHANGE UP
BUN SERPL-MCNC: 19 MG/DL — SIGNIFICANT CHANGE UP (ref 7–23)
CALCIUM SERPL-MCNC: 8.9 MG/DL — SIGNIFICANT CHANGE UP (ref 8.5–10.1)
CHLORIDE SERPL-SCNC: 106 MMOL/L — SIGNIFICANT CHANGE UP (ref 96–108)
CO2 SERPL-SCNC: 28 MMOL/L — SIGNIFICANT CHANGE UP (ref 22–31)
COLOR SPEC: YELLOW — SIGNIFICANT CHANGE UP
CREAT SERPL-MCNC: 0.76 MG/DL — SIGNIFICANT CHANGE UP (ref 0.5–1.3)
DACRYOCYTES BLD QL SMEAR: SLIGHT — SIGNIFICANT CHANGE UP
DIFF PNL FLD: NEGATIVE — SIGNIFICANT CHANGE UP
ELLIPTOCYTES BLD QL SMEAR: SIGNIFICANT CHANGE UP
EOSINOPHIL # BLD AUTO: 0.03 K/UL — SIGNIFICANT CHANGE UP (ref 0–0.5)
EOSINOPHIL NFR BLD AUTO: 0.3 % — SIGNIFICANT CHANGE UP (ref 0–6)
GLUCOSE SERPL-MCNC: 95 MG/DL — SIGNIFICANT CHANGE UP (ref 70–99)
GLUCOSE UR QL: NEGATIVE MG/DL — SIGNIFICANT CHANGE UP
HCT VFR BLD CALC: 39.8 % — SIGNIFICANT CHANGE UP (ref 34.5–45)
HGB BLD-MCNC: 11.9 G/DL — SIGNIFICANT CHANGE UP (ref 11.5–15.5)
HYPOCHROMIA BLD QL: SIGNIFICANT CHANGE UP
IMM GRANULOCYTES NFR BLD AUTO: 0.3 % — SIGNIFICANT CHANGE UP (ref 0–1.5)
INR BLD: 2.75 RATIO — HIGH (ref 0.88–1.16)
KETONES UR-MCNC: NEGATIVE — SIGNIFICANT CHANGE UP
LEUKOCYTE ESTERASE UR-ACNC: NEGATIVE — SIGNIFICANT CHANGE UP
LYMPHOCYTES # BLD AUTO: 2.24 K/UL — SIGNIFICANT CHANGE UP (ref 1–3.3)
LYMPHOCYTES # BLD AUTO: 25.4 % — SIGNIFICANT CHANGE UP (ref 13–44)
MANUAL SMEAR VERIFICATION: SIGNIFICANT CHANGE UP
MCHC RBC-ENTMCNC: 19.7 PG — LOW (ref 27–34)
MCHC RBC-ENTMCNC: 29.9 GM/DL — LOW (ref 32–36)
MCV RBC AUTO: 65.9 FL — LOW (ref 80–100)
MICROCYTES BLD QL: SLIGHT — SIGNIFICANT CHANGE UP
MONOCYTES # BLD AUTO: 0.49 K/UL — SIGNIFICANT CHANGE UP (ref 0–0.9)
MONOCYTES NFR BLD AUTO: 5.6 % — SIGNIFICANT CHANGE UP (ref 2–14)
NEUTROPHILS # BLD AUTO: 6.01 K/UL — SIGNIFICANT CHANGE UP (ref 1.8–7.4)
NEUTROPHILS NFR BLD AUTO: 68.2 % — SIGNIFICANT CHANGE UP (ref 43–77)
NITRITE UR-MCNC: NEGATIVE — SIGNIFICANT CHANGE UP
NRBC # BLD: 0 /100 WBCS — SIGNIFICANT CHANGE UP (ref 0–0)
OVALOCYTES BLD QL SMEAR: SLIGHT — SIGNIFICANT CHANGE UP
PH UR: 5 — SIGNIFICANT CHANGE UP (ref 5–8)
PLAT MORPH BLD: NORMAL — SIGNIFICANT CHANGE UP
PLATELET # BLD AUTO: 223 K/UL — SIGNIFICANT CHANGE UP (ref 150–400)
POTASSIUM SERPL-MCNC: 4 MMOL/L — SIGNIFICANT CHANGE UP (ref 3.5–5.3)
POTASSIUM SERPL-SCNC: 4 MMOL/L — SIGNIFICANT CHANGE UP (ref 3.5–5.3)
PROT SERPL-MCNC: 7 GM/DL — SIGNIFICANT CHANGE UP (ref 6–8.3)
PROT UR-MCNC: NEGATIVE MG/DL — SIGNIFICANT CHANGE UP
PROTHROM AB SERPL-ACNC: 30.3 SEC — HIGH (ref 9.8–12.7)
RBC # BLD: 6.04 M/UL — HIGH (ref 3.8–5.2)
RBC # FLD: 16.2 % — HIGH (ref 10.3–14.5)
RBC BLD AUTO: (no result)
SODIUM SERPL-SCNC: 140 MMOL/L — SIGNIFICANT CHANGE UP (ref 135–145)
SP GR SPEC: 1.01 — SIGNIFICANT CHANGE UP (ref 1.01–1.02)
TROPONIN I SERPL-MCNC: <0.015 NG/ML — SIGNIFICANT CHANGE UP (ref 0.01–0.04)
TYPE + AB SCN PNL BLD: SIGNIFICANT CHANGE UP
UROBILINOGEN FLD QL: NEGATIVE MG/DL — SIGNIFICANT CHANGE UP
WBC # BLD: 8.82 K/UL — SIGNIFICANT CHANGE UP (ref 3.8–10.5)
WBC # FLD AUTO: 8.82 K/UL — SIGNIFICANT CHANGE UP (ref 3.8–10.5)

## 2018-04-10 PROCEDURE — 99285 EMERGENCY DEPT VISIT HI MDM: CPT

## 2018-04-10 PROCEDURE — 70450 CT HEAD/BRAIN W/O DYE: CPT | Mod: 26,59

## 2018-04-10 PROCEDURE — 93010 ELECTROCARDIOGRAM REPORT: CPT

## 2018-04-10 PROCEDURE — 70496 CT ANGIOGRAPHY HEAD: CPT | Mod: 26

## 2018-04-10 PROCEDURE — 99292 CRITICAL CARE ADDL 30 MIN: CPT

## 2018-04-10 PROCEDURE — 71045 X-RAY EXAM CHEST 1 VIEW: CPT | Mod: 26

## 2018-04-10 PROCEDURE — 99223 1ST HOSP IP/OBS HIGH 75: CPT

## 2018-04-10 RX ORDER — ATORVASTATIN CALCIUM 80 MG/1
80 TABLET, FILM COATED ORAL AT BEDTIME
Qty: 0 | Refills: 0 | Status: DISCONTINUED | OUTPATIENT
Start: 2018-04-10 | End: 2018-04-12

## 2018-04-10 RX ORDER — WARFARIN SODIUM 2.5 MG/1
2 TABLET ORAL DAILY
Qty: 0 | Refills: 0 | Status: DISCONTINUED | OUTPATIENT
Start: 2018-04-10 | End: 2018-04-12

## 2018-04-10 RX ORDER — PANTOPRAZOLE SODIUM 20 MG/1
40 TABLET, DELAYED RELEASE ORAL
Qty: 0 | Refills: 0 | Status: DISCONTINUED | OUTPATIENT
Start: 2018-04-10 | End: 2018-04-12

## 2018-04-10 RX ORDER — AMLODIPINE BESYLATE 2.5 MG/1
5 TABLET ORAL DAILY
Qty: 0 | Refills: 0 | Status: DISCONTINUED | OUTPATIENT
Start: 2018-04-10 | End: 2018-04-12

## 2018-04-10 RX ORDER — ASPIRIN/CALCIUM CARB/MAGNESIUM 324 MG
325 TABLET ORAL ONCE
Qty: 0 | Refills: 0 | Status: COMPLETED | OUTPATIENT
Start: 2018-04-10 | End: 2018-04-10

## 2018-04-10 RX ADMIN — Medication 4 MILLIGRAM(S): at 22:48

## 2018-04-10 RX ADMIN — Medication 325 MILLIGRAM(S): at 21:00

## 2018-04-10 RX ADMIN — ATORVASTATIN CALCIUM 80 MILLIGRAM(S): 80 TABLET, FILM COATED ORAL at 22:21

## 2018-04-10 RX ADMIN — WARFARIN SODIUM 2 MILLIGRAM(S): 2.5 TABLET ORAL at 22:21

## 2018-04-10 NOTE — ED ADULT NURSE NOTE - OBJECTIVE STATEMENT
72 y/o F c/o right sided weakness and arm heaviness starting 30 mins pta. Pt has been dizzy for approximately 3 days. Pt has a hx of having a cva in march of this year. As per pt's daughter, pt usually walks with walker.

## 2018-04-10 NOTE — ED PROVIDER NOTE - NEUROLOGICAL, MLM
+4.5/5 strength in RUE. 4/5 strength in RLE. Cranial nerves 2-12 intact. Sensation equal in all 4 extremities. No signs of trauma noted.

## 2018-04-10 NOTE — ED PROVIDER NOTE - OBJECTIVE STATEMENT
Code Stroke. 74 y/o F with PMHx of CVA on 3-, HTN and DVT on Coumadin BIB EMS to the ED c/o R sided weakness and heaviness starting about half an hour ago. Pt's daughter states that Pt has been c/o dizziness for the past 3 days. Pt was seen multiple times for intermittent R sided arm weakness. Pt had negative MRI and CT. Pt was admitted to [facility] in 2-2018 with CVA with acute left basal gangliar infarct after presenting with R sided weakness and difficulty speaking. Pt was then admitted to rehab and was d/c from rehab on 3-. Pt states that she had full function of RUE and RLE.

## 2018-04-10 NOTE — H&P ADULT - NSHPPHYSICALEXAM_GEN_ALL_CORE
· EYES: Clear bilaterally, pupils equal, round and reactive to light.  · CARDIAC: Normal rate, regular rhythm.  Heart sounds S1, S2.  No murmurs, rubs or gallops.  · RESPIRATORY: Breath sounds clear and equal bilaterally.  · GASTROINTESTINAL: Abdomen soft, non-tender, no guarding.  · MUSCULOSKELETAL: Spine appears normal, range of motion is not limited, no muscle or joint tenderness  · NEUROLOGICAL: +4.5/5 strength in RUE. 4/5 strength in RLE. Cranial nerves 2-12 intact. Sensation equal in all 4 extremities. No signs of trauma noted.  · SKIN: Skin normal color for race, warm, dry and intact. No evidence of rash. · EYES: Clear bilaterally, pupils equal, round and reactive to light.  · CARDIAC: Normal rate, regular rhythm.  Heart sounds S1, S2.  No murmurs, rubs or gallops.  · RESPIRATORY: Breath sounds clear and equal bilaterally.  · GASTROINTESTINAL: Abdomen soft, non-tender, no guarding.  · MUSCULOSKELETAL: Spine appears normal, range of motion is not limited, no muscle or joint tenderness  · NEUROLOGICAL: +4.5/5 strength in RUE. 4/5 strength in RLE. Cranial nerves 2-12 intact. Sensation equal in all 4 extremities. No signs of trauma noted. Her mot str was 5/5 before  · SKIN: Skin normal color for race, warm, dry and intact. No evidence of rash.

## 2018-04-10 NOTE — H&P ADULT - HISTORY OF PRESENT ILLNESS
72 y/o F with PMHx of CVA on 3-, HTN and DVT on Coumadin BIB EMS to the ED c/o R sided weakness and heaviness starting about half an hour ago. Pt's daughter states that Pt has been c/o dizziness for the past 3 days. Pt was seen multiple times for intermittent R sided arm weakness. Pt had negative MRI and CT. Pt was admitted to [facility] in 2-2018 with CVA with acute left basal gangliar infarct after presenting with R sided weakness and difficulty speaking. Pt was then admitted to rehab and was d/c from rehab on 3-. Pt states that she had full function of RUE and RLE. Right sided weakness unable to walk therapeutic INR. Fam at bedside translating per pt's request 74 y/o F with PMHx of CVA on 3-, HTN and DVT on Coumadin BIB EMS to the ED c/o R sided weakness and heaviness starting about half an hour ago. Pt's daughter states that Pt has been c/o dizziness for the past 3 days. Pt was seen multiple times for intermittent R sided arm weakness. Pt had negative MRI and CT. Pt was admitted to [facility] in 2-2018 with CVA with acute left basal gangliar infarct after presenting with R sided weakness and difficulty speaking. Pt was then admitted to rehab and was d/c from rehab on 3-. Pt states that she had full function of RUE and RLE. Right sided weakness unable to walk therapeutic INR. Fam at bedside translating per pt's request; pt feels dizzy when she wakes up. Pt was d/c after CVA on VKA no ASA. I suspect BP might be low in am she is on 2 antiHTN; plan to MRI; she needs ASA

## 2018-04-10 NOTE — CONSULT NOTE ADULT - ASSESSMENT
73 year old woman hx of recent left subcortical CVA, since this AM has been unable to walk, right side is weak. On exam has right hemiparesis, initial Ct of head shows no acute changes. left BG/CR infarct.  ? new CVA, ?embolic. Not TPA candidate, symptoms greater than 4 hours.

## 2018-04-10 NOTE — CHART NOTE - NSCHARTNOTEFT_GEN_A_CORE
I was called to te pt as she was c/o lt leg pain for 2 days, swelling  pt seen and examined  LLE- calf tenderness, +Homen sign, +2 edema    A/P  Less likely DVT on this pt that is treated with VKA and has therpautrci INR  Will check doppler to r/o

## 2018-04-10 NOTE — ED PROVIDER NOTE - PROGRESS NOTE DETAILS
Linda Dorsey on behalf of Attending Dr. Duarte. Dr. Wagner, neuro in ED to see Pt. Linda Dorsey on behalf of Attending Dr. Duarte. Dr. Wagner, neuro at bedside for stroke eval. Sunita Flores: Jeetibarabella Statement (Attending): I Hedy Flores attest that this documentation has been prepared under the direction and in the presence of Doctor Duarte. VANESSAM: Pt seen by Dr. Nagy of neuro who notes upon further history symptoms actually began this morning which put patient out of the window for tpa. will obtain cta to r/o large vessel occlusion which would necessitate transfer to [facility]. if no occlusion will admit to [facility] for obs and mri.

## 2018-04-10 NOTE — H&P ADULT - ASSESSMENT
RIGHT SIDE WEAKNESS   H/O CVA  H/O DVT  HTN    - MRI  - tele monitor  - resume antiHTN  - therapeutic INR RIGHT SIDE WEAKNESS   H/O CVA  H/O DVT  HTN    - MRI  - tele monitor  - r/o orthostasis as an explanation for her early am symptoms  - therapeutic INR   - add ASA; pt was d/c on vka only with a lacunar infarct

## 2018-04-10 NOTE — H&P ADULT - NSHPLABSRESULTS_GEN_ALL_CORE
11.9   8.82  )-----------( 223      ( 10 Apr 2018 16:27 )             39.8   04-10    140  |  106  |  19  ----------------------------<  95  4.0   |  28  |  0.76    Ca    8.9      10 Apr 2018 16:27    TPro  7.0  /  Alb  3.3  /  TBili  0.4  /  DBili  x   /  AST  20  /  ALT  27  /  AlkPhos  72  04-10

## 2018-04-10 NOTE — CONSULT NOTE ADULT - SUBJECTIVE AND OBJECTIVE BOX
Neurology Consult requested by:   Patient is a 73y old  Female who presents with a chief complaint of weakness right side since this morning   HPI:  73 year old woman hx of SLE on warfarin, compliant,  recent left subcortical CVA with resolved right sided weakness. Since awakening this AM has been c/o worsening right arm and leg weakness. No associated pain, headache, slurred speech, CP, palpitations. has been c/o of 3 days of postural induced transient vertigo.    PAST MEDICAL & SURGICAL HISTORY:  DVT (deep venous thrombosis)  HTN (hypertension)  S/P hysterectomy    FAMILY HISTORY:  No pertinent family history in first degree relatives    Social Hx:  Nonsmoker, no drug or alcohol use  Medications and Allergies ReviewedMEDICATIONS  (STANDING):     ROS: Pertinent positives in HPI, all other ROS were reviewed and are negative.      Examination:   Vital Signs Last 24 Hrs  T(C): 36.9 (10 Apr 2018 15:39), Max: 36.9 (10 Apr 2018 15:39)  T(F): 98.4 (10 Apr 2018 15:39), Max: 98.4 (10 Apr 2018 15:39)  HR: 65 (10 Apr 2018 15:39) (65 - 65)  BP: 124/55 (10 Apr 2018 15:39) (124/55 - 124/55)  BP(mean): --  RR: 18 (10 Apr 2018 15:39) (18 - 18)  SpO2: 100% (10 Apr 2018 15:39) (100% - 100%)  General: Cooperative, NAD   NECK: supple, no masses  ENT: Normal hearing   Vascular : no carotid bruits,   Lungs: CTAB  Chest: RRR, no murmurs  Extremities: nontender, no edema  Musculoskeletal: no adventitious movements, no joint stiffness  Skin: no rash    Neurological Examination:  NIHSS:  MS: AOx3. Appropriately interactive, normal affect. Speech fluent w/o paraphasic error, repetition, naming, reading is intact   CN: VFFTC, PERLL, EOMI, V1-3 sensation intact, face symmetric, hearing intact, palate elevates symmetrically, tongue midline, SCM equal bilaterally  Motor: normal bulk and tone, no tremor, rigidity or bradykinesia.  + right pronator drift, strength RUE~4/5, RLE~4/5    Sens: Intact to light touch.    Reflexes: 2/4 all over, downgoing toes on the left upgoing on the right  Coord:  No dysmetria, MAINE intact   Gait: Cannot test    Labs:   POCT Blood Glucose.: 96 mg/dL (04.10.18 @ 15:58)    Reviewed  Imaging:   < from: CT Brain Stroke Protocol (04.10.18 @ 15:50) >  INTERPRETATION:      CT head without IV contrast        CLINICAL INFORMATION:  RIGHT-sided weakness          TECHNIQUE: Contiguous axial 5 mm sections were obtained through the head.   Sagittal and coronal 2-D reformatted images were also obtained.   This   scan was performed using automatic exposure control (radiation dose   reduction software) to obtain a diagnostic image quality scan with   patient dose as low as reasonably achievable.     FINDINGS:   CT dated 2/23/2018 available for review.  The brain demonstrates old white matter infarction in the LEFT corona   radiata.   No acute cerebral cortical infarct is seen.  No intracranial   hemorrhage is found.  No mass effect is found in the brain.    The ventricles, sulci and basal cisterns appear unremarkable.      The orbits are unremarkable.  The paranasal sinuses are clear.  The nasal   cavity appears intact.  The nasopharynx is symmetric.  The central skull   base, petrous temporal bones and calvarium remain intact.  IMPRESSION:   Old white matter infarction in the LEFT corona radiata.              PROCEDURE DATE:  02/17/2018      INTERPRETATION:  CT ANGIO BRAIN (W)AW IC, CT ANGIO NECK (W)AW IC    INDICATION: Right-sided weakness. Evaluate for stroke.  TECHNIQUE: Noncontrast head CT, followed by contrast enhanced CT   angiography of the neck and [place] of [place]. Post processed MIP and 3-D   reformatted images were created and reviewed.   90 mL of Omnipaque 350 contrast material was injected IV.  COMPARISON: Prior CT from earlier the same day.    FINDINGS:  CT Brain:  Brain: No hemorrhage. No mass effect or edema. No midline shift.  Ventricles: No evidence of hydrocephalus.    Bones and soft tissues: Calvarium is intact.  Sinuses and mastoids: Unremarkable.  CTA Head:  Internal carotid arteries: No flow-limiting stenosis or aneurysm.  Anterior cerebral arteries: No flow-limiting stenosis or aneurysm.   Anterior communicating artery complex is normal.  Middle cerebral arteries: No flow-limiting stenosis or aneurysm.  Posterior cerebral arteries: No flow-limiting stenosis or aneurysm.   Bilateral posterior communicating arteries are identified.  Vertebral arteries and basilar artery: No stenosis or aneurysm. The   vertebral arteries are codominant.  Major intracranial venous structures are patent allowing for the phase of   enhancement.  CTA Neck:  Carotid arteries: No flow limiting stenosis or dissection.   Vertebral arteries: No flow limiting stenosis or dissection.  Aorta: Normal three vessel arch.  Soft tissues: No lymphadenopathy. Unremarkable thyroid.  Lung apices: Clear.  Spine: No suspicious lesions.  IMPRESSION:  No acute intracranial pathology.  No vessel occlusion, aneurysm, or AVM in the brain.      Magnetic resonance imaging of the brain was carried out with transaxial   SPGR, FLAIR, fast spin echo T2 weighted images, axial gradient echo and   SWI series, diffusion weighted series and sagittal T1 weighted series on   a 1.5 Jacqueline magnet.  Comparison is made with the prior brain CT of 2/23/2018.  The fourth, third and lateral ventricles are normal in size and position.   There is no hemorrhage, mass or shift of the midline structures. Small   vessel white matter ischemic changes are noted. There is an oval-shaped   focus of diffusion restriction in the left basal ganglia and corona   radiata consistent with an acute infarct. There is no significant mass   effect. No hemorrhage is identified.  There has been previous bilateral lens replacement surgery.  IMPRESSION: Acute left basal ganglia and corona radiata infarct on   diffusion-weighted imaging. No hemorrhage. Small vessel white matter   ischemic changes.

## 2018-04-10 NOTE — ED CLERICAL - DIVISION
Person... Principal Discharge DX:	Elbow laceration, left, initial encounter  Secondary Diagnosis:	Fall from standing, initial encounter

## 2018-04-10 NOTE — ED PROVIDER NOTE - MEDICAL DECISION MAKING DETAILS
Pt presenting with R sided weakness similar to prior stroke. Concern for CVA. Code stroke called. Plan for stroke workup. Likely admission.

## 2018-04-10 NOTE — ED ADULT NURSE REASSESSMENT NOTE - NS ED NURSE REASSESS COMMENT FT1
Pt taken and returned from CT, Code Stroke called upon arrival. Will continue to monitor for safety and comfort. Family at the bedside.
Pt c/o left leg "vein pain", does not want pain medicine.  Dr. Cabello made aware and will see patient. Will continue to monitor.
Pt resting in bed - no complaints at this time - will continue to monitor. Pt given PM medications. Pt repositioned, given pillow.

## 2018-04-10 NOTE — CONSULT NOTE ADULT - PROBLEM SELECTOR RECOMMENDATION 9
consider Ct angio, r/o left MCA clot. if negative, then admit  pending labs, INR.  admit to monitor bed.  MRI brain wo mikala.  2 d echo, ? SHELLEY.

## 2018-04-11 DIAGNOSIS — R29.898 OTHER SYMPTOMS AND SIGNS INVOLVING THE MUSCULOSKELETAL SYSTEM: ICD-10-CM

## 2018-04-11 LAB
CHOLEST SERPL-MCNC: 125 MG/DL — SIGNIFICANT CHANGE UP (ref 10–199)
ESTIMATED AVERAGE GLUCOSE: 108 MG/DL — SIGNIFICANT CHANGE UP (ref 68–114)
HBA1C BLD-MCNC: 5.4 % — SIGNIFICANT CHANGE UP (ref 4–5.6)
HBA1C BLD-MCNC: 5.4 % — SIGNIFICANT CHANGE UP (ref 4–5.6)
HDLC SERPL-MCNC: 55 MG/DL — SIGNIFICANT CHANGE UP (ref 40–125)
LIPID PNL WITH DIRECT LDL SERPL: 61 MG/DL — SIGNIFICANT CHANGE UP
TOTAL CHOLESTEROL/HDL RATIO MEASUREMENT: 2.3 RATIO — LOW (ref 3.3–7.1)
TRIGL SERPL-MCNC: 43 MG/DL — SIGNIFICANT CHANGE UP (ref 10–149)

## 2018-04-11 PROCEDURE — 93971 EXTREMITY STUDY: CPT | Mod: 26,LT

## 2018-04-11 PROCEDURE — 70551 MRI BRAIN STEM W/O DYE: CPT | Mod: 26

## 2018-04-11 PROCEDURE — 99232 SBSQ HOSP IP/OBS MODERATE 35: CPT

## 2018-04-11 RX ADMIN — PANTOPRAZOLE SODIUM 40 MILLIGRAM(S): 20 TABLET, DELAYED RELEASE ORAL at 11:55

## 2018-04-11 RX ADMIN — Medication 1 MILLIGRAM(S): at 14:59

## 2018-04-11 RX ADMIN — Medication 4 MILLIGRAM(S): at 22:09

## 2018-04-11 RX ADMIN — AMLODIPINE BESYLATE 5 MILLIGRAM(S): 2.5 TABLET ORAL at 05:30

## 2018-04-11 RX ADMIN — Medication 5 MILLIGRAM(S): at 05:30

## 2018-04-11 RX ADMIN — ATORVASTATIN CALCIUM 80 MILLIGRAM(S): 80 TABLET, FILM COATED ORAL at 22:10

## 2018-04-11 RX ADMIN — WARFARIN SODIUM 2 MILLIGRAM(S): 2.5 TABLET ORAL at 22:07

## 2018-04-11 RX ADMIN — Medication 10 MILLIGRAM(S): at 05:30

## 2018-04-11 NOTE — PROGRESS NOTE ADULT - SUBJECTIVE AND OBJECTIVE BOX
Patient is a 73y old  Female who presents with a chief complaint of right hemiparesis (10 Apr 2018 18:50)      HPI:  72 y/o F with PMHx of CVA on 3-, HTN and DVT on Coumadin BIB EMS to the ED c/o R sided weakness and heaviness starting about half an hour ago. Pt's daughter states that Pt has been c/o dizziness for the past 3 days. Pt was seen multiple times for intermittent R sided arm weakness. Pt had negative MRI and CT. Pt was admitted to [facility] in  with CVA with acute left basal gangliar infarct after presenting with R sided weakness and difficulty speaking. Pt was then admitted to rehab and was d/c from rehab on 3-. Pt states that she had full function of RUE and RLE. Right sided weakness unable to walk therapeutic INR. Fam at bedside translating per pt's request; pt feels dizzy when she wakes up. Pt was d/c after CVA on VKA no ASA. I suspect BP might be low in am she is on 2 antiHTN; plan to MRI; she needs ASA (10 Apr 2018 18:50)      : Pt with vague complaints, mostly weakness in lower extremities.  Conversation translated by daughter at bedside.  No focal deficits appreciated in history.  Pt awaiting MRI to rule out CVA.    Review of system- Rest of the review of system are normal except mentioned in HPI    Daily     Daily Weight in k.3 (2018 02:12)    Vital Signs Last 24 Hrs  T(C): 36.2 (2018 12:00), Max: 36.8 (2018 00:19)  T(F): 97.2 (2018 12:00), Max: 98.2 (2018 00:19)  HR: 79 (2018 15:56) (58 - 82)  BP: 126/65 (2018 15:56) (109/59 - 141/70)  BP(mean): 79 (2018 15:56) (61 - 84)  RR: 20 (2018 11:59) (15 - 20)  SpO2: 95% (2018 09:00) (95% - 100%)    PHYSICAL EXAM:    Constitutional: NAD, awake and alert, well-developed  HEENT: PERR, EOMI, Normal Hearing, MMM  Neck: Soft and supple  Respiratory: Breath sounds are clear bilaterally, No wheezing, rales or rhonchi  Cardiovascular: S1 and S2, regular rate and rhythm, no Murmurs, gallops or rubs  Gastrointestinal: Bowel Sounds present, soft, nontender, nondistended, no guarding, no rebound  Extremities: No peripheral edema  Neurological: A/O x 3, no focal deficits in my limited exam  Skin: No rashes        Allergies    penicillin (Rash)    Intolerances    MEDICATIONS  (STANDING):  amLODIPine   Tablet 5 milliGRAM(s) Oral daily  atorvastatin 80 milliGRAM(s) Oral at bedtime  enalapril 10 milliGRAM(s) Oral daily  pantoprazole    Tablet 40 milliGRAM(s) Oral before breakfast  predniSONE   Tablet 5 milliGRAM(s) Oral daily  predniSONE   Tablet 4 milliGRAM(s) Oral at bedtime  warfarin 2 milliGRAM(s) Oral daily    MEDICATIONS  (PRN):    CARDIAC MARKERS ( 10 Apr 2018 16:27 )  <0.015 ng/mL / x     / x     / x     / x                                11.9   8.82  )-----------( 223      ( 10 Apr 2018 16:27 )             39.8     04-10    140  |  106  |  19  ----------------------------<  95  4.0   |  28  |  0.76    Ca    8.9      10 Apr 2018 16:27    TPro  7.0  /  Alb  3.3  /  TBili  0.4  /  DBili  x   /  AST  20  /  ALT  27  /  AlkPhos  72  04-10    CAPILLARY BLOOD GLUCOSE        LIVER FUNCTIONS - ( 10 Apr 2018 16:27 )  Alb: 3.3 g/dL / Pro: 7.0 gm/dL / ALK PHOS: 72 U/L / ALT: 27 U/L / AST: 20 U/L / GGT: x           PT/INR - ( 10 Apr 2018 16:27 )   PT: 30.3 sec;   INR: 2.75 ratio         PTT - ( 10 Apr 2018 16:27 )  PTT:36.1 sec  Urinalysis Basic - ( 10 Apr 2018 16:54 )    Color: Yellow / Appearance: Clear / S.010 / pH: x  Gluc: x / Ketone: Negative  / Bili: Negative / Urobili: Negative mg/dL   Blood: x / Protein: Negative mg/dL / Nitrite: Negative   Leuk Esterase: Negative / RBC: x / WBC x   Sq Epi: x / Non Sq Epi: x / Bacteria: x      Assessment and Plan:  72 y/o F with PMHx of CVA on 3-, HTN and DVT on Coumadin BIB EMS to the ED c/o R sided weakness and heaviness.    RIGHT SIDE WEAKNESS/LE weakness w/ Hx of CVA:   -monitor on tele  -CT head --> old infarct  -CTA head shows no acute pathology  -f/u MRI  -neuro eval appreciated  -on coumadin for anticoagulation   -c/w high dose statin - f/u AM CPK as lower extremity weakness could be a side effect.   -PT eval --> home PT    H/O DVT  -continue coumadin for goal INR 2-3    HTN: Labile  -monitor BP - if consistently low would taper norvasc.    DVT ppx:  -coumadin    Attending Statement:  40 minutes spent on total encounter; more than 50% of the visit was spent counseling and/or coordinating care by the attending physician.

## 2018-04-11 NOTE — PHYSICAL THERAPY INITIAL EVALUATION ADULT - ADDITIONAL COMMENTS
Pt. resides in 2 level house w/ son. Pt. ambulates w/ RW at baseline and was receiving home PT and OT PTA.

## 2018-04-11 NOTE — PROGRESS NOTE ADULT - ASSESSMENT
73 year old woman with recent left BG infarct, c/o increasing right sided weakness. initial Ct,Ct angio showed no acute changes. ? new left side CVA

## 2018-04-11 NOTE — PROGRESS NOTE ADULT - SUBJECTIVE AND OBJECTIVE BOX
73 year old woman hx of SLE on warfarin, compliant,  recent left subcortical CVA with resolved right sided weakness. Since awakening this AM has been c/o worsening right arm and leg weakness. No associated pain, headache, slurred speech, CP, palpitations. She has been c/o of 3 days of postural induced transient vertigo.    MEDICATIONS  (STANDING):  amLODIPine   Tablet 5 milliGRAM(s) Oral daily  atorvastatin 80 milliGRAM(s) Oral at bedtime  enalapril 10 milliGRAM(s) Oral daily  pantoprazole    Tablet 40 milliGRAM(s) Oral before breakfast  predniSONE   Tablet 5 milliGRAM(s) Oral daily  predniSONE   Tablet 4 milliGRAM(s) Oral at bedtime  warfarin 2 milliGRAM(s) Oral daily    Neurological Exam:  HF: Patient is alert and oriented x 3. Follows complex commands.   CN: Vision is intact to confrontation. Pupils are equal and reactive. Extra ocular muscles are intact. There is right facial asymmetry. Tongue is midline. Sensation is intact in the face. Other CN II-XII are intact.   Motor: motor examination on the right  4+/5, 5/5 on the left.  Sensory: intact to pinprick and touch.   DTR: 2/4 all 4 extremities. Babinski is negative bilateral.  Co-ord:  Finger to finger to nose on the left is intact.   Gait/balance: nOt tested    Radiology report:  CT Head:  < from: CT Angio Head w/ IV Cont (04.10.18 @ 18:29) >  NTERPRETATION:  CT angiogram head and neck with contrast    History right-sided weakness    Contrast Omnipaque 90 cc; 10 cc discarded    Multiplanar MIPS included    Examination of the intracranial circulation is negative for aneurysm or   stenosis or occlusion. There is normal venous sinus enhancement.    Examination of the cervical circulation is negative for carotid or   vertebral artery stenosis,occlusion or dissection on either side. The   left vertebral artery is slightly dominant.    IMPRESSION:    Findings within normal limits    < from: CT Brain Stroke Protocol (04.10.18 @ 15:50) >    FINDINGS:   CT dated 2/23/2018 available for review.    The brain demonstrates old white matter infarction in the LEFT corona   radiata.   No acute cerebral cortical infarct is seen.  No intracranial   hemorrhage is found.  No mass effect is found in the brain.      The ventricles, sulci and basal cisterns appear unremarkable.         The orbits are unremarkable.  The paranasal sinuses are clear.  The nasal   cavity appears intact.  The nasopharynx is symmetric.  The central skull   base, petrous temporal bones and calvarium remain intact.      IMPRESSION:   Old white matter infarction in the LEFT corona radiata.    < end of copied text >

## 2018-04-11 NOTE — PHYSICAL THERAPY INITIAL EVALUATION ADULT - PERTINENT HX OF CURRENT PROBLEM, REHAB EVAL
Pt. was BIB EMS w/ c/o R sided weakness. Pt.'s daughter reports pt. was c/o dizziness for past 3 days. Pt. was admitted to [facility] in 2/2018 w/ acute basal ganglia infarct. Pt. then went to rehab and was discharged 3/26. LE: (-) DVT

## 2018-04-12 ENCOUNTER — TRANSCRIPTION ENCOUNTER (OUTPATIENT)
Age: 73
End: 2018-04-12

## 2018-04-12 VITALS
HEART RATE: 76 BPM | RESPIRATION RATE: 16 BRPM | SYSTOLIC BLOOD PRESSURE: 150 MMHG | TEMPERATURE: 98 F | OXYGEN SATURATION: 98 % | DIASTOLIC BLOOD PRESSURE: 68 MMHG

## 2018-04-12 LAB
ANION GAP SERPL CALC-SCNC: 8 MMOL/L — SIGNIFICANT CHANGE UP (ref 5–17)
BUN SERPL-MCNC: 20 MG/DL — SIGNIFICANT CHANGE UP (ref 7–23)
CALCIUM SERPL-MCNC: 8.3 MG/DL — LOW (ref 8.5–10.1)
CHLORIDE SERPL-SCNC: 108 MMOL/L — SIGNIFICANT CHANGE UP (ref 96–108)
CK SERPL-CCNC: 69 U/L — SIGNIFICANT CHANGE UP (ref 26–192)
CO2 SERPL-SCNC: 26 MMOL/L — SIGNIFICANT CHANGE UP (ref 22–31)
CREAT SERPL-MCNC: 0.64 MG/DL — SIGNIFICANT CHANGE UP (ref 0.5–1.3)
GLUCOSE SERPL-MCNC: 118 MG/DL — HIGH (ref 70–99)
HCT VFR BLD CALC: 36 % — SIGNIFICANT CHANGE UP (ref 34.5–45)
HGB BLD-MCNC: 10.9 G/DL — LOW (ref 11.5–15.5)
INR BLD: 2.96 RATIO — HIGH (ref 0.88–1.16)
MCHC RBC-ENTMCNC: 19.7 PG — LOW (ref 27–34)
MCHC RBC-ENTMCNC: 30.3 GM/DL — LOW (ref 32–36)
MCV RBC AUTO: 65.2 FL — LOW (ref 80–100)
NRBC # BLD: 0 /100 WBCS — SIGNIFICANT CHANGE UP (ref 0–0)
PLATELET # BLD AUTO: 209 K/UL — SIGNIFICANT CHANGE UP (ref 150–400)
POTASSIUM SERPL-MCNC: 4.1 MMOL/L — SIGNIFICANT CHANGE UP (ref 3.5–5.3)
POTASSIUM SERPL-SCNC: 4.1 MMOL/L — SIGNIFICANT CHANGE UP (ref 3.5–5.3)
PROTHROM AB SERPL-ACNC: 32.7 SEC — HIGH (ref 9.8–12.7)
RBC # BLD: 5.52 M/UL — HIGH (ref 3.8–5.2)
RBC # FLD: 15.8 % — HIGH (ref 10.3–14.5)
SODIUM SERPL-SCNC: 142 MMOL/L — SIGNIFICANT CHANGE UP (ref 135–145)
TSH SERPL-MCNC: 0.76 UIU/ML — SIGNIFICANT CHANGE UP (ref 0.36–3.74)
WBC # BLD: 6.14 K/UL — SIGNIFICANT CHANGE UP (ref 3.8–10.5)
WBC # FLD AUTO: 6.14 K/UL — SIGNIFICANT CHANGE UP (ref 3.8–10.5)

## 2018-04-12 RX ORDER — ALPRAZOLAM 0.25 MG
1 TABLET ORAL
Qty: 28 | Refills: 1
Start: 2018-04-12 | End: 2018-05-09

## 2018-04-12 RX ORDER — ASPIRIN/CALCIUM CARB/MAGNESIUM 324 MG
1 TABLET ORAL
Qty: 30 | Refills: 3 | OUTPATIENT
Start: 2018-04-12 | End: 2018-08-09

## 2018-04-12 RX ADMIN — Medication 10 MILLIGRAM(S): at 06:42

## 2018-04-12 RX ADMIN — Medication 5 MILLIGRAM(S): at 06:42

## 2018-04-12 RX ADMIN — PANTOPRAZOLE SODIUM 40 MILLIGRAM(S): 20 TABLET, DELAYED RELEASE ORAL at 10:11

## 2018-04-12 RX ADMIN — AMLODIPINE BESYLATE 5 MILLIGRAM(S): 2.5 TABLET ORAL at 06:42

## 2018-04-12 NOTE — DISCHARGE NOTE ADULT - HOSPITAL COURSE
Vital Signs Last 24 Hrs  T(C): 35.9 (2018 05:56), Max: 36.4 (2018 20:58)  T(F): 96.6 (2018 05:56), Max: 97.5 (2018 20:58)  HR: 78 (2018 11:04) (58 - 82)  BP: 130/49 (2018 11:04) (100/43 - 141/70)  BP(mean): 67 (2018 11:04) (53 - 84)  RR: 15 (2018 06:00) (14 - 16)  SpO2: 96% (2018 06:00) (95% - 96%)    HEENT:   pupils equal and reactive, EOMI, no oropharyngeal lesions, erythema, exudates, oral thrush    NECK:   supple, no carotid bruits, no palpable lymph nodes, no thyromegaly    CV:  +S1, +S2, regular, no murmurs or rubs    RESP:   lungs clear to auscultation bilaterally, no wheezing, rales, rhonchi, good air entry bilaterally    BREAST:  not examined    GI:  abdomen soft, non-tender, non-distended, normal BS, no bruits, no abdominal masses, no palpable masses    RECTAL:  not examined    :  not examined    MSK:   normal muscle tone, no atrophy, no rigidity, no contractions    EXT:   no clubbing, no cyanosis, no edema, no calf pain, swelling or erythema    VASCULAR:  pulses equal and symmetric in the upper and lower extremities    NEURO:  AAOX3, no focal neurological deficits, follows all commands, able to move extremities spontaneously    SKIN:  no ulcers, lesions or rashes    Urinalysis Basic - ( 10 Apr 2018 16:54 )    Color: Yellow / Appearance: Clear / S.010 / pH: x  Gluc: x / Ketone: Negative  / Bili: Negative / Urobili: Negative mg/dL   Blood: x / Protein: Negative mg/dL / Nitrite: Negative   Leuk Esterase: Negative / RBC: x / WBC x   Sq Epi: x / Non Sq Epi: x / Bacteria: x    2018 04:51    142    |  108    |  20     ----------------------------<  118    4.1     |  26     |  0.64     Ca    8.3        2018 04:51    TPro  7.0    /  Alb  3.3    /  TBili  0.4    /  DBili  x      /  AST  20     /  ALT  27     /  AlkPhos  72     10 Apr 2018 16:27  LIVER FUNCTIONS - ( 10 Apr 2018 16:27 )  Alb: 3.3 g/dL / Pro: 7.0 gm/dL / ALK PHOS: 72 U/L / ALT: 27 U/L / AST: 20 U/L / GGT: x         PT/INR - ( 2018 04:51 )   PT: 32.7 sec;   INR: 2.96 ratio         PTT - ( 10 Apr 2018 16:27 )  PTT:36.1 secCBC Full  -  ( 2018 04:51 )  WBC Count : 6.14 K/uL  Hemoglobin : 10.9 g/dL  Hematocrit : 36.0 %  Platelet Count - Automated : 209 K/uL  Mean Cell Volume : 65.2 fl  Mean Cell Hemoglobin : 19.7 pg  Mean Cell Hemoglobin Concentration : 30.3 gm/dL    Hospital Course:      74 y/o F with PMHx of CVA on 3-, HTN and DVT on Coumadin BIB EMS to the ED c/o R sided weakness and heaviness.  CT head negative for pathology. MRI done which showed no acute CVA. Showed chronic lacunar infarcts.  Patient has hx of CVA and has not been on any anti-platlet therapy.  She states she eats well at home and does not add any salt to her foods. She admits to stress in her family that may be causing her blood pressure at home to be elevated.   Educated patient and family on importance of medication adherence, diet, and to stay away from social stressors.   Patient was c/o right ear buzzing sounds. Evaluated the inner ear with otoscope, no drainage, no evidence of infection. Recco to f/u outpatient with ENT for audiometry testing.

## 2018-04-12 NOTE — PROGRESS NOTE ADULT - SUBJECTIVE AND OBJECTIVE BOX
CHIEF COMPLAINT:    SUBJECTIVE:     REVIEW OF SYSTEMS:    CONSTITUTIONAL: No weakness, fevers or chills  EYES/ENT: No visual changes;  No vertigo or throat pain   NECK: No pain or stiffness  RESPIRATORY: No cough, wheezing, hemoptysis; No shortness of breath  CARDIOVASCULAR: No chest pain or palpitations  GASTROINTESTINAL: No abdominal or epigastric pain. No nausea, vomiting, or hematemesis; No diarrhea or constipation. No melena or hematochezia.  GENITOURINARY: No dysuria, frequency or hematuria  NEUROLOGICAL: No numbness or weakness  SKIN: No itching, burning, rashes, or lesions   All other review of systems is negative unless indicated above    Vital Signs Last 24 Hrs  T(C): 35.9 (12 Apr 2018 05:56), Max: 36.4 (11 Apr 2018 20:58)  T(F): 96.6 (12 Apr 2018 05:56), Max: 97.5 (11 Apr 2018 20:58)  HR: 58 (12 Apr 2018 06:00) (58 - 82)  BP: 100/43 (12 Apr 2018 06:00) (100/43 - 141/70)  BP(mean): 57 (12 Apr 2018 06:00) (53 - 84)  RR: 15 (12 Apr 2018 06:00) (14 - 20)  SpO2: 96% (12 Apr 2018 06:00) (95% - 96%)    I&O's Summary      CAPILLARY BLOOD GLUCOSE          PHYSICAL EXAM:    Constitutional: NAD, awake and alert, well-developed  HEENT: PERR, EOMI, Normal Hearing, MMM  Neck: Soft and supple, No LAD, No JVD  Respiratory: Breath sounds are clear bilaterally, No wheezing, rales or rhonchi  Cardiovascular: S1 and S2, regular rate and rhythm, no Murmurs, gallops or rubs  Gastrointestinal: Bowel Sounds present, soft, nontender, nondistended, no guarding, no rebound  Extremities: No peripheral edema  Vascular: 2+ peripheral pulses  Neurological: A/O x 3, no focal deficits  Musculoskeletal: 5/5 strength b/l upper and lower extremities  Skin: No rashes    MEDICATIONS:  MEDICATIONS  (STANDING):  amLODIPine   Tablet 5 milliGRAM(s) Oral daily  atorvastatin 80 milliGRAM(s) Oral at bedtime  enalapril 10 milliGRAM(s) Oral daily  pantoprazole    Tablet 40 milliGRAM(s) Oral before breakfast  predniSONE   Tablet 5 milliGRAM(s) Oral daily  predniSONE   Tablet 4 milliGRAM(s) Oral at bedtime  warfarin 2 milliGRAM(s) Oral daily      LABS: All Labs Reviewed:                        10.9   6.14  )-----------( 209      ( 12 Apr 2018 04:51 )             36.0     04-12    142  |  108  |  20  ----------------------------<  118<H>  4.1   |  26  |  0.64    Ca    8.3<L>      12 Apr 2018 04:51    TPro  7.0  /  Alb  3.3  /  TBili  0.4  /  DBili  x   /  AST  20  /  ALT  27  /  AlkPhos  72  04-10    PT/INR - ( 12 Apr 2018 04:51 )   PT: 32.7 sec;   INR: 2.96 ratio         PTT - ( 10 Apr 2018 16:27 )  PTT:36.1 sec  CARDIAC MARKERS ( 12 Apr 2018 04:51 )  x     / x     / 69 U/L / x     / x      CARDIAC MARKERS ( 10 Apr 2018 16:27 )  <0.015 ng/mL / x     / x     / x     / x            Assessment and Plan:        72 y/o F with PMHx of CVA on 3-, HTN and DVT on Coumadin BIB EMS to the ED c/o R sided weakness and heaviness.    RIGHT SIDE WEAKNESS/LE weakness w/ Hx of CVA:   -monitor on tele  -CT head --> old infarct  -CTA head shows no acute pathology  -f/u MRI  -neuro eval appreciated  -on coumadin for anticoagulation   -c/w high dose statin - f/u AM CPK as lower extremity weakness could be a side effect.   -PT eval --> home PT    H/O DVT  -continue coumadin for goal INR 2-3    HTN: Labile  -monitor BP - if consistently low would taper norvasc.    DVT ppx:  -coumadin

## 2018-04-12 NOTE — DISCHARGE NOTE ADULT - PLAN OF CARE
start aspirin 81mg daily; c/w heart healthy diet, low carbohydrate, low fat; c/w excercise f/u outpatient with your primary care doctor follow up outpatient with Ear doctor (phone number listed)

## 2018-04-12 NOTE — DISCHARGE NOTE ADULT - PATIENT PORTAL LINK FT
You can access the [facility], offered by [facility], by registering with the following website: [email or web address]

## 2018-04-12 NOTE — DISCHARGE NOTE ADULT - MEDICATION SUMMARY - MEDICATIONS TO TAKE
I will START or STAY ON the medications listed below when I get home from the hospital:    predniSONE 10 mg oral tablet  -- 1 tab(s) by mouth once a day  -- Indication: For home med    Tylenol 500 mg oral tablet  -- 1 tab(s) by mouth , As Needed  -- Indication: For pain    Aspir-Low 81 mg oral delayed release tablet  -- 1 tab(s) by mouth once a day   -- Swallow whole.  Do not crush.  Take with food or milk.    -- Indication: For History of Cerebrovascular accident (CVA), unspecified mechanism    enalapril 10 mg oral tablet  -- 1 tab(s) by mouth once a day  -- Indication: For htn    Coumadin 2 mg oral tablet  -- 1 tab(s) by mouth once a day  -- Indication: For DVT    gabapentin 100 mg oral capsule  -- 1 cap(s) by mouth 2 times a day  -- Indication: For pain    atorvastatin 40 mg oral tablet  -- 1 tab(s) by mouth once a day (at bedtime)  -- Indication: For hx of CVA    Xanax 0.25 mg oral tablet  -- 1 tab(s) by mouth 2 times a day MDD:2 tabs per day max  -- Avoid grapefruit and grapefruit juice while taking this medication.  Caution federal law prohibits the transfer of this drug to any person other  than the person for whom it was prescribed.  Do not take this drug if you are pregnant.  May cause drowsiness.  Alcohol may intensify this effect.  Use care when operating dangerous machinery.    -- Indication: For anxiety    amLODIPine 5 mg oral tablet  -- 1 tab(s) by mouth once a day  -- Indication: For htn    polyethylene glycol 3350 oral powder for reconstitution  -- 17 gram(s) by mouth once a day  -- Indication: For prophylaxis    pantoprazole 40 mg oral delayed release tablet  -- 1 tab(s) by mouth once a day  -- Indication: For gerd

## 2018-04-12 NOTE — DISCHARGE NOTE ADULT - CARE PROVIDER_API CALL
Oneal Wayne), Otolaryngology  [address] [place] [address]  Phone: [phone]  Fax: [phone]    Wander Davidson), Internal Medicine  [address] [place] [address]  Phone: [phone]  Fax: [phone]

## 2018-04-12 NOTE — DISCHARGE NOTE ADULT - CARE PLAN
Principal Discharge DX:	Transient cerebral ischemia, unspecified type  Goal:	start aspirin 81mg daily; c/w heart healthy diet, low carbohydrate, low fat; c/w excercise  Assessment and plan of treatment:	f/u outpatient with your primary care doctor  Secondary Diagnosis:	Vestibular disequilibrium, unspecified laterality  Goal:	follow up outpatient with Ear doctor (phone number listed)

## 2018-04-12 NOTE — DISCHARGE NOTE ADULT - NS AS DC STROKE ED MATERIALS
Stroke Warning Signs and Symptoms/Call 911 for Stroke/Need for Followup After Discharge/Risk Factors for Stroke/Prescribed Medications/Stroke Education Booklet

## 2018-04-13 DIAGNOSIS — Z86.718 PERSONAL HISTORY OF OTHER VENOUS THROMBOSIS AND EMBOLISM: ICD-10-CM

## 2018-04-13 DIAGNOSIS — I69.339 MONOPLEGIA OF UPPER LIMB FOLLOWING CEREBRAL INFARCTION AFFECTING UNSPECIFIED SIDE: ICD-10-CM

## 2018-04-13 DIAGNOSIS — G45.9 TRANSIENT CEREBRAL ISCHEMIC ATTACK, UNSPECIFIED: ICD-10-CM

## 2018-04-13 DIAGNOSIS — R53.1 WEAKNESS: ICD-10-CM

## 2018-04-13 DIAGNOSIS — I10 ESSENTIAL (PRIMARY) HYPERTENSION: ICD-10-CM

## 2018-04-13 DIAGNOSIS — Z79.899 OTHER LONG TERM (CURRENT) DRUG THERAPY: ICD-10-CM

## 2018-04-13 DIAGNOSIS — Z79.01 LONG TERM (CURRENT) USE OF ANTICOAGULANTS: ICD-10-CM

## 2018-05-02 ENCOUNTER — EMERGENCY (EMERGENCY)
Facility: HOSPITAL | Age: 73
LOS: 0 days | Discharge: HOME | End: 2018-05-02
Attending: EMERGENCY MEDICINE | Admitting: EMERGENCY MEDICINE
Payer: MEDICARE

## 2018-05-02 VITALS
RESPIRATION RATE: 18 BRPM | TEMPERATURE: 97 F | OXYGEN SATURATION: 100 % | DIASTOLIC BLOOD PRESSURE: 72 MMHG | HEART RATE: 65 BPM | SYSTOLIC BLOOD PRESSURE: 142 MMHG

## 2018-05-02 VITALS — WEIGHT: 199.96 LBS

## 2018-05-02 DIAGNOSIS — Z86.73 PERSONAL HISTORY OF TRANSIENT ISCHEMIC ATTACK (TIA), AND CEREBRAL INFARCTION WITHOUT RESIDUAL DEFICITS: ICD-10-CM

## 2018-05-02 DIAGNOSIS — Z90.710 ACQUIRED ABSENCE OF BOTH CERVIX AND UTERUS: ICD-10-CM

## 2018-05-02 DIAGNOSIS — I10 ESSENTIAL (PRIMARY) HYPERTENSION: ICD-10-CM

## 2018-05-02 DIAGNOSIS — M79.672 PAIN IN LEFT FOOT: ICD-10-CM

## 2018-05-02 DIAGNOSIS — Z79.01 LONG TERM (CURRENT) USE OF ANTICOAGULANTS: ICD-10-CM

## 2018-05-02 DIAGNOSIS — Z90.710 ACQUIRED ABSENCE OF BOTH CERVIX AND UTERUS: Chronic | ICD-10-CM

## 2018-05-02 DIAGNOSIS — Z79.899 OTHER LONG TERM (CURRENT) DRUG THERAPY: ICD-10-CM

## 2018-05-02 DIAGNOSIS — Z86.718 PERSONAL HISTORY OF OTHER VENOUS THROMBOSIS AND EMBOLISM: ICD-10-CM

## 2018-05-02 DIAGNOSIS — Z79.82 LONG TERM (CURRENT) USE OF ASPIRIN: ICD-10-CM

## 2018-05-02 LAB
APTT BLD: 36.5 SEC — SIGNIFICANT CHANGE UP (ref 27.5–37.4)
INR BLD: 2.33 RATIO — HIGH (ref 0.88–1.16)
PROTHROM AB SERPL-ACNC: 25.6 SEC — HIGH (ref 9.8–12.7)

## 2018-05-02 PROCEDURE — 99284 EMERGENCY DEPT VISIT MOD MDM: CPT

## 2018-05-02 PROCEDURE — 93971 EXTREMITY STUDY: CPT | Mod: 26,LT

## 2018-05-02 NOTE — ED STATDOCS - OBJECTIVE STATEMENT
72 y/o female with a PMHx of HTN, DVT (15 years ago) on Coumadin, 3 CVA's (2 months ago) presents to the ED c/o left foot pain xtoday. Pt reports left foot pain on the sole of her foot beginning yesterday. Pt notes similar Sx to previous time she had a DVT 15 years ago. Pt had INR checked by Dr. Davidson 1 month ago, and was advised to come back in a month, requesting INR checked.

## 2018-05-02 NOTE — ED ADULT TRIAGE NOTE - CHIEF COMPLAINT QUOTE
pt c/o left foot pain on sole of foot, since today, pt has hx of DVT with same symptoms presenting in past. pt takes 2mg of coumadin daily and had CVA two months ago.

## 2018-05-02 NOTE — ED ADULT NURSE NOTE - OBJECTIVE STATEMENT
74 y/o F c/o left foot pain, hx of DVT. Pt's son at the bedside translating for patient as per pt's request.

## 2018-05-02 NOTE — ED STATDOCS - ATTENDING CONTRIBUTION TO CARE
Attending Contribution to Care: I, Jennifer Michaud, performed the initial face to face bedside interview with this patient regarding history of present illness, review of symptoms and relevant past medical, social and family history.  I completed an independent physical examination.  I was the initial provider who evaluated this patient and the history, physical, and MDM reflect this intial assessment. I have signed out the follow up of any pending tests after the original (i.e. labs, radiological studies) to the ACP with instructions to review any with instructions to review any concerning findings to me prior to discharge.  I have communicated the patient’s plan of care and disposition with the ACP.

## 2018-05-02 NOTE — ED STATDOCS - MUSCULOSKELETAL, MLM
range of motion is not limited. +slight b/l LE edema, no calf tenderness. TTP of plantar aspect of mid left foot.

## 2018-05-02 NOTE — ED STATDOCS - PROGRESS NOTE DETAILS
73 yr. old female PMH: HTN, DVT 15 yrs. ago currently on Coumadin CVA 3 months ago presents to ED with left foot pain. Seen and examined by attending in Intake. Plan: INR and US. Will f/U with results and re evaluate. Wily CARBAJAL

## 2018-05-03 PROBLEM — I63.9 CEREBRAL INFARCTION, UNSPECIFIED: Chronic | Status: ACTIVE | Noted: 2018-04-10

## 2018-05-21 ENCOUNTER — EMERGENCY (EMERGENCY)
Facility: HOSPITAL | Age: 73
LOS: 0 days | Discharge: HOME | End: 2018-05-21
Attending: EMERGENCY MEDICINE | Admitting: EMERGENCY MEDICINE
Payer: MEDICARE

## 2018-05-21 VITALS
HEART RATE: 73 BPM | SYSTOLIC BLOOD PRESSURE: 115 MMHG | RESPIRATION RATE: 17 BRPM | TEMPERATURE: 98 F | DIASTOLIC BLOOD PRESSURE: 63 MMHG | OXYGEN SATURATION: 100 %

## 2018-05-21 VITALS
HEART RATE: 86 BPM | TEMPERATURE: 98 F | HEIGHT: 66 IN | WEIGHT: 210.1 LBS | SYSTOLIC BLOOD PRESSURE: 146 MMHG | RESPIRATION RATE: 18 BRPM | OXYGEN SATURATION: 95 % | DIASTOLIC BLOOD PRESSURE: 62 MMHG

## 2018-05-21 DIAGNOSIS — Z79.82 LONG TERM (CURRENT) USE OF ASPIRIN: ICD-10-CM

## 2018-05-21 DIAGNOSIS — Z86.718 PERSONAL HISTORY OF OTHER VENOUS THROMBOSIS AND EMBOLISM: ICD-10-CM

## 2018-05-21 DIAGNOSIS — Z90.710 ACQUIRED ABSENCE OF BOTH CERVIX AND UTERUS: Chronic | ICD-10-CM

## 2018-05-21 DIAGNOSIS — Z86.73 PERSONAL HISTORY OF TRANSIENT ISCHEMIC ATTACK (TIA), AND CEREBRAL INFARCTION WITHOUT RESIDUAL DEFICITS: ICD-10-CM

## 2018-05-21 DIAGNOSIS — R42 DIZZINESS AND GIDDINESS: ICD-10-CM

## 2018-05-21 DIAGNOSIS — Z79.01 LONG TERM (CURRENT) USE OF ANTICOAGULANTS: ICD-10-CM

## 2018-05-21 DIAGNOSIS — I10 ESSENTIAL (PRIMARY) HYPERTENSION: ICD-10-CM

## 2018-05-21 DIAGNOSIS — Z79.899 OTHER LONG TERM (CURRENT) DRUG THERAPY: ICD-10-CM

## 2018-05-21 LAB
ALBUMIN SERPL ELPH-MCNC: 3.7 G/DL — SIGNIFICANT CHANGE UP (ref 3.3–5)
ALP SERPL-CCNC: 78 U/L — SIGNIFICANT CHANGE UP (ref 40–120)
ALT FLD-CCNC: 21 U/L — SIGNIFICANT CHANGE UP (ref 12–78)
ANION GAP SERPL CALC-SCNC: 5 MMOL/L — SIGNIFICANT CHANGE UP (ref 5–17)
APTT BLD: 36.5 SEC — SIGNIFICANT CHANGE UP (ref 27.5–37.4)
AST SERPL-CCNC: 18 U/L — SIGNIFICANT CHANGE UP (ref 15–37)
BASOPHILS # BLD AUTO: 0.02 K/UL — SIGNIFICANT CHANGE UP (ref 0–0.2)
BASOPHILS NFR BLD AUTO: 0.3 % — SIGNIFICANT CHANGE UP (ref 0–2)
BILIRUB SERPL-MCNC: 0.4 MG/DL — SIGNIFICANT CHANGE UP (ref 0.2–1.2)
BUN SERPL-MCNC: 13 MG/DL — SIGNIFICANT CHANGE UP (ref 7–23)
CALCIUM SERPL-MCNC: 9.1 MG/DL — SIGNIFICANT CHANGE UP (ref 8.5–10.1)
CHLORIDE SERPL-SCNC: 110 MMOL/L — HIGH (ref 96–108)
CK SERPL-CCNC: 64 U/L — SIGNIFICANT CHANGE UP (ref 26–192)
CO2 SERPL-SCNC: 29 MMOL/L — SIGNIFICANT CHANGE UP (ref 22–31)
CREAT SERPL-MCNC: 0.63 MG/DL — SIGNIFICANT CHANGE UP (ref 0.5–1.3)
EOSINOPHIL # BLD AUTO: 0.06 K/UL — SIGNIFICANT CHANGE UP (ref 0–0.5)
EOSINOPHIL NFR BLD AUTO: 1 % — SIGNIFICANT CHANGE UP (ref 0–6)
GLUCOSE SERPL-MCNC: 88 MG/DL — SIGNIFICANT CHANGE UP (ref 70–99)
HCT VFR BLD CALC: 39.3 % — SIGNIFICANT CHANGE UP (ref 34.5–45)
HGB BLD-MCNC: 11.7 G/DL — SIGNIFICANT CHANGE UP (ref 11.5–15.5)
IMM GRANULOCYTES NFR BLD AUTO: 0.2 % — SIGNIFICANT CHANGE UP (ref 0–1.5)
INR BLD: 1.9 RATIO — HIGH (ref 0.88–1.16)
LYMPHOCYTES # BLD AUTO: 1.95 K/UL — SIGNIFICANT CHANGE UP (ref 1–3.3)
LYMPHOCYTES # BLD AUTO: 31.8 % — SIGNIFICANT CHANGE UP (ref 13–44)
MCHC RBC-ENTMCNC: 19.4 PG — LOW (ref 27–34)
MCHC RBC-ENTMCNC: 29.8 GM/DL — LOW (ref 32–36)
MCV RBC AUTO: 65.3 FL — LOW (ref 80–100)
MONOCYTES # BLD AUTO: 0.33 K/UL — SIGNIFICANT CHANGE UP (ref 0–0.9)
MONOCYTES NFR BLD AUTO: 5.4 % — SIGNIFICANT CHANGE UP (ref 2–14)
NEUTROPHILS # BLD AUTO: 3.77 K/UL — SIGNIFICANT CHANGE UP (ref 1.8–7.4)
NEUTROPHILS NFR BLD AUTO: 61.3 % — SIGNIFICANT CHANGE UP (ref 43–77)
NRBC # BLD: 0 /100 WBCS — SIGNIFICANT CHANGE UP (ref 0–0)
PLATELET # BLD AUTO: 233 K/UL — SIGNIFICANT CHANGE UP (ref 150–400)
POTASSIUM SERPL-MCNC: 4.7 MMOL/L — SIGNIFICANT CHANGE UP (ref 3.5–5.3)
POTASSIUM SERPL-SCNC: 4.7 MMOL/L — SIGNIFICANT CHANGE UP (ref 3.5–5.3)
PROT SERPL-MCNC: 7.3 GM/DL — SIGNIFICANT CHANGE UP (ref 6–8.3)
PROTHROM AB SERPL-ACNC: 20.8 SEC — HIGH (ref 9.8–12.7)
RBC # BLD: 6.02 M/UL — HIGH (ref 3.8–5.2)
RBC # FLD: 15.8 % — HIGH (ref 10.3–14.5)
SODIUM SERPL-SCNC: 144 MMOL/L — SIGNIFICANT CHANGE UP (ref 135–145)
TROPONIN I SERPL-MCNC: <0.015 NG/ML — SIGNIFICANT CHANGE UP (ref 0.01–0.04)
WBC # BLD: 6.14 K/UL — SIGNIFICANT CHANGE UP (ref 3.8–10.5)
WBC # FLD AUTO: 6.14 K/UL — SIGNIFICANT CHANGE UP (ref 3.8–10.5)

## 2018-05-21 PROCEDURE — 70450 CT HEAD/BRAIN W/O DYE: CPT | Mod: 26

## 2018-05-21 PROCEDURE — 93010 ELECTROCARDIOGRAM REPORT: CPT

## 2018-05-21 PROCEDURE — 99284 EMERGENCY DEPT VISIT MOD MDM: CPT

## 2018-05-21 RX ORDER — SODIUM CHLORIDE 9 MG/ML
3 INJECTION INTRAMUSCULAR; INTRAVENOUS; SUBCUTANEOUS ONCE
Qty: 0 | Refills: 0 | Status: COMPLETED | OUTPATIENT
Start: 2018-05-21 | End: 2018-05-21

## 2018-05-21 RX ADMIN — SODIUM CHLORIDE 3 MILLILITER(S): 9 INJECTION INTRAMUSCULAR; INTRAVENOUS; SUBCUTANEOUS at 18:33

## 2018-05-21 NOTE — ED ADULT NURSE REASSESSMENT NOTE - GENERAL PATIENT STATE
comfortable appearance/cooperative/resting/sleeping
cooperative/resting/sleeping/family/SO at bedside/comfortable appearance

## 2018-05-21 NOTE — ED ADULT NURSE REASSESSMENT NOTE - COMFORT CARE
side rails up/repositioned/hourly rounding completed
side rails up/warm blanket provided/hourly rounding completed

## 2018-05-21 NOTE — ED PROVIDER NOTE - NEUROLOGICAL, MLM
Alert and oriented, no focal deficits. Cranial nerves 2-12 intact. Normal sensation. Right leg motor 4+ out of 5. Other extremities 5/5.

## 2018-05-21 NOTE — ED PROVIDER NOTE - MEDICAL DECISION MAKING DETAILS
Elderly female, 3 months s/p CVA asssosciated with right sided weakness and dizziness near completely resolved over time and physical therapy ambulatory with family to ED regarding 5-10 miniute episode of dizziness with elevated blood pressure at home. Presently pt asymptomatic. Plan serial vital sign check, EKG, CT head, monitor, labs, UA, observe, reassess.

## 2018-05-21 NOTE — ED ADULT NURSE NOTE - NS ED NURSE DC INFO COMPLEXITY
Simple: Patient demonstrates quick and easy understanding/Straightforward: Basic instructions, no meds, no home treatment/Verbalized Understanding/Returned Demonstration/Patient asked questions

## 2018-05-21 NOTE — ED ADULT TRIAGE NOTE - CHIEF COMPLAINT QUOTE
Pt reports SBP in the 160's-180's while at home. Pt took medication at home with 's at this time. Pt denies any pain at this time. Reports she felt h/a when BP elevated. Pt reports hx of recent cva related to BP.  Denies any deficits at this time.  Pt speaks Portuguese.

## 2018-05-21 NOTE — ED PROVIDER NOTE - OBJECTIVE STATEMENT
74 y/o Female with a PMHx of CVA, DVT(Coumadin), HTN presents to ED BIBEMS c/o Hypertension. Pt reports that she took her bp at home 2 hours ago and found it to be in 180s systolic. +Associated Dizziness that lasted for 5-10 minutes. She then treated with her prescribed medication which lowered blood pressure. Pt reports that she felt similar dizziness when she had a CVA 2 months ago. Pt currently denies any headache, visual changes, speech changes, any increased weakness(baseline right leg weakness from prior CVA), facial drooping, or any other neurological symptoms. Pt also adds that she sometimes feels burning up midline of chest that is worsened with food. Pt reports she currently feels well, is able to provide a hx, and reports of no other acute Sx. Allergy to Penicillin. PMD [place].

## 2018-05-21 NOTE — ED PROVIDER NOTE - CONSTITUTIONAL, MLM
normal... Elderly female, Well appearing, well nourished, awake, alert, oriented to person, place, time/situation and in no apparent distress.

## 2018-05-21 NOTE — ED ADULT NURSE NOTE - CHIEF COMPLAINT QUOTE
Pt reports SBP in the 160's-180's while at home. Pt took medication at home with 's at this time. Pt denies any pain at this time. Reports she felt h/a when BP elevated. Pt reports hx of recent cva related to BP.  Denies any deficits at this time.  Pt speaks Polish.

## 2018-06-17 ENCOUNTER — EMERGENCY (EMERGENCY)
Facility: HOSPITAL | Age: 73
LOS: 0 days | Discharge: HOME | End: 2018-06-17
Attending: EMERGENCY MEDICINE | Admitting: EMERGENCY MEDICINE
Payer: MEDICARE

## 2018-06-17 VITALS
DIASTOLIC BLOOD PRESSURE: 68 MMHG | TEMPERATURE: 99 F | HEART RATE: 83 BPM | OXYGEN SATURATION: 96 % | RESPIRATION RATE: 19 BRPM | SYSTOLIC BLOOD PRESSURE: 131 MMHG

## 2018-06-17 VITALS
SYSTOLIC BLOOD PRESSURE: 139 MMHG | OXYGEN SATURATION: 99 % | DIASTOLIC BLOOD PRESSURE: 76 MMHG | RESPIRATION RATE: 18 BRPM | HEART RATE: 87 BPM

## 2018-06-17 DIAGNOSIS — I10 ESSENTIAL (PRIMARY) HYPERTENSION: ICD-10-CM

## 2018-06-17 DIAGNOSIS — R06.2 WHEEZING: ICD-10-CM

## 2018-06-17 DIAGNOSIS — Z79.82 LONG TERM (CURRENT) USE OF ASPIRIN: ICD-10-CM

## 2018-06-17 DIAGNOSIS — R06.02 SHORTNESS OF BREATH: ICD-10-CM

## 2018-06-17 DIAGNOSIS — Z90.710 ACQUIRED ABSENCE OF BOTH CERVIX AND UTERUS: Chronic | ICD-10-CM

## 2018-06-17 DIAGNOSIS — Z79.01 LONG TERM (CURRENT) USE OF ANTICOAGULANTS: ICD-10-CM

## 2018-06-17 DIAGNOSIS — Z86.73 PERSONAL HISTORY OF TRANSIENT ISCHEMIC ATTACK (TIA), AND CEREBRAL INFARCTION WITHOUT RESIDUAL DEFICITS: ICD-10-CM

## 2018-06-17 DIAGNOSIS — J20.9 ACUTE BRONCHITIS, UNSPECIFIED: ICD-10-CM

## 2018-06-17 DIAGNOSIS — B34.9 VIRAL INFECTION, UNSPECIFIED: ICD-10-CM

## 2018-06-17 DIAGNOSIS — R60.9 EDEMA, UNSPECIFIED: ICD-10-CM

## 2018-06-17 DIAGNOSIS — R05 COUGH: ICD-10-CM

## 2018-06-17 DIAGNOSIS — Z79.899 OTHER LONG TERM (CURRENT) DRUG THERAPY: ICD-10-CM

## 2018-06-17 LAB
ALBUMIN SERPL ELPH-MCNC: 3.3 G/DL — SIGNIFICANT CHANGE UP (ref 3.3–5)
ALP SERPL-CCNC: 83 U/L — SIGNIFICANT CHANGE UP (ref 40–120)
ALT FLD-CCNC: 24 U/L — SIGNIFICANT CHANGE UP (ref 12–78)
ANION GAP SERPL CALC-SCNC: 6 MMOL/L — SIGNIFICANT CHANGE UP (ref 5–17)
ANISOCYTOSIS BLD QL: SLIGHT — SIGNIFICANT CHANGE UP
APTT BLD: 35.8 SEC — SIGNIFICANT CHANGE UP (ref 27.5–37.4)
AST SERPL-CCNC: 17 U/L — SIGNIFICANT CHANGE UP (ref 15–37)
BASOPHILS # BLD AUTO: 0.04 K/UL — SIGNIFICANT CHANGE UP (ref 0–0.2)
BASOPHILS NFR BLD AUTO: 0.4 % — SIGNIFICANT CHANGE UP (ref 0–2)
BILIRUB SERPL-MCNC: 0.3 MG/DL — SIGNIFICANT CHANGE UP (ref 0.2–1.2)
BUN SERPL-MCNC: 25 MG/DL — HIGH (ref 7–23)
CALCIUM SERPL-MCNC: 8.7 MG/DL — SIGNIFICANT CHANGE UP (ref 8.5–10.1)
CHLORIDE SERPL-SCNC: 108 MMOL/L — SIGNIFICANT CHANGE UP (ref 96–108)
CK SERPL-CCNC: 32 U/L — SIGNIFICANT CHANGE UP (ref 26–192)
CO2 SERPL-SCNC: 27 MMOL/L — SIGNIFICANT CHANGE UP (ref 22–31)
CREAT SERPL-MCNC: 1.01 MG/DL — SIGNIFICANT CHANGE UP (ref 0.5–1.3)
EOSINOPHIL # BLD AUTO: 0.34 K/UL — SIGNIFICANT CHANGE UP (ref 0–0.5)
EOSINOPHIL NFR BLD AUTO: 3.1 % — SIGNIFICANT CHANGE UP (ref 0–6)
GLUCOSE SERPL-MCNC: 121 MG/DL — HIGH (ref 70–99)
HCT VFR BLD CALC: 39.4 % — SIGNIFICANT CHANGE UP (ref 34.5–45)
HGB BLD-MCNC: 11.8 G/DL — SIGNIFICANT CHANGE UP (ref 11.5–15.5)
IMM GRANULOCYTES NFR BLD AUTO: 0.4 % — SIGNIFICANT CHANGE UP (ref 0–1.5)
INR BLD: 2.5 RATIO — HIGH (ref 0.88–1.16)
LYMPHOCYTES # BLD AUTO: 2.67 K/UL — SIGNIFICANT CHANGE UP (ref 1–3.3)
LYMPHOCYTES # BLD AUTO: 24 % — SIGNIFICANT CHANGE UP (ref 13–44)
MANUAL SMEAR VERIFICATION: SIGNIFICANT CHANGE UP
MCHC RBC-ENTMCNC: 19.4 PG — LOW (ref 27–34)
MCHC RBC-ENTMCNC: 29.9 GM/DL — LOW (ref 32–36)
MCV RBC AUTO: 64.9 FL — LOW (ref 80–100)
MICROCYTES BLD QL: SIGNIFICANT CHANGE UP
MONOCYTES # BLD AUTO: 1.22 K/UL — HIGH (ref 0–0.9)
MONOCYTES NFR BLD AUTO: 11 % — SIGNIFICANT CHANGE UP (ref 2–14)
NEUTROPHILS # BLD AUTO: 6.82 K/UL — SIGNIFICANT CHANGE UP (ref 1.8–7.4)
NEUTROPHILS NFR BLD AUTO: 61.1 % — SIGNIFICANT CHANGE UP (ref 43–77)
NRBC # BLD: 0 /100 WBCS — SIGNIFICANT CHANGE UP (ref 0–0)
NT-PROBNP SERPL-SCNC: 278 PG/ML — HIGH (ref 0–125)
PLAT MORPH BLD: NORMAL — SIGNIFICANT CHANGE UP
PLATELET # BLD AUTO: 233 K/UL — SIGNIFICANT CHANGE UP (ref 150–400)
POIKILOCYTOSIS BLD QL AUTO: SLIGHT — SIGNIFICANT CHANGE UP
POTASSIUM SERPL-MCNC: 3.7 MMOL/L — SIGNIFICANT CHANGE UP (ref 3.5–5.3)
POTASSIUM SERPL-SCNC: 3.7 MMOL/L — SIGNIFICANT CHANGE UP (ref 3.5–5.3)
PROT SERPL-MCNC: 6.9 GM/DL — SIGNIFICANT CHANGE UP (ref 6–8.3)
PROTHROM AB SERPL-ACNC: 27.5 SEC — HIGH (ref 9.8–12.7)
RBC # BLD: 6.07 M/UL — HIGH (ref 3.8–5.2)
RBC # FLD: 15.4 % — HIGH (ref 10.3–14.5)
RBC BLD AUTO: ABNORMAL
SODIUM SERPL-SCNC: 141 MMOL/L — SIGNIFICANT CHANGE UP (ref 135–145)
TROPONIN I SERPL-MCNC: <0.015 NG/ML — SIGNIFICANT CHANGE UP (ref 0.01–0.04)
TROPONIN I SERPL-MCNC: <0.015 NG/ML — SIGNIFICANT CHANGE UP (ref 0.01–0.04)
WBC # BLD: 11.13 K/UL — HIGH (ref 3.8–10.5)
WBC # FLD AUTO: 11.13 K/UL — HIGH (ref 3.8–10.5)

## 2018-06-17 PROCEDURE — 71275 CT ANGIOGRAPHY CHEST: CPT | Mod: 26

## 2018-06-17 PROCEDURE — 71045 X-RAY EXAM CHEST 1 VIEW: CPT | Mod: 26

## 2018-06-17 PROCEDURE — 93010 ELECTROCARDIOGRAM REPORT: CPT

## 2018-06-17 PROCEDURE — 99285 EMERGENCY DEPT VISIT HI MDM: CPT

## 2018-06-17 RX ORDER — ALBUTEROL 90 UG/1
2 AEROSOL, METERED ORAL
Qty: 1 | Refills: 0 | OUTPATIENT
Start: 2018-06-17

## 2018-06-17 RX ORDER — IPRATROPIUM/ALBUTEROL SULFATE 18-103MCG
3 AEROSOL WITH ADAPTER (GRAM) INHALATION ONCE
Qty: 0 | Refills: 0 | Status: COMPLETED | OUTPATIENT
Start: 2018-06-17 | End: 2018-06-17

## 2018-06-17 RX ORDER — SODIUM CHLORIDE 9 MG/ML
3 INJECTION INTRAMUSCULAR; INTRAVENOUS; SUBCUTANEOUS ONCE
Qty: 0 | Refills: 0 | Status: COMPLETED | OUTPATIENT
Start: 2018-06-17 | End: 2018-06-17

## 2018-06-17 RX ADMIN — Medication 3 MILLILITER(S): at 02:15

## 2018-06-17 RX ADMIN — SODIUM CHLORIDE 3 MILLILITER(S): 9 INJECTION INTRAMUSCULAR; INTRAVENOUS; SUBCUTANEOUS at 01:12

## 2018-06-17 RX ADMIN — Medication 125 MILLIGRAM(S): at 03:29

## 2018-06-17 NOTE — ED ADULT NURSE REASSESSMENT NOTE - NS ED NURSE REASSESS COMMENT FT1
0400: Nurse Rounding: Patient has decrease in wheezing with steroid and nebulizer treatments. Patient drinking coffee and tolerating PO well. Cough less frequently heard. Patient awaiting 2nd set of cardiac enzymes and further ED MD assessment/disposition. Patient updated on her status and the plan of care. Will continue to monitor/reassess.

## 2018-06-17 NOTE — ED PROVIDER NOTE - OBJECTIVE STATEMENT
Pt. is a 72 yo F with a hx of HTN on amlodipine and enalapril, hx of peripheral edema, hx of DVT yrs ago still on coumadin, peripheral neuropathy, hyperlipidemia BIB family for productive cough X 2 weeks and wheezing.  Daughter states PMD put pt. on robitussin and azithromycin and she took her first 2 doses today without relief. Pt. is a 74 yo F with a hx of HTN on amlodipine and enalapril, hx of peripheral edema, hx of DVT yrs ago still on coumadin, peripheral neuropathy, hyperlipidemia BIB family for productive cough X 2 weeks and wheezing.  Daughter states PMD put pt. on robitussin and azithromycin and she took her first 2 doses today without relief.  Pt. denies chest pain, leg pain or worsened leg swelling.  Daughter visiting from [place] today states she took patients temp and tmax 98.5.  No fevers, chills, sweats. Pt. is a 72 yo F with a hx of CVA, HTN on amlodipine and enalapril, hx of peripheral edema, hx of DVT yrs ago still on coumadin, peripheral neuropathy, hyperlipidemia BIB family for productive cough X 2 weeks and wheezing.  Daughter states PMD put pt. on robitussin and azithromycin and she took her first 2 doses today without relief.  Pt. denies chest pain, leg pain or worsened leg swelling.  Daughter visiting from [place] today states she took patients temp and tmax 98.5.  No fevers, chills, sweats.  Pts daughter states that patient is unable to lie flat due to difficulty breathing.

## 2018-06-17 NOTE — ED ADULT NURSE NOTE - OBJECTIVE STATEMENT
73 year old female with a past medical history of CHF, hypertension, and a chronic DVT in her lower extremity presenting to the ED complaining of SOB and a cough. Patient reports more mild SOB for the past week associated with a frequent dry non-productive cough. Saw her PCP who prescribed her a Prednisone pack with relief of signs and symptoms. Patient presents to the ED complaining of worsening cough and SOB, endorses orthopnea and activity intolerance. Patient is alert and appears comfortable at rest, states SOB is worse with moving/walking. SPO2 above 95% on room air.   negative: pre-syncope, headache, vision changes, lateralizing neuromuscular deficits, chest pain, dyspnea at rest, abdominal pain, nausea, vomiting, constipation, diarrhea, edema, or urinary signs and symptoms.

## 2018-06-17 NOTE — ED PROVIDER NOTE - CARE PLAN
Principal Discharge DX:	Acute bronchitis, unspecified organism  Secondary Diagnosis:	Viral upper respiratory illness

## 2018-06-17 NOTE — ED ADULT NURSE REASSESSMENT NOTE - NS ED NURSE REASSESS COMMENT FT1
0230: Nurse Rounding: Patient is sleeping at time of rounding, no apparent distress, complaints, or comfort measures needed at this time. Patient is awaiting CTA results. Will continue to monitor/reassess and ensure comfort/safety.

## 2018-07-12 ENCOUNTER — INPATIENT (INPATIENT)
Facility: HOSPITAL | Age: 73
LOS: 4 days | Discharge: HOME | End: 2018-07-17
Attending: INTERNAL MEDICINE | Admitting: INTERNAL MEDICINE
Payer: MEDICARE

## 2018-07-12 VITALS
OXYGEN SATURATION: 100 % | RESPIRATION RATE: 18 BRPM | WEIGHT: 149.91 LBS | SYSTOLIC BLOOD PRESSURE: 144 MMHG | HEART RATE: 75 BPM | HEIGHT: 65 IN | DIASTOLIC BLOOD PRESSURE: 72 MMHG | TEMPERATURE: 98 F

## 2018-07-12 DIAGNOSIS — Z90.710 ACQUIRED ABSENCE OF BOTH CERVIX AND UTERUS: Chronic | ICD-10-CM

## 2018-07-12 LAB
ADD ON TEST-SPECIMEN IN LAB: SIGNIFICANT CHANGE UP
ALBUMIN SERPL ELPH-MCNC: 3.6 G/DL — SIGNIFICANT CHANGE UP (ref 3.3–5)
ALP SERPL-CCNC: 86 U/L — SIGNIFICANT CHANGE UP (ref 40–120)
ALT FLD-CCNC: 21 U/L — SIGNIFICANT CHANGE UP (ref 12–78)
ANION GAP SERPL CALC-SCNC: 7 MMOL/L — SIGNIFICANT CHANGE UP (ref 5–17)
ANISOCYTOSIS BLD QL: SLIGHT — SIGNIFICANT CHANGE UP
APPEARANCE UR: CLEAR — SIGNIFICANT CHANGE UP
APTT BLD: 36.8 SEC — SIGNIFICANT CHANGE UP (ref 27.5–37.4)
AST SERPL-CCNC: 23 U/L — SIGNIFICANT CHANGE UP (ref 15–37)
BASOPHILS # BLD AUTO: 0.03 K/UL — SIGNIFICANT CHANGE UP (ref 0–0.2)
BASOPHILS NFR BLD AUTO: 0.5 % — SIGNIFICANT CHANGE UP (ref 0–2)
BILIRUB SERPL-MCNC: 0.4 MG/DL — SIGNIFICANT CHANGE UP (ref 0.2–1.2)
BILIRUB UR-MCNC: NEGATIVE — SIGNIFICANT CHANGE UP
BUN SERPL-MCNC: 11 MG/DL — SIGNIFICANT CHANGE UP (ref 7–23)
CALCIUM SERPL-MCNC: 8.9 MG/DL — SIGNIFICANT CHANGE UP (ref 8.5–10.1)
CHLORIDE SERPL-SCNC: 104 MMOL/L — SIGNIFICANT CHANGE UP (ref 96–108)
CO2 SERPL-SCNC: 26 MMOL/L — SIGNIFICANT CHANGE UP (ref 22–31)
COLOR SPEC: YELLOW — SIGNIFICANT CHANGE UP
CREAT SERPL-MCNC: 0.68 MG/DL — SIGNIFICANT CHANGE UP (ref 0.5–1.3)
DIFF PNL FLD: NEGATIVE — SIGNIFICANT CHANGE UP
ELLIPTOCYTES BLD QL SMEAR: SLIGHT — SIGNIFICANT CHANGE UP
EOSINOPHIL # BLD AUTO: 0.21 K/UL — SIGNIFICANT CHANGE UP (ref 0–0.5)
EOSINOPHIL NFR BLD AUTO: 3.8 % — SIGNIFICANT CHANGE UP (ref 0–6)
GLUCOSE SERPL-MCNC: 114 MG/DL — HIGH (ref 70–99)
GLUCOSE UR QL: NEGATIVE MG/DL — SIGNIFICANT CHANGE UP
HCT VFR BLD CALC: 38.1 % — SIGNIFICANT CHANGE UP (ref 34.5–45)
HGB BLD-MCNC: 11.4 G/DL — LOW (ref 11.5–15.5)
HYPOCHROMIA BLD QL: SLIGHT — SIGNIFICANT CHANGE UP
IMM GRANULOCYTES NFR BLD AUTO: 0.2 % — SIGNIFICANT CHANGE UP (ref 0–1.5)
INR BLD: 2.5 RATIO — HIGH (ref 0.88–1.16)
KETONES UR-MCNC: NEGATIVE — SIGNIFICANT CHANGE UP
LEUKOCYTE ESTERASE UR-ACNC: NEGATIVE — SIGNIFICANT CHANGE UP
LYMPHOCYTES # BLD AUTO: 2.31 K/UL — SIGNIFICANT CHANGE UP (ref 1–3.3)
LYMPHOCYTES # BLD AUTO: 42 % — SIGNIFICANT CHANGE UP (ref 13–44)
MANUAL SMEAR VERIFICATION: SIGNIFICANT CHANGE UP
MCHC RBC-ENTMCNC: 19.4 PG — LOW (ref 27–34)
MCHC RBC-ENTMCNC: 29.9 GM/DL — LOW (ref 32–36)
MCV RBC AUTO: 64.7 FL — LOW (ref 80–100)
MICROCYTES BLD QL: SIGNIFICANT CHANGE UP
MONOCYTES # BLD AUTO: 0.44 K/UL — SIGNIFICANT CHANGE UP (ref 0–0.9)
MONOCYTES NFR BLD AUTO: 8 % — SIGNIFICANT CHANGE UP (ref 2–14)
NEUTROPHILS # BLD AUTO: 2.5 K/UL — SIGNIFICANT CHANGE UP (ref 1.8–7.4)
NEUTROPHILS NFR BLD AUTO: 45.5 % — SIGNIFICANT CHANGE UP (ref 43–77)
NITRITE UR-MCNC: NEGATIVE — SIGNIFICANT CHANGE UP
NRBC # BLD: 0 /100 WBCS — SIGNIFICANT CHANGE UP (ref 0–0)
OVALOCYTES BLD QL SMEAR: SLIGHT — SIGNIFICANT CHANGE UP
PH UR: 5 — SIGNIFICANT CHANGE UP (ref 5–8)
PLAT MORPH BLD: NORMAL — SIGNIFICANT CHANGE UP
PLATELET # BLD AUTO: 240 K/UL — SIGNIFICANT CHANGE UP (ref 150–400)
POIKILOCYTOSIS BLD QL AUTO: SLIGHT — SIGNIFICANT CHANGE UP
POTASSIUM SERPL-MCNC: 4 MMOL/L — SIGNIFICANT CHANGE UP (ref 3.5–5.3)
POTASSIUM SERPL-SCNC: 4 MMOL/L — SIGNIFICANT CHANGE UP (ref 3.5–5.3)
PROT SERPL-MCNC: 7.5 GM/DL — SIGNIFICANT CHANGE UP (ref 6–8.3)
PROT UR-MCNC: NEGATIVE MG/DL — SIGNIFICANT CHANGE UP
PROTHROM AB SERPL-ACNC: 27.5 SEC — HIGH (ref 9.8–12.7)
RBC # BLD: 5.89 M/UL — HIGH (ref 3.8–5.2)
RBC # FLD: 16 % — HIGH (ref 10.3–14.5)
RBC BLD AUTO: ABNORMAL
SODIUM SERPL-SCNC: 137 MMOL/L — SIGNIFICANT CHANGE UP (ref 135–145)
SP GR SPEC: 1.01 — SIGNIFICANT CHANGE UP (ref 1.01–1.02)
TROPONIN I SERPL-MCNC: <0.015 NG/ML — SIGNIFICANT CHANGE UP (ref 0.01–0.04)
UROBILINOGEN FLD QL: NEGATIVE MG/DL — SIGNIFICANT CHANGE UP
WBC # BLD: 5.5 K/UL — SIGNIFICANT CHANGE UP (ref 3.8–10.5)
WBC # FLD AUTO: 5.5 K/UL — SIGNIFICANT CHANGE UP (ref 3.8–10.5)

## 2018-07-12 PROCEDURE — 99285 EMERGENCY DEPT VISIT HI MDM: CPT

## 2018-07-12 PROCEDURE — 71046 X-RAY EXAM CHEST 2 VIEWS: CPT | Mod: 26

## 2018-07-12 PROCEDURE — 93010 ELECTROCARDIOGRAM REPORT: CPT

## 2018-07-12 RX ORDER — ONDANSETRON 8 MG/1
4 TABLET, FILM COATED ORAL EVERY 4 HOURS
Qty: 0 | Refills: 0 | Status: DISCONTINUED | OUTPATIENT
Start: 2018-07-12 | End: 2018-07-17

## 2018-07-12 RX ORDER — WARFARIN SODIUM 2.5 MG/1
2 TABLET ORAL DAILY
Qty: 0 | Refills: 0 | Status: DISCONTINUED | OUTPATIENT
Start: 2018-07-12 | End: 2018-07-14

## 2018-07-12 RX ORDER — ATORVASTATIN CALCIUM 80 MG/1
40 TABLET, FILM COATED ORAL AT BEDTIME
Qty: 0 | Refills: 0 | Status: DISCONTINUED | OUTPATIENT
Start: 2018-07-12 | End: 2018-07-17

## 2018-07-12 RX ORDER — ASPIRIN/CALCIUM CARB/MAGNESIUM 324 MG
162 TABLET ORAL ONCE
Qty: 0 | Refills: 0 | Status: COMPLETED | OUTPATIENT
Start: 2018-07-12 | End: 2018-07-12

## 2018-07-12 RX ORDER — ASPIRIN/CALCIUM CARB/MAGNESIUM 324 MG
81 TABLET ORAL DAILY
Qty: 0 | Refills: 0 | Status: DISCONTINUED | OUTPATIENT
Start: 2018-07-13 | End: 2018-07-17

## 2018-07-12 RX ORDER — ACETAMINOPHEN 500 MG
1000 TABLET ORAL EVERY 8 HOURS
Qty: 0 | Refills: 0 | Status: DISCONTINUED | OUTPATIENT
Start: 2018-07-12 | End: 2018-07-17

## 2018-07-12 RX ORDER — DOCUSATE SODIUM 100 MG
100 CAPSULE ORAL
Qty: 0 | Refills: 0 | Status: DISCONTINUED | OUTPATIENT
Start: 2018-07-12 | End: 2018-07-17

## 2018-07-12 RX ORDER — PANTOPRAZOLE SODIUM 20 MG/1
1 TABLET, DELAYED RELEASE ORAL
Qty: 0 | Refills: 0 | COMMUNITY

## 2018-07-12 RX ORDER — NITROGLYCERIN 6.5 MG
0.4 CAPSULE, EXTENDED RELEASE ORAL
Qty: 0 | Refills: 0 | Status: DISCONTINUED | OUTPATIENT
Start: 2018-07-12 | End: 2018-07-17

## 2018-07-12 RX ORDER — AMLODIPINE BESYLATE 2.5 MG/1
5 TABLET ORAL AT BEDTIME
Qty: 0 | Refills: 0 | Status: DISCONTINUED | OUTPATIENT
Start: 2018-07-12 | End: 2018-07-17

## 2018-07-12 RX ORDER — SODIUM CHLORIDE 9 MG/ML
1000 INJECTION INTRAMUSCULAR; INTRAVENOUS; SUBCUTANEOUS
Qty: 0 | Refills: 0 | Status: DISCONTINUED | OUTPATIENT
Start: 2018-07-12 | End: 2018-07-17

## 2018-07-12 RX ADMIN — Medication 162 MILLIGRAM(S): at 13:05

## 2018-07-12 RX ADMIN — AMLODIPINE BESYLATE 5 MILLIGRAM(S): 2.5 TABLET ORAL at 22:32

## 2018-07-12 RX ADMIN — ATORVASTATIN CALCIUM 40 MILLIGRAM(S): 80 TABLET, FILM COATED ORAL at 22:32

## 2018-07-12 RX ADMIN — WARFARIN SODIUM 2 MILLIGRAM(S): 2.5 TABLET ORAL at 22:32

## 2018-07-12 RX ADMIN — SODIUM CHLORIDE 100 MILLILITER(S): 9 INJECTION INTRAMUSCULAR; INTRAVENOUS; SUBCUTANEOUS at 15:28

## 2018-07-12 NOTE — ED PROVIDER NOTE - PMH
CHF (congestive heart failure)    CVA (cerebral vascular accident)    DVT (deep venous thrombosis)    HLD (hyperlipidemia)    HTN (hypertension)

## 2018-07-12 NOTE — ED ADULT NURSE NOTE - DISCHARGE DATE/TIME
Health Maintenance Summary     Topic Due On Due Status Completed On    Colorectal Cancer Screening - Colonoscopy Jun 11, 2017 Overdue     MAMMOGRAM - BREAST CANCER SCREENING Sep 11, 2018 Not Due Sep 11, 2017    Pap Smear - Cervical Cancer Screening  Sep 20, 2021 Not Due Sep 20, 2016    Immunization - TDAP Pregnancy  Hidden     IMMUNIZATION - DTaP/Tdap/Td Aug 12, 2027 Not Due Aug 12, 2017    Immunization-Influenza Sep 1, 2017 Overdue Sep 21, 2016          Patient is due for topics as listed above, she wishes to get flu shot today and ordered another Cologuard .     12-Jul-2018 17:27

## 2018-07-12 NOTE — H&P ADULT - HISTORY OF PRESENT ILLNESS
This is a pleasant 73 y.o F PMH of HTN, diastolic CHF, CVA (2/2018) with resolved right sided weakness, DVT in LLE on Coumadin brought to the ED by family for concerns of headache, high blood pressure and generalized weakness that patient was concerned she may have another stroke. She reports similar symptoms to prior CVA. Since Feb 2018 patient has been seen in the ED twice with these concerns including elevated BP at home then discharged after negative workup. Per family, patient has not been eating well at home and today was found to have 's which worried patient that she may have a stroke.   In the ED, no focal deficits found, CBC and CMP unremarkable. Denied headache but then endorsed having chest pressure yesterday to ED physician prompting EKG which revealed new T wave inversions in the lateral leads.   Trop x1 normal, CXR clear except for possible right hemidiaphragm elevation and admission requested to rule off ACS.     Upon discussion with patient and children, patient endorses left sided "chest twitching" associated only with coughing. Denies MONTILLA or active chest pain. Actually wants to go home now but understands need for hospitalization given new EKG findings.     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    Past Surgical History:  S/P hysterectomy.  Meds: reviewed w/ family.   Allergies: PCN  Social Hx: lives with daughter and son at home. Nonsmoker, no ETOH / illicit drug use.     ROS: + generalized weakness otherwise no fevers / chills. All other 14pt ROS.       Vital Signs Last 24 Hrs  T(C): 36.4 (12 Jul 2018 11:59), Max: 36.4 (12 Jul 2018 11:59)  T(F): 97.6 (12 Jul 2018 11:59), Max: 97.6 (12 Jul 2018 11:59)  HR: 67 (12 Jul 2018 13:05) (67 - 75)  BP: 110/79 (12 Jul 2018 13:05) (110/79 - 144/72)  BP(mean): --  RR: 15 (12 Jul 2018 13:05) (15 - 18)  SpO2: 100% (12 Jul 2018 13:05) (100% - 100%)    PHYSICAL EXAM:  Constitutional: NAD, awake and alert, well-developed elderly female, soft spoken.   HEENT: PERR, EOMI, Normal Hearing, MMM  Neck: Soft and supple, No LAD, No JVD  Respiratory: Breath sounds are clear bilaterally, No wheezing, rales or rhonchi  Cardiovascular: S1 and S2, regular rate and rhythm, no Murmurs, gallops or rubs  Gastrointestinal: Bowel Sounds present, soft, nontender, nondistended, no guarding, no rebound  Extremities: mildly increased L>Right calf edema, at baseline, no pain on palpation.   Vascular: 2+ peripheral pulses  Neurological: A/O x 3, no focal deficits  Musculoskeletal: 5/5 strength b/l upper and lower extremities  Skin: No rashes    MEDICATIONS  (STANDING):  amLODIPine   Tablet 5 milliGRAM(s) Oral at bedtime  atorvastatin 40 milliGRAM(s) Oral at bedtime  sodium chloride 0.9%. 1000 milliLiter(s) (100 mL/Hr) IV Continuous <Continuous>  warfarin 2 milliGRAM(s) Oral daily      LABS: All Labs Reviewed:                        11.4   5.50  )-----------( 240      ( 12 Jul 2018 12:27 )             38.1     07-12    137  |  104  |  11  ----------------------------<  114<H>  4.0   |  26  |  0.68    Ca    8.9      12 Jul 2018 12:27    TPro  7.5  /  Alb  3.6  /  TBili  0.4  /  DBili  x   /  AST  23  /  ALT  21  /  AlkPhos  86  07-12    PT/INR - ( 12 Jul 2018 12:27 )   PT: 27.5 sec;   INR: 2.50 ratio    PTT - ( 12 Jul 2018 12:27 )  PTT:36.8 sec  CARDIAC MARKERS ( 12 Jul 2018 12:27 )  <0.015 ng/mL / x     / x     / x     / x        EKG: NSR HR 69, new T wave inversions in V4-V6. This is a pleasant 73 y.o F PMH of HTN, diastolic CHF, CVA (2/2018) with resolved right sided weakness, DVT in LLE on Coumadin brought to the ED by family for concerns of headache, high blood pressure and generalized weakness that patient was concerned she may have another stroke. She reports similar symptoms to prior CVA. Since Feb 2018 patient has been seen in the ED twice with these concerns including elevated BP at home then discharged after negative workup. Per family, patient has not been eating well at home and today was found to have 's which worried patient that she may have a stroke.   In the ED, no focal deficits found, CBC and CMP unremarkable. Denied headache but then endorsed having chest pressure yesterday to ED physician prompting EKG which revealed new T wave inversions in the lateral leads.   Trop x1 normal, CXR clear except for possible right hemidiaphragm elevation and admission requested to rule off ACS.     Upon discussion with patient and children, patient endorses left sided "chest twitching" associated only with coughing. Denies MONTILLA or active chest pain. Actually wants to go home now but understands need for hospitalization given new EKG findings.     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    Past Surgical History:  S/P hysterectomy.  Meds: reviewed w/ family.   Allergies: PCN  Social Hx: lives with daughter and son at home. Nonsmoker, no ETOH / illicit drug use.     ROS: + generalized weakness otherwise no fevers / chills. All other 14pt ROS.       Vital Signs Last 24 Hrs  T(C): 36.4 (12 Jul 2018 11:59), Max: 36.4 (12 Jul 2018 11:59)  T(F): 97.6 (12 Jul 2018 11:59), Max: 97.6 (12 Jul 2018 11:59)  HR: 67 (12 Jul 2018 13:05) (67 - 75)  BP: 110/79 (12 Jul 2018 13:05) (110/79 - 144/72)  BP(mean): --  RR: 15 (12 Jul 2018 13:05) (15 - 18)  SpO2: 100% (12 Jul 2018 13:05) (100% - 100%)    PHYSICAL EXAM:  Constitutional: NAD, awake and alert, well-developed elderly female, soft spoken.   HEENT: PERR, EOMI, Normal Hearing, MMM  Neck: Soft and supple, No LAD, No JVD  Respiratory: Breath sounds are clear bilaterally, No wheezing, rales or rhonchi  Cardiovascular: S1 and S2, regular rate and rhythm, no Murmurs, gallops or rubs  Gastrointestinal: Bowel Sounds present, soft, nontender, nondistended, no guarding, no rebound  Extremities: mildly increased L>Right calf edema, at baseline, no pain on palpation.   Vascular: 2+ peripheral pulses  Neurological: A/O x 3, no focal deficits  Musculoskeletal: 5/5 strength b/l upper and lower extremities  Skin: No rashes    MEDICATIONS  (STANDING):  amLODIPine   Tablet 5 milliGRAM(s) Oral at bedtime  atorvastatin 40 milliGRAM(s) Oral at bedtime  sodium chloride 0.9%. 1000 milliLiter(s) (100 mL/Hr) IV Continuous <Continuous>  warfarin 2 milliGRAM(s) Oral daily      LABS: All Labs Reviewed:                        11.4   5.50  )-----------( 240      ( 12 Jul 2018 12:27 )             38.1     07-12    137  |  104  |  11  ----------------------------<  114<H>  4.0   |  26  |  0.68    Ca    8.9      12 Jul 2018 12:27    TPro  7.5  /  Alb  3.6  /  TBili  0.4  /  DBili  x   /  AST  23  /  ALT  21  /  AlkPhos  86  07-12    PT/INR - ( 12 Jul 2018 12:27 )   PT: 27.5 sec;   INR: 2.50 ratio    PTT - ( 12 Jul 2018 12:27 )  PTT:36.8 sec  CARDIAC MARKERS ( 12 Jul 2018 12:27 )  <0.015 ng/mL / x     / x     / x     / x        EKG: NSR HR 69, new T wave inversions in V4-V6.      Xray Chest 2 Views PA/Lat (07.12.18 @ 13:27)   Heart magnified by AP film. Atherosclerotic aorta. Mild prominence of the   central pulmonary vasculature without gross focal infiltrate or pleural   collection. Marked elevation right hemidiaphragm.    Impression: As above This is a pleasant 73 y.o F PMH of HTN, diastolic CHF, CVA (2/2018) with resolved right sided weakness, DVT in LLE on Coumadin brought to the ED by family for concerns of headache, high blood pressure and generalized weakness that patient was concerned she may have another stroke. She reports similar symptoms to prior CVA. Since Feb 2018 patient has been seen in the ED twice with these concerns including elevated BP at home then discharged after negative workup. Per family, patient has not been eating well at home and today was found to have 's which worried patient that she may have a stroke.   In the ED, no focal deficits found, CBC and CMP unremarkable. Denied headache but then endorsed having chest pressure yesterday to ED physician prompting EKG which revealed new T wave inversions in the lateral leads.   Trop x1 normal, CXR clear except for possible right hemidiaphragm elevation and admission requested to rule off ACS.     Upon discussion with patient and children, patient endorses left sided "chest twitching" associated only with coughing. Denies MONTILLA or active chest pain. Actually wants to go home now but understands need for hospitalization given new EKG findings.     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    Past Surgical History:  S/P hysterectomy.  Meds: reviewed w/ family.   Allergies: PCN  Social Hx: lives with daughter and son at home. Nonsmoker, no ETOH / illicit drug use.     ROS: + generalized weakness otherwise no fevers / chills. All other 14pt ROS.       Vital Signs Last 24 Hrs  T(C): 36.4 (12 Jul 2018 11:59), Max: 36.4 (12 Jul 2018 11:59)  T(F): 97.6 (12 Jul 2018 11:59), Max: 97.6 (12 Jul 2018 11:59)  HR: 67 (12 Jul 2018 13:05) (67 - 75)  BP: 110/79 (12 Jul 2018 13:05) (110/79 - 144/72)  BP(mean): --  RR: 15 (12 Jul 2018 13:05) (15 - 18)  SpO2: 100% (12 Jul 2018 13:05) (100% - 100%)    PHYSICAL EXAM:  Constitutional: NAD, awake and alert, well-developed elderly female, soft spoken.   HEENT: PERR, EOMI, Normal Hearing, MMM  Neck: Soft and supple, No LAD, No JVD  Respiratory: Breath sounds are clear bilaterally, No wheezing, rales or rhonchi  Cardiovascular: S1 and S2, regular rate and rhythm, no Murmurs, gallops or rubs  Gastrointestinal: Bowel Sounds present, soft, nontender, nondistended, no guarding, no rebound  Extremities: mildly increased L>Right calf edema, at baseline, no pain on palpation.   Vascular: 2+ peripheral pulses  Neurological: A/O x 3, no focal deficits  Musculoskeletal: 5/5 strength b/l upper and lower extremities  Skin: No rashes    MEDICATIONS  (STANDING):  amLODIPine   Tablet 5 milliGRAM(s) Oral at bedtime  atorvastatin 40 milliGRAM(s) Oral at bedtime  sodium chloride 0.9%. 1000 milliLiter(s) (100 mL/Hr) IV Continuous <Continuous>  warfarin 2 milliGRAM(s) Oral daily    LABS: All Labs Reviewed:                        11.4   5.50  )-----------( 240      ( 12 Jul 2018 12:27 )             38.1     07-12    137  |  104  |  11  ----------------------------<  114<H>  4.0   |  26  |  0.68    Ca    8.9      12 Jul 2018 12:27    TPro  7.5  /  Alb  3.6  /  TBili  0.4  /  DBili  x   /  AST  23  /  ALT  21  /  AlkPhos  86  07-12    PT/INR - ( 12 Jul 2018 12:27 )   PT: 27.5 sec;   INR: 2.50 ratio    PTT - ( 12 Jul 2018 12:27 )  PTT:36.8 sec  CARDIAC MARKERS ( 12 Jul 2018 12:27 )  <0.015 ng/mL / x     / x     / x     / x        EKG: NSR HR 69, new T wave inversions in V4-V6.      Xray Chest 2 Views PA/Lat (07.12.18 @ 13:27)   Heart magnified by AP film. Atherosclerotic aorta. Mild prominence of the   central pulmonary vasculature without gross focal infiltrate or pleural   collection. Marked elevation right hemidiaphragm.    Impression: As above    ASSESSMENT AND PLAN:   This is a 73 y.o F PMH of left CVA with no residual deficits, HTN presented for weakness and mild headache resolving but with complaints of chest pressure found to have new EKG findings admitted to rule out ACS:     # Abnormal EKG / Generalized Weakness.   - initial concerns for possibly musculoskeletal related chest pressure as related to cough but now with abnormal EKG thus will admit to telemetry.   - trend trops.   - consult Dr. Lynne as family states patient seen in the past.   - no infection on CXR/ stable labs.   - no hx of hypothyroidism, TSH in April normal, no reason to suspect this to be abnormal now thus hold on further testing.   - hydrate with 1 L IVFs, check orthostatics.   - s/p full Aspirin in ED, not on daily aspirin. No bleeding issues --> start daily Asp 81mg.   - add statin in the setting of prior CVA.     # Hx of CVA - no gross deficits, starting Asp/ Statin.   - fall precautions.   - PT consult for ambulation.     # Uncontrolled HTN - better Bp now, suspect patient with component of anxiety contributing to elevated BP.   - may need to switch Norvasc from nighttime dose to AM dose, will trend for now.   - cont Enalpril 10mg QAM, Norvasc 5mg QHS.     # Hx of Diastolic CHF - appears compensated.     # Hx of Leg DVT - therapeutic on Coumadin 2mg QHS.   - daily INR.   - review of chart states in May had normal Doppler US of left leg --> follow up outpatient to discuss possibly stopping Coumadin if treated for 6months.     Diet: Kosher  Dispo; admit to telemetry. Discussed w/ ED, patient and family.   Tentative dc home tomorrow if stable and improved after cardiology evaluation.   Total time > 80 mins.

## 2018-07-12 NOTE — ED PROVIDER NOTE - OBJECTIVE STATEMENT
Pt is a 72 y/o F, with PMHx of HTN, CHF, HLD, DVT in the LLE (on coumadin), and prior CVA in 02/2018, presenting to the ED with c/o generalized weakness. Hx obtained via daughter at bedside. She states at 1200, pt developed an odd sensation that traveled up to her head. Reports that the sxs she developed was similar to her prior stroke. This episode lasted a few minutes, self resolved, and has not reoccurred. Per daughter, pt has been having fluctuating elevated BP at home from 140-170s SBP. Currently, pt c/o generalized weakness, chills, and LE swelling. Denies fevers, CP, SOB, abd pain, and N/V. Was recently treated for bronchitis. Pt is a 72 y/o F, with PMHx of HTN, CHF, HLD, DVT in the LLE (on coumadin), and prior CVA in 02/2018, presenting to the ED with c/o generalized weakness. Hx obtained via daughter at bedside. She states at 1200, pt developed an odd sensation that traveled up to her head. Reports that the sxs she developed was similar to her prior stroke. This episode lasted a few minutes, self resolved, and has not reoccurred. Pt also notes sensation of chest "heaviness" 2 days PTA that resolved.  Per daughter, pt has been having fluctuating elevated BP at home from 140-170s SBP. Currently, pt c/o generalized weakness, chills.   Bilateral L>RLE edema, to baseline.  Denies fevers, CP, SOB, abd pain, and N/V. Was recently treated for bronchitis and evaluated in ED.  Notes similar headache 1 month PTA, evaluated in ED.  Offered professional translation services, requests daughter.

## 2018-07-12 NOTE — ED ADULT NURSE NOTE - OBJECTIVE STATEMENT
pts daughter states pt took her bp at home and it was 173/88. She also states she is feeling dizzy and it is the same feeling as the stroke she had in February. pt does take coumadin, amlodipine, enalapril pts daughter states pt took her bp at home and it was 173/88. She also states she is feeling dizzy and it is the same feeling as the stroke she had in February. pt does take coumadin, amlodipine, enalapril. no symptoms of stroke. positive /pushes/pulls

## 2018-07-12 NOTE — ED ADULT TRIAGE NOTE - CHIEF COMPLAINT QUOTE
feeling of "it" shooting into my brain. feels the same as the last time I had a stroke. Blood pressure taken at home and was high.

## 2018-07-13 LAB
HCT VFR BLD CALC: 36.5 % — SIGNIFICANT CHANGE UP (ref 34.5–45)
HGB BLD-MCNC: 10.8 G/DL — LOW (ref 11.5–15.5)
INR BLD: 2.83 RATIO — HIGH (ref 0.88–1.16)
MCHC RBC-ENTMCNC: 19.5 PG — LOW (ref 27–34)
MCHC RBC-ENTMCNC: 29.6 GM/DL — LOW (ref 32–36)
MCV RBC AUTO: 65.8 FL — LOW (ref 80–100)
NRBC # BLD: 0 /100 WBCS — SIGNIFICANT CHANGE UP (ref 0–0)
PLATELET # BLD AUTO: 204 K/UL — SIGNIFICANT CHANGE UP (ref 150–400)
PROTHROM AB SERPL-ACNC: 31.2 SEC — HIGH (ref 9.8–12.7)
RBC # BLD: 5.55 M/UL — HIGH (ref 3.8–5.2)
RBC # FLD: 15.7 % — HIGH (ref 10.3–14.5)
WBC # BLD: 4.39 K/UL — SIGNIFICANT CHANGE UP (ref 3.8–10.5)
WBC # FLD AUTO: 4.39 K/UL — SIGNIFICANT CHANGE UP (ref 3.8–10.5)

## 2018-07-13 PROCEDURE — 93308 TTE F-UP OR LMTD: CPT | Mod: 26

## 2018-07-13 RX ORDER — LISINOPRIL 2.5 MG/1
2.5 TABLET ORAL DAILY
Qty: 0 | Refills: 0 | Status: DISCONTINUED | OUTPATIENT
Start: 2018-07-13 | End: 2018-07-17

## 2018-07-13 RX ORDER — METOPROLOL TARTRATE 50 MG
25 TABLET ORAL
Qty: 0 | Refills: 0 | Status: DISCONTINUED | OUTPATIENT
Start: 2018-07-13 | End: 2018-07-16

## 2018-07-13 RX ADMIN — WARFARIN SODIUM 2 MILLIGRAM(S): 2.5 TABLET ORAL at 21:56

## 2018-07-13 RX ADMIN — AMLODIPINE BESYLATE 5 MILLIGRAM(S): 2.5 TABLET ORAL at 21:56

## 2018-07-13 RX ADMIN — ATORVASTATIN CALCIUM 40 MILLIGRAM(S): 80 TABLET, FILM COATED ORAL at 21:56

## 2018-07-13 RX ADMIN — Medication 10 MILLIGRAM(S): at 05:19

## 2018-07-13 RX ADMIN — Medication 25 MILLIGRAM(S): at 18:14

## 2018-07-13 RX ADMIN — Medication 81 MILLIGRAM(S): at 12:50

## 2018-07-13 RX ADMIN — Medication 100 MILLIGRAM(S): at 21:56

## 2018-07-13 NOTE — CONSULT NOTE ADULT - SUBJECTIVE AND OBJECTIVE BOX
Patient is a 73y old  Female who presents with a chief complaint of Weakness, high blood pressure, chest pain .      HPI:  Patient is 73-year-old with history of hypertension, diastolic congestive heart failure in the past, CVA in 2018, at that time she had a right-sided weakness which has resolved, she has a history of recurrent DVT and she is on long-term anticoagulation, she was admitted with complains of generalized weakness, dizziness and her blood pressure on admission was noted to be elevated. She had also complained of vague chest pain. This pain mostly occurs when she coughs, it is a mild ache localized on the left side of the chest and this pain gets worse when the pressure is applied on the chest wall. She denies any fever or any persistent cough. She denies any exertional chest pain, but dyspnea with mild-to-moderate exertion for several months but no orthopnea, PND or ankle edema. Patient states since her CVA activity is limited and she is undergoing physical therapy and she uses a walker. Currently she is comfortable and is in no acute distress. But she states she has chronic dizziness since a CVA. She also complains that at times her heart is racing after emotional stress. On telemetry she is in normal sinus rhythm with premature ventricular beats.           PAST MEDICAL & SURGICAL HISTORY:  HLD (hyperlipidemia)  CHF (congestive heart failure)  CVA (cerebral vascular accident)  DVT (deep venous thrombosis)  HTN (hypertension)  S/P hysterectomy            MEDICATIONS  (STANDING):  amLODIPine   Tablet 5 milliGRAM(s) Oral at bedtime  aspirin enteric coated 81 milliGRAM(s) Oral daily  atorvastatin 40 milliGRAM(s) Oral at bedtime  lisinopril 2.5 milliGRAM(s) Oral daily  metoprolol tartrate 25 milliGRAM(s) Oral two times a day  sodium chloride 0.9%. 1000 milliLiter(s) (100 mL/Hr) IV Continuous <Continuous>  warfarin 2 milliGRAM(s) Oral daily    MEDICATIONS  (PRN):  acetaminophen   Tablet. 1000 milliGRAM(s) Oral every 8 hours PRN Moderate Pain (4 - 6)  docusate sodium 100 milliGRAM(s) Oral two times a day PRN Constipation  nitroglycerin     SubLingual 0.4 milliGRAM(s) SubLingual every 5 minutes PRN Chest Pain  ondansetron Injectable 4 milliGRAM(s) IV Push every 4 hours PRN Nausea and/or Vomiting      FAMILY HISTORY:  No pertinent family history in first degree relatives      SOCIAL HISTORY:  no history of smoking or any alcohol consumption.    REVIEW OF SYSTEM:  Pertinent items are noted in HPI.  Constitutional	Negative for chills, fevers, sweats.    Eyes: 	Negative for visual disturbance.  Ears, nose, mouth, throat, and face: Negative for epistaxis, nasal congestion.  Neck:	Negative for enlargement, pain and difficulty in swallowing  Respiration : Negative for cough, dyspnea on exertion, pleuritic chest pain and wheezing  Cardiovascular: Notable  for chest pain, dyspnea and palpitations    Gastrointestinal : Negative for abdominal pain, diarrhea, nausea and vomiting  Genitourinary: Negative for dysuria, frequency and urinary incontinence .  Skin: Negative for  rash, pruritus, swelling, dryness .  	  Hematologic/lymphatic: Negative for bleeding and easy bruising  Musculoskeletal: Negative for arthralgias, back pain and muscle weakness.  Neurological: Notable  for dizziness &  headaches No  seizures and tremors  Behavioral/Psych: Negative for mood change, depression.  Endocrine:	Negative for blurry vision, polydipsia and polyuria, diaphoresis.   Allergic/Immunologic:	Negative for anaphylaxis, angioedema and urticaria.      Vital Signs Last 24 Hrs  T(C): 36.5 (2018 05:34), Max: 36.6 (2018 18:08)  T(F): 97.7 (2018 05:34), Max: 97.8 (2018 18:08)  HR: 65 (2018 05:34) (62 - 75)  BP: 119/57 (2018 05:34) (110/79 - 144/72)  BP(mean): --  RR: 18 (2018 05:34) (15 - 18)  SpO2: 98% (2018 05:34) (95% - 100%)      PHYSICAL EXAM  General Appearance: cooperative, no acute distress,   HEENT: PERRL, conjunctiva clear, EOM's intact, non injected pharynx, no exudate .  Neck: Supple, , no adenopathy, thyroid: not enlarged, no carotid bruit or JVD  Lungs: Clear to auscultation bilateral,no adventitious breath sounds, normal   expiratory phase  Heart: Regular rate and rhythm, S1, S2 normal, no murmur, rub or gallop  Abdomen: Soft, non-tender, bowel sounds active , no hepatosplenomegaly  Extremities: no cyanosis or edema, no joint swelling  Skin: Skin color, texture normal, no rashes   Neurologic: Alert and oriented X3 , cranial nerves , mild right sided weakness        INTERPRETATION OF TELEMETRY:  NSR     ECG: NSR LVH NSST changes        LABS:                          11.4   5.50  )-----------( 240      ( 2018 12:27 )             38.1     07-12    137  |  104  |  11  ----------------------------<  114<H>  4.0   |  26  |  0.68    Ca    8.9      2018 12:27    TPro  7.5  /  Alb  3.6  /  TBili  0.4  /  DBili  x   /  AST  23  /  ALT  21  /  AlkPhos  86  07-12    CARDIAC MARKERS ( 2018 19:29 )  <0.015 ng/mL / x     / x     / x     / x      CARDIAC MARKERS ( 2018 16:49 )  <0.015 ng/mL / x     / x     / x     / x      CARDIAC MARKERS ( 2018 12:27 )  <0.015 ng/mL / x     / x     / x     / x              PT/INR - ( 2018 09:19 )   PT: 31.2 sec;   INR: 2.83 ratio         PTT - ( 2018 12:27 )  PTT:36.8 sec  Urinalysis Basic - ( 2018 12:28 )    Color: Yellow / Appearance: Clear / S.010 / pH: x  Gluc: x / Ketone: Negative  / Bili: Negative / Urobili: Negative mg/dL   Blood: x / Protein: Negative mg/dL / Nitrite: Negative   Leuk Esterase: Negative / RBC: x / WBC x   Sq Epi: x / Non Sq Epi: x / Bacteria: x

## 2018-07-13 NOTE — CONSULT NOTE ADULT - ASSESSMENT
Recurrent dizziness since her CVA.  Chest pain probably noncardiac, she has reproducible chest wall tenderness.  Dyspnea on exertion and palpitations the symptoms have been present for several months.  Essential hypertension.  Abnormal EKG.  High cholesterol.   Recurrent DVT and history of CVA patient is on long-term anticoagulation.  Suggest  Observe on telemetry.  Follow-up with CPK, troponin I, EKG.  Due to palpitations start Lopressor 25 mg by mouth twice a day.  Change enalapril to lisinopril 2.5 mg by mouth once daily.  Continue with statins and antiplatelet therapy.  Change warfarin to Eliquis 5 minutes as by mouth twice a day for convenience.  Advised patient to lose weight.  Due to chronic dizziness neurological evaluation.  Will consider pharmacological nuclear stress test.  Gradual ambulation.  Discussed general condition with patient's daughter at bedside.  Discussed with the hospitalist.

## 2018-07-14 LAB
CK SERPL-CCNC: 63 U/L — SIGNIFICANT CHANGE UP (ref 26–192)
INR BLD: 2.61 RATIO — HIGH (ref 0.88–1.16)
PROTHROM AB SERPL-ACNC: 28.7 SEC — HIGH (ref 9.8–12.7)
TROPONIN I SERPL-MCNC: <0.015 NG/ML — SIGNIFICANT CHANGE UP (ref 0.01–0.04)
TROPONIN I SERPL-MCNC: <0.015 NG/ML — SIGNIFICANT CHANGE UP (ref 0.01–0.04)

## 2018-07-14 PROCEDURE — 93880 EXTRACRANIAL BILAT STUDY: CPT | Mod: 26

## 2018-07-14 PROCEDURE — 93010 ELECTROCARDIOGRAM REPORT: CPT

## 2018-07-14 RX ADMIN — Medication 25 MILLIGRAM(S): at 05:52

## 2018-07-14 RX ADMIN — Medication 81 MILLIGRAM(S): at 11:57

## 2018-07-14 RX ADMIN — LISINOPRIL 2.5 MILLIGRAM(S): 2.5 TABLET ORAL at 05:52

## 2018-07-14 RX ADMIN — AMLODIPINE BESYLATE 5 MILLIGRAM(S): 2.5 TABLET ORAL at 21:57

## 2018-07-14 RX ADMIN — Medication 25 MILLIGRAM(S): at 18:28

## 2018-07-14 RX ADMIN — Medication 100 MILLIGRAM(S): at 11:57

## 2018-07-14 RX ADMIN — ATORVASTATIN CALCIUM 40 MILLIGRAM(S): 80 TABLET, FILM COATED ORAL at 21:57

## 2018-07-15 LAB
INR BLD: 2.46 RATIO — HIGH (ref 0.88–1.16)
PROTHROM AB SERPL-ACNC: 27.1 SEC — HIGH (ref 9.8–12.7)
TROPONIN I SERPL-MCNC: <0.015 NG/ML — SIGNIFICANT CHANGE UP (ref 0.01–0.04)

## 2018-07-15 PROCEDURE — 93010 ELECTROCARDIOGRAM REPORT: CPT

## 2018-07-15 RX ORDER — PHYTONADIONE (VIT K1) 5 MG
2.5 TABLET ORAL ONCE
Qty: 0 | Refills: 0 | Status: COMPLETED | OUTPATIENT
Start: 2018-07-15 | End: 2018-07-15

## 2018-07-15 RX ADMIN — LISINOPRIL 2.5 MILLIGRAM(S): 2.5 TABLET ORAL at 05:09

## 2018-07-15 RX ADMIN — ATORVASTATIN CALCIUM 40 MILLIGRAM(S): 80 TABLET, FILM COATED ORAL at 22:14

## 2018-07-15 RX ADMIN — AMLODIPINE BESYLATE 5 MILLIGRAM(S): 2.5 TABLET ORAL at 22:14

## 2018-07-15 RX ADMIN — Medication 81 MILLIGRAM(S): at 13:19

## 2018-07-15 RX ADMIN — Medication 25 MILLIGRAM(S): at 05:09

## 2018-07-15 RX ADMIN — Medication 25 MILLIGRAM(S): at 17:46

## 2018-07-15 RX ADMIN — Medication 2.5 MILLIGRAM(S): at 13:19

## 2018-07-16 LAB
ANION GAP SERPL CALC-SCNC: 4 MMOL/L — LOW (ref 5–17)
BLD GP AB SCN SERPL QL: SIGNIFICANT CHANGE UP
BUN SERPL-MCNC: 18 MG/DL — SIGNIFICANT CHANGE UP (ref 7–23)
CALCIUM SERPL-MCNC: 8.3 MG/DL — LOW (ref 8.5–10.1)
CHLORIDE SERPL-SCNC: 111 MMOL/L — HIGH (ref 96–108)
CO2 SERPL-SCNC: 27 MMOL/L — SIGNIFICANT CHANGE UP (ref 22–31)
CREAT SERPL-MCNC: 0.55 MG/DL — SIGNIFICANT CHANGE UP (ref 0.5–1.3)
GLUCOSE SERPL-MCNC: 78 MG/DL — SIGNIFICANT CHANGE UP (ref 70–99)
HCT VFR BLD CALC: 33.9 % — LOW (ref 34.5–45)
HGB BLD-MCNC: 10.1 G/DL — LOW (ref 11.5–15.5)
INR BLD: 1.41 RATIO — HIGH (ref 0.88–1.16)
MCHC RBC-ENTMCNC: 19.4 PG — LOW (ref 27–34)
MCHC RBC-ENTMCNC: 29.8 GM/DL — LOW (ref 32–36)
MCV RBC AUTO: 65.1 FL — LOW (ref 80–100)
NRBC # BLD: 0 /100 WBCS — SIGNIFICANT CHANGE UP (ref 0–0)
PLATELET # BLD AUTO: 213 K/UL — SIGNIFICANT CHANGE UP (ref 150–400)
POTASSIUM SERPL-MCNC: 3.9 MMOL/L — SIGNIFICANT CHANGE UP (ref 3.5–5.3)
POTASSIUM SERPL-SCNC: 3.9 MMOL/L — SIGNIFICANT CHANGE UP (ref 3.5–5.3)
PROTHROM AB SERPL-ACNC: 15.3 SEC — HIGH (ref 9.8–12.7)
RBC # BLD: 5.21 M/UL — HIGH (ref 3.8–5.2)
RBC # FLD: 15.5 % — HIGH (ref 10.3–14.5)
SODIUM SERPL-SCNC: 142 MMOL/L — SIGNIFICANT CHANGE UP (ref 135–145)
TYPE + AB SCN PNL BLD: SIGNIFICANT CHANGE UP
WBC # BLD: 4.88 K/UL — SIGNIFICANT CHANGE UP (ref 3.8–10.5)
WBC # FLD AUTO: 4.88 K/UL — SIGNIFICANT CHANGE UP (ref 3.8–10.5)

## 2018-07-16 PROCEDURE — 93010 ELECTROCARDIOGRAM REPORT: CPT | Mod: 76

## 2018-07-16 RX ORDER — METOPROLOL TARTRATE 50 MG
25 TABLET ORAL DAILY
Qty: 0 | Refills: 0 | Status: DISCONTINUED | OUTPATIENT
Start: 2018-07-16 | End: 2018-07-17

## 2018-07-16 RX ORDER — WARFARIN SODIUM 2.5 MG/1
2 TABLET ORAL DAILY
Qty: 0 | Refills: 0 | Status: DISCONTINUED | OUTPATIENT
Start: 2018-07-16 | End: 2018-07-17

## 2018-07-16 RX ORDER — CLOPIDOGREL BISULFATE 75 MG/1
75 TABLET, FILM COATED ORAL DAILY
Qty: 0 | Refills: 0 | Status: DISCONTINUED | OUTPATIENT
Start: 2018-07-17 | End: 2018-07-17

## 2018-07-16 RX ORDER — WARFARIN SODIUM 2.5 MG/1
2 TABLET ORAL DAILY
Qty: 0 | Refills: 0 | Status: DISCONTINUED | OUTPATIENT
Start: 2018-07-16 | End: 2018-07-16

## 2018-07-16 RX ORDER — SODIUM CHLORIDE 9 MG/ML
1000 INJECTION INTRAMUSCULAR; INTRAVENOUS; SUBCUTANEOUS
Qty: 0 | Refills: 0 | Status: DISCONTINUED | OUTPATIENT
Start: 2018-07-16 | End: 2018-07-17

## 2018-07-16 RX ADMIN — Medication 25 MILLIGRAM(S): at 06:12

## 2018-07-16 RX ADMIN — Medication 1000 MILLIGRAM(S): at 23:30

## 2018-07-16 RX ADMIN — WARFARIN SODIUM 2 MILLIGRAM(S): 2.5 TABLET ORAL at 21:06

## 2018-07-16 RX ADMIN — LISINOPRIL 2.5 MILLIGRAM(S): 2.5 TABLET ORAL at 06:13

## 2018-07-16 RX ADMIN — Medication 25 MILLIGRAM(S): at 18:25

## 2018-07-16 RX ADMIN — ATORVASTATIN CALCIUM 40 MILLIGRAM(S): 80 TABLET, FILM COATED ORAL at 21:06

## 2018-07-16 RX ADMIN — AMLODIPINE BESYLATE 5 MILLIGRAM(S): 2.5 TABLET ORAL at 21:06

## 2018-07-16 RX ADMIN — Medication 30 MILLILITER(S): at 22:55

## 2018-07-16 RX ADMIN — Medication 1000 MILLIGRAM(S): at 22:53

## 2018-07-16 RX ADMIN — Medication 81 MILLIGRAM(S): at 17:14

## 2018-07-16 NOTE — CHART NOTE - NSCHARTNOTEFT_GEN_A_CORE
Nurse Practitioner Progress note:     HPI:  This is a pleasant 73 y.o F PMH of HTN, diastolic CHF, CVA (2/2018) with resolved right sided weakness, DVT in LLE on Coumadin brought to the ED by family for concerns of headache, high blood pressure and generalized weakness that patient was concerned she may have another stroke. She reports similar symptoms to prior CVA. Since Feb 2018 patient has been seen in the ED twice with these concerns including elevated BP at home then discharged after negative workup. Per family, patient has not been eating well at home and today was found to have 's which worried patient that she may have a stroke.   In the ED, no focal deficits found, CBC and CMP unremarkable. Denied headache but then endorsed having chest pressure yesterday to ED physician prompting EKG which revealed new T wave inversions in the lateral leads.   Pt referred for LHC with possible intervention.    s/p LHC : JOSE ANTONIO to LCX  Pt denies chest pain/SOB/palpitations.    PHYSICAL EXAM:  V/S:  /70          HR    68     RR 16  Neurologic: Non-focal, A&Ox3.  No neuro deficits  Cardiac : (+)S1S2,RRR  Lungs: CTA B/L  Procedure Site: Rt. radial hemoband device noted. site benign soft no bleeding no hematoma 2+radial pulses    PLAN: 	  -monitor pt in CICU  -VS, labs, diet, activity as per PCI orders  -hemoband to be removed at 14:00  -IV hydration  -Encourage PO fluids  -continue ASA/plavix/b-blocker/statin  -may resume anticoagulation tonight  -Plan of care D/W pt. and Dr. Lira  -Discussed therapeutic lifestyle changes to reduce risk factors such as following a cardiac diet, weight loss, maintaining a healthy weight, exercise, medication compliance, and regular follow-up  with MD to know our numbers (BP, cholesterol, weight, and glucose)  - Follow-up AM labs/EKG/site check  -Follow-up with attending in 1-2 weeks upon discharge.

## 2018-07-16 NOTE — PACU DISCHARGE NOTE - COMMENTS
patient discharged to 336-1. right Radial site benign, soft nontender, dressing clean/dry/intact. IVL site benign. Patient without any complaints. Vital signs. post procedure instructions given and understood by patient and daughter. Will continue to monitor patient status. Report given to ELLE Blanco

## 2018-07-17 ENCOUNTER — TRANSCRIPTION ENCOUNTER (OUTPATIENT)
Age: 73
End: 2018-07-17

## 2018-07-17 VITALS — WEIGHT: 195.33 LBS

## 2018-07-17 LAB
ANION GAP SERPL CALC-SCNC: 5 MMOL/L — SIGNIFICANT CHANGE UP (ref 5–17)
BUN SERPL-MCNC: 18 MG/DL — SIGNIFICANT CHANGE UP (ref 7–23)
CALCIUM SERPL-MCNC: 8.3 MG/DL — LOW (ref 8.5–10.1)
CHLORIDE SERPL-SCNC: 112 MMOL/L — HIGH (ref 96–108)
CO2 SERPL-SCNC: 26 MMOL/L — SIGNIFICANT CHANGE UP (ref 22–31)
CREAT SERPL-MCNC: 0.56 MG/DL — SIGNIFICANT CHANGE UP (ref 0.5–1.3)
GLUCOSE SERPL-MCNC: 81 MG/DL — SIGNIFICANT CHANGE UP (ref 70–99)
HCT VFR BLD CALC: 34.3 % — LOW (ref 34.5–45)
HGB BLD-MCNC: 10.3 G/DL — LOW (ref 11.5–15.5)
INR BLD: 1.18 RATIO — HIGH (ref 0.88–1.16)
MCHC RBC-ENTMCNC: 19.7 PG — LOW (ref 27–34)
MCHC RBC-ENTMCNC: 30 GM/DL — LOW (ref 32–36)
MCV RBC AUTO: 65.6 FL — LOW (ref 80–100)
NRBC # BLD: 0 /100 WBCS — SIGNIFICANT CHANGE UP (ref 0–0)
PLATELET # BLD AUTO: 203 K/UL — SIGNIFICANT CHANGE UP (ref 150–400)
POTASSIUM SERPL-MCNC: 3.8 MMOL/L — SIGNIFICANT CHANGE UP (ref 3.5–5.3)
POTASSIUM SERPL-SCNC: 3.8 MMOL/L — SIGNIFICANT CHANGE UP (ref 3.5–5.3)
PROTHROM AB SERPL-ACNC: 12.8 SEC — HIGH (ref 9.8–12.7)
RBC # BLD: 5.23 M/UL — HIGH (ref 3.8–5.2)
RBC # FLD: 15.4 % — HIGH (ref 10.3–14.5)
SODIUM SERPL-SCNC: 143 MMOL/L — SIGNIFICANT CHANGE UP (ref 135–145)
WBC # BLD: 5.56 K/UL — SIGNIFICANT CHANGE UP (ref 3.8–10.5)
WBC # FLD AUTO: 5.56 K/UL — SIGNIFICANT CHANGE UP (ref 3.8–10.5)

## 2018-07-17 PROCEDURE — 93010 ELECTROCARDIOGRAM REPORT: CPT | Mod: 76

## 2018-07-17 RX ORDER — ASPIRIN/CALCIUM CARB/MAGNESIUM 324 MG
325 TABLET ORAL DAILY
Qty: 0 | Refills: 0 | Status: DISCONTINUED | OUTPATIENT
Start: 2018-07-17 | End: 2018-07-17

## 2018-07-17 RX ORDER — CLOPIDOGREL BISULFATE 75 MG/1
1 TABLET, FILM COATED ORAL
Qty: 30 | Refills: 0
Start: 2018-07-17 | End: 2018-08-15

## 2018-07-17 RX ORDER — PANTOPRAZOLE SODIUM 20 MG/1
40 TABLET, DELAYED RELEASE ORAL
Qty: 0 | Refills: 0 | Status: DISCONTINUED | OUTPATIENT
Start: 2018-07-17 | End: 2018-07-17

## 2018-07-17 RX ORDER — APIXABAN 2.5 MG/1
1 TABLET, FILM COATED ORAL
Qty: 60 | Refills: 0
Start: 2018-07-17 | End: 2018-08-15

## 2018-07-17 RX ORDER — APIXABAN 2.5 MG/1
5 TABLET, FILM COATED ORAL EVERY 12 HOURS
Qty: 0 | Refills: 0 | Status: DISCONTINUED | OUTPATIENT
Start: 2018-07-17 | End: 2018-07-17

## 2018-07-17 RX ORDER — METOPROLOL TARTRATE 50 MG
1 TABLET ORAL
Qty: 30 | Refills: 0
Start: 2018-07-17 | End: 2018-08-15

## 2018-07-17 RX ORDER — LISINOPRIL 2.5 MG/1
1 TABLET ORAL
Qty: 30 | Refills: 0
Start: 2018-07-17 | End: 2018-08-15

## 2018-07-17 RX ADMIN — LISINOPRIL 2.5 MILLIGRAM(S): 2.5 TABLET ORAL at 06:14

## 2018-07-17 RX ADMIN — Medication 25 MILLIGRAM(S): at 06:14

## 2018-07-17 RX ADMIN — CLOPIDOGREL BISULFATE 75 MILLIGRAM(S): 75 TABLET, FILM COATED ORAL at 10:54

## 2018-07-17 NOTE — DISCHARGE NOTE ADULT - PATIENT PORTAL LINK FT
You can access the [facility], offered by [facility], by registering with the following website: [email or web address]

## 2018-07-17 NOTE — DISCHARGE NOTE ADULT - PLAN OF CARE
s/p PCI s/p PCI and JOSE ANTONIO to LCX  low salt, DASH  diet  loose weight  coumadin switched to Eliquis for DVT as per Dr. Lynne  f/u with Dr. Lynne

## 2018-07-17 NOTE — DISCHARGE NOTE ADULT - MEDICATION SUMMARY - MEDICATIONS TO STOP TAKING
I will STOP taking the medications listed below when I get home from the hospital:    Coumadin 2 mg oral tablet  -- 1 tab(s) by mouth once a day    enalapril 10 mg oral tablet  -- 1 tab(s) by mouth once a day    Aspir-Low 81 mg oral delayed release tablet  -- 1 tab(s) by mouth once a day   -- Swallow whole.  Do not crush.  Take with food or milk.

## 2018-07-17 NOTE — PROGRESS NOTE ADULT - SUBJECTIVE AND OBJECTIVE BOX
HPI:  Patient is 73-year-old with history of hypertension, diastolic congestive heart failure in the past, CVA in February 2018, at that time she had a right-sided weakness which has resolved, she has a history of recurrent DVT and she is on long-term anticoagulation, she was admitted with complains of generalized weakness, dizziness and her blood pressure on admission was noted to be elevated. She was admitted with complains of generalized fatigue, dyspnea on exertion and vague chest pain. Since admission she has been comfortable at rest but she gets short of breath and fatigue with mild exertion. She denies any cough, fever, orthopnea or PND. She is in normal sinus rhythm. Her EKG today is abnormal. She has a new EKG changes in the anterior lateral leads.  She also has  episodes of sinus bradycardia. Family at bedside.         Transthoracic Echocardiogram Follow Up (07.13.18    Summary     The left ventricle is normal in size, wall thickness, wall motion and   contractility .   Estimated left ventricular ejection fraction is 60-65 %.   Trace pericardial effusion is present. No echo evidence of tamponade   No evidence of pleural effusion.   limited study without doppler      PAST MEDICAL & SURGICAL HISTORY:  HLD (hyperlipidemia)  CHF (congestive heart failure)  CVA (cerebral vascular accident)  DVT (deep venous thrombosis)  HTN (hypertension)  S/P hysterectomy        MEDICATIONS  (STANDING):  amLODIPine   Tablet 5 milliGRAM(s) Oral at bedtime  aspirin enteric coated 81 milliGRAM(s) Oral daily  atorvastatin 40 milliGRAM(s) Oral at bedtime  lisinopril 2.5 milliGRAM(s) Oral daily  metoprolol succinate ER 25 milliGRAM(s) Oral daily  sodium chloride 0.9%. 1000 milliLiter(s) (100 mL/Hr) IV Continuous <Continuous>    MEDICATIONS  (PRN):  acetaminophen   Tablet. 1000 milliGRAM(s) Oral every 8 hours PRN Moderate Pain (4 - 6)  docusate sodium 100 milliGRAM(s) Oral two times a day PRN Constipation  nitroglycerin     SubLingual 0.4 milliGRAM(s) SubLingual every 5 minutes PRN Chest Pain  ondansetron Injectable 4 milliGRAM(s) IV Push every 4 hours PRN Nausea and/or Vomiting        REVIEW OF SYSTEM:  Pertinent items are noted in HPI.  Constitutional	Negative for chills, fevers, sweats.    Eyes: 	Negative for visual disturbance.  Ears, nose, mouth, throat, and face: Negative for epistaxis, nasal congestion, sore throat .  Neck:	Negative for enlargement, pain and difficulty in swallowing  Respiration : Negative for cough, pleuritic chest pain and wheezing , she has   Cardiovascular: Negative for chest pain  and palpitations , she has MONTILLA.    Gastrointestinal : Negative for abdominal pain, diarrhea, nausea and vomiting  Genitourinary: Negative for dysuria, frequency and urinary incontinence .  Skin: Negative for  rash, pruritus, swelling, dryness .  	  Hematologic/lymphatic: Negative for bleeding and easy bruising  Musculoskeletal: Negative for arthralgias, back pain and muscle weakness.  Neurological: Negative for dizziness, headaches, seizures and tremors  Behavioral/Psych: Negative for mood change, depression.  Endocrine:	Negative for blurry vision, polydipsia and polyuria, diaphoresis.   Allergic/Immunologic:	Negative for anaphylaxis, angioedema and urticaria.      Vital Signs Last 24 Hrs  T(C): 36.7 (16 Jul 2018 05:05), Max: 37 (15 Jul 2018 21:16)  T(F): 98 (16 Jul 2018 05:05), Max: 98.6 (15 Jul 2018 21:16)  HR: 59 (16 Jul 2018 07:45) (50 - 67)  BP: 103/56 (16 Jul 2018 05:05) (103/56 - 129/85)  BP(mean): --  RR: 18 (16 Jul 2018 05:05) (16 - 18)  SpO2: 94% (16 Jul 2018 05:05) (93% - 99%)    I&O's Summary    PHYSICAL EXAM  General Appearance: cooperative, no acute distress,   HEENT: PERRL, conjunctiva clear, EOM's intact, non injected pharynx, no exudate, TM   normal  Neck: Supple, , no adenopathy, thyroid: not enlarged, no carotid bruit or JVD  Back: Symmetric, no  tenderness,no soft tissue tenderness  Lungs: Clear to auscultation bilateral,no adventitious breath sounds, normal   expiratory phase  Heart: Regular rate and rhythm, S1, S2 normal, no murmur, rub or gallop  Abdomen: Soft, non-tender, bowel sounds active , no hepatosplenomegaly  Extremities: no cyanosis or edema, no joint swelling  Skin: Skin color, texture normal, no rashes   Neurologic: Alert and oriented X3 , cranial nerves intact, sensory and motor normal,        INTERPRETATION OF TELEMETRY: Sinus bradycardia            LABS:                          10.1   4.88  )-----------( 213      ( 16 Jul 2018 06:50 )             33.9     07-16    142  |  111<H>  |  18  ----------------------------<  78  3.9   |  27  |  0.55    Ca    8.3<L>      16 Jul 2018 06:50      CARDIAC MARKERS ( 15 Jul 2018 01:37 )  <0.015 ng/mL / x     / x     / x     / x      CARDIAC MARKERS ( 14 Jul 2018 19:47 )  <0.015 ng/mL / x     / x     / x     / x      CARDIAC MARKERS ( 14 Jul 2018 14:22 )  <0.015 ng/mL / x     / 63 U/L / x     / x              PT/INR - ( 16 Jul 2018 06:50 )   PT: 15.3 sec;   INR: 1.41 ratio
Cardiology NP     Patient is a 73y old  Female who presents with a chief complaint of Weakness, high blood pressure, chest pressure (12 Jul 2018 15:08)      HPI:  This is a pleasant 73 y.o F PMH of HTN, diastolic CHF, CVA (2/2018) with resolved right sided weakness, DVT in LLE on Coumadin brought to the ED by family for concerns of headache, high blood pressure and generalized weakness that patient was concerned she may have another stroke.                         PAST MEDICAL & SURGICAL HISTORY:  HLD (hyperlipidemia)  CHF (congestive heart failure)  CVA (cerebral vascular accident)  DVT (deep venous thrombosis)  HTN (hypertension)  S/P hysterectomy      MEDICATIONS  (STANDING):  amLODIPine   Tablet 5 milliGRAM(s) Oral at bedtime  aspirin enteric coated 81 milliGRAM(s) Oral daily  atorvastatin 40 milliGRAM(s) Oral at bedtime  clopidogrel Tablet 75 milliGRAM(s) Oral daily  lisinopril 2.5 milliGRAM(s) Oral daily  metoprolol succinate ER 25 milliGRAM(s) Oral daily  sodium chloride 0.9%. 1000 milliLiter(s) (75 mL/Hr) IV Continuous <Continuous>  sodium chloride 0.9%. 1000 milliLiter(s) (100 mL/Hr) IV Continuous <Continuous>  warfarin 2 milliGRAM(s) Oral daily    MEDICATIONS  (PRN):  acetaminophen   Tablet. 1000 milliGRAM(s) Oral every 8 hours PRN Moderate Pain (4 - 6)  aluminum hydroxide/magnesium hydroxide/simethicone Suspension 30 milliLiter(s) Oral every 6 hours PRN Dyspepsia  docusate sodium 100 milliGRAM(s) Oral two times a day PRN Constipation  nitroglycerin     SubLingual 0.4 milliGRAM(s) SubLingual every 5 minutes PRN Chest Pain  ondansetron Injectable 4 milliGRAM(s) IV Push every 4 hours PRN Nausea and/or Vomiting      Allergies    penicillin (Rash)    Intolerances        REVIEW OF SYSTEMS: As mentioned in HPI all others Negative     Vital Signs Last 24 Hrs  T(C): 36.1 (17 Jul 2018 06:06), Max: 36.7 (16 Jul 2018 20:32)  T(F): 97 (17 Jul 2018 06:06), Max: 98 (16 Jul 2018 20:32)  HR: 59 (17 Jul 2018 06:00) (53 - 69)  BP: 113/61 (17 Jul 2018 06:00) (100/50 - 151/62)  BP(mean): 74 (17 Jul 2018 06:00) (56 - 84)  RR: 16 (17 Jul 2018 06:00) (14 - 21)  SpO2: 97% (16 Jul 2018 22:00) (96% - 100%)    PHYSICAL EXAM:  NERVOUS SYSTEM:  Alert & Oriented X3, Good concentration  CHEST/LUNG: Clear to auscultation bilaterally; No rales, rhonchi, wheezing, or rubs  HEART: Regular rate and rhythm; No murmurs, rubs, or gallops  ABDOMEN: Soft, Nontender, Nondistended; Bowel sounds present  EXTREMITIES:  2+ Peripheral Pulses, No clubbing, cyanosis, or edema  SKIN: right radial cath site without bleeding or hematoma    LABS:                        10.3   5.56  )-----------( 203      ( 17 Jul 2018 05:04 )             34.3     07-17    143  |  112<H>  |  18  ----------------------------<  81  3.8   |  26  |  0.56    Ca    8.3<L>      17 Jul 2018 05:04      PT/INR - ( 17 Jul 2018 05:04 )   PT: 12.8 sec;   INR: 1.18 ratio
HPI:  Patient is 73-year-old with history of hypertension, diastolic congestive heart failure in the past, CVA in 2018, at that time she had a right-sided weakness which has resolved, she has a history of recurrent DVT and she is on long-term anticoagulation, she was admitted with complains of generalized weakness, dizziness and her blood pressure on admission was noted to be elevated. She was admitted with complains of generalized fatigue, dyspnea on exertion and vague chest pain. Since admission she has been comfortable at rest but she gets short of breath and fatigue with mild exertion. She denies any cough, fever, orthopnea or PND. She is in normal sinus rhythm. Her EKG today is abnormal. She has a new EKG changes in the anterior lateral leads.         Transthoracic Echocardiogram Follow Up (18    Summary     The left ventricle is normal in size, wall thickness, wall motion and   contractility .   Estimated left ventricular ejection fraction is 60-65 %.   Trace pericardial effusion is present. No echo evidence of tamponade   No evidence of pleural effusion.   limited study without doppler          PAST MEDICAL & SURGICAL HISTORY:  HLD (hyperlipidemia)  CHF (congestive heart failure)  CVA (cerebral vascular accident)  DVT (deep venous thrombosis)  HTN (hypertension)  S/P hysterectomy          MEDICATIONS  (STANDING):  amLODIPine   Tablet 5 milliGRAM(s) Oral at bedtime  aspirin enteric coated 81 milliGRAM(s) Oral daily  atorvastatin 40 milliGRAM(s) Oral at bedtime  lisinopril 2.5 milliGRAM(s) Oral daily  metoprolol tartrate 25 milliGRAM(s) Oral two times a day  sodium chloride 0.9%. 1000 milliLiter(s) (100 mL/Hr) IV Continuous <Continuous>  warfarin 2 milliGRAM(s) Oral daily    MEDICATIONS  (PRN):  acetaminophen   Tablet. 1000 milliGRAM(s) Oral every 8 hours PRN Moderate Pain (4 - 6)  docusate sodium 100 milliGRAM(s) Oral two times a day PRN Constipation  nitroglycerin     SubLingual 0.4 milliGRAM(s) SubLingual every 5 minutes PRN Chest Pain  ondansetron Injectable 4 milliGRAM(s) IV Push every 4 hours PRN Nausea and/or Vomiting          REVIEW OF SYSTEM:  Pertinent items are noted in HPI.  Constitutional	Negative for chills, fevers, sweats.    Eyes: 	Negative for visual disturbance.  Ears, nose, mouth, throat, and face: Negative for epistaxis, nasal congestion, sore throat and tinnitus.  Neck:	Negative for enlargement, pain and difficulty in swallowing  Respiration : Negative for cough,  pleuritic chest pain and wheezing  Cardiovascular: Negative for chest pain,  and palpitations , she has dyspnea on exertion.    Gastrointestinal : Negative for abdominal pain, diarrhea, nausea and vomiting  Genitourinary: Negative for dysuria, frequency and urinary incontinence .  Skin: Negative for  rash, pruritus, swelling, dryness .  	  Hematologic/lymphatic: Negative for bleeding and easy bruising  Musculoskeletal: Negative for arthralgias, back pain and muscle weakness.  Neurological: Negative for dizziness, headaches, seizures and tremors  Behavioral/Psych: Negative for mood change, depression.  Endocrine:	Negative for blurry vision, polydipsia and polyuria, diaphoresis.   Allergic/Immunologic:	Negative for anaphylaxis, angioedema and urticaria.      Vital Signs Last 24 Hrs  T(C): 36.7 (2018 05:34), Max: 36.8 (2018 13:26)  T(F): 98 (2018 05:34), Max: 98.2 (2018 13:26)  HR: 61 (2018 21:55) (61 - 67)  BP: 139/59 (2018 21:55) (115/56 - 141/61)  BP(mean): 67 (2018 13:26) (67 - 67)  RR: 18 (2018 19:56) (18 - 18)  SpO2: 98% (2018 05:34) (97% - 100%)    I&O's Summary    PHYSICAL EXAM  General Appearance: cooperative, no acute distress,   HEENT: PERRL, conjunctiva clear, EOM's intact, non injected pharynx, no exudate .l  Neck: Supple, , no adenopathy, thyroid: not enlarged, no carotid bruit or JVD  Back: Symmetric, no  tenderness,no soft tissue tenderness  Lungs: Clear to auscultation bilateral,no adventitious breath sounds, normal   expiratory phase  Heart: Regular rate and rhythm, S1, S2 normal, no murmur, rub or gallop  Abdomen: Soft, non-tender, bowel sounds active , no hepatosplenomegaly  Extremities: no cyanosis or edema, no joint swelling  Skin: Skin color, texture normal, no rashes   Neurologic: Alert and oriented X3 , cranial nerves intact, sensory and motor normal,        INTERPRETATION OF TELEMETRY: NSR    ECG: NSR left ventricular  hypertrophy nonspecific ST-T changes in the anterior lateral leads, these changes are new        LABS:                          10.8   4.39  )-----------( 204      ( 2018 09:19 )             36.5     07-12    137  |  104  |  11  ----------------------------<  114<H>  4.0   |  26  |  0.68    Ca    8.9      2018 12:27    TPro  7.5  /  Alb  3.6  /  TBili  0.4  /  DBili  x   /  AST  23  /  ALT  21  /  AlkPhos  86  07-12    CARDIAC MARKERS ( 2018 19:29 )  <0.015 ng/mL / x     / x     / x     / x      CARDIAC MARKERS ( 2018 16:49 )  <0.015 ng/mL / x     / x     / x     / x      CARDIAC MARKERS ( 2018 12:27 )  <0.015 ng/mL / x     / x     / x     / x              PT/INR - ( 2018 05:47 )   PT: 28.7 sec;   INR: 2.61 ratio         PTT - ( 2018 12:27 )  PTT:36.8 sec  Urinalysis Basic - ( 2018 12:28 )    Color: Yellow / Appearance: Clear / S.010 / pH: x  Gluc: x / Ketone: Negative  / Bili: Negative / Urobili: Negative mg/dL   Blood: x / Protein: Negative mg/dL / Nitrite: Negative   Leuk Esterase: Negative / RBC: x / WBC x   Sq Epi: x / Non Sq Epi: x / Bacteria: x
HPI:  Patient is 73-year-old with history of hypertension, diastolic congestive heart failure in the past, CVA in February 2018, at that time she had a right-sided weakness which has resolved, she has a history of recurrent DVT and she is on long-term anticoagulation, she was admitted with complains of generalized weakness, dizziness and her blood pressure on admission was noted to be elevated. She was admitted with complains of generalized fatigue, dyspnea on exertion and vague chest pain. She is status post cardiac catheterization and she was diagnosed to have significant proximal left circumflex disease. She is status post stent placement. She feels better and is now comfortable. No further chest pain or shortness of breath. She is in normal sinus rhythm on telemetry. Her daughter is at bedside. She also suffers from GERD and has cough and heartburn at night when she sleeps.       Cardiac Cath Lab - Adult (07.16.18    Angiographic Findings     Cardiac Arteries and Lesion Findings    LMCA: Normal.    LAD: Large caliber vessel. Minor irregularities.    LCx:     Prox CX: 90% stenosis reduced to 1%.Pre procedure GLADYS III flow was   noted.   The lesion was diagnosed as a moderate risk lesion.The lesion was   discrete   and eccentric.     Post Procedure GLADYS III flow was present.     Treatment results:Interventional treatment was successful.     Comments:Distal vessel large sized, no disease.    RCA: Large caliber vessel. Minor irregularities.    Ramus: Large caliber vessel. Minor irregularities.    LV function assessed as:Normal.  Ejection Fraction  +----------------------------------------------------------------------+---  +  !Method                                                                  !EF%!  +----------------------------------------------------------------------+---  +  !LV gram                                                                 !60 !  +----------------------------------------------------------------------+---  +    VA  Ventriculography Findings:  Normalleft ventricular systolic function.  Left ventricular cavity size is normal.     Impression     Diagnostic Conclusions     One Vessel coronary artery disease (LCx) .   Normal LV systolic function. Estimated LV ejection fraction is 60 %.   No mitral valve stenosis.             Transthoracic Echocardiogram Follow Up (07.13.18    Summary     The left ventricle is normal in size, wall thickness, wall motion and   contractility .   Estimated left ventricular ejection fraction is 60-65 %.   Trace pericardial effusion is present. No echo evidence of tamponade   No evidence of pleural effusion.   limited study without doppler      PAST MEDICAL & SURGICAL HISTORY:  HLD (hyperlipidemia)  CHF (congestive heart failure)  CVA (cerebral vascular accident)  DVT (deep venous thrombosis)  HTN (hypertension)  S/P hysterectomy            PREVIOUS DIAGNOSTIC TESTING:      ECHO  FINDINGS:    STRESS  FINDINGS:    CATHETERIZATION  FINDINGS:    MEDICATIONS  (STANDING):  amLODIPine   Tablet 5 milliGRAM(s) Oral at bedtime  apixaban 5 milliGRAM(s) Oral every 12 hours  aspirin enteric coated 325 milliGRAM(s) Oral daily  atorvastatin 40 milliGRAM(s) Oral at bedtime  clopidogrel Tablet 75 milliGRAM(s) Oral daily  lisinopril 2.5 milliGRAM(s) Oral daily  metoprolol succinate ER 25 milliGRAM(s) Oral daily  pantoprazole    Tablet 40 milliGRAM(s) Oral before breakfast  sodium chloride 0.9%. 1000 milliLiter(s) (75 mL/Hr) IV Continuous <Continuous>  sodium chloride 0.9%. 1000 milliLiter(s) (100 mL/Hr) IV Continuous <Continuous>    MEDICATIONS  (PRN):  acetaminophen   Tablet. 1000 milliGRAM(s) Oral every 8 hours PRN Moderate Pain (4 - 6)  aluminum hydroxide/magnesium hydroxide/simethicone Suspension 30 milliLiter(s) Oral every 6 hours PRN Dyspepsia  docusate sodium 100 milliGRAM(s) Oral two times a day PRN Constipation  nitroglycerin     SubLingual 0.4 milliGRAM(s) SubLingual every 5 minutes PRN Chest Pain  ondansetron Injectable 4 milliGRAM(s) IV Push every 4 hours PRN Nausea and/or Vomiting        REVIEW OF SYSTEM:  Pertinent items are noted in HPI.  Constitutional	Negative for chills, fevers, sweats.    Eyes: 	Negative for visual disturbance.  Ears, nose, mouth, throat, and face: Negative for epistaxis .  Neck:	Negative for enlargement, pain and difficulty in swallowing  Respiration : Negative for cough, dyspnea on exertion, pleuritic chest pain and wheezing  Cardiovascular: Negative for chest pain, dyspnea and palpitations    Gastrointestinal : Negative for abdominal pain, diarrhea, nausea and vomiting  Genitourinary: Negative for dysuria, frequency and urinary incontinence .  Skin: Negative for  rash, pruritus, swelling, dryness .  	  Hematologic/lymphatic: Negative for bleeding and easy bruising  Musculoskeletal: Negative for arthralgias, back pain and muscle weakness.  Neurological: Negative for dizziness, headaches, seizures and tremors  Behavioral/Psych: Negative for mood change, depression.  Endocrine:	Negative for blurry vision, polydipsia and polyuria, diaphoresis.   Allergic/Immunologic:	Negative for anaphylaxis, angioedema and urticaria.      Vital Signs Last 24 Hrs  T(C): 36.1 (17 Jul 2018 06:06), Max: 36.7 (16 Jul 2018 20:32)  T(F): 97 (17 Jul 2018 06:06), Max: 98 (16 Jul 2018 20:32)  HR: 55 (17 Jul 2018 09:00) (50 - 69)  BP: 115/50 (17 Jul 2018 08:00) (100/50 - 151/62)  BP(mean): 65 (17 Jul 2018 08:00) (56 - 84)  RR: 17 (17 Jul 2018 09:00) (14 - 21)  SpO2: 97% (16 Jul 2018 22:00) (96% - 100%)    I&O's Summary    16 Jul 2018 07:01  -  17 Jul 2018 07:00  --------------------------------------------------------  IN: 540 mL / OUT: 2 mL / NET: 538 mL      PHYSICAL EXAM  General Appearance: cooperative, no acute distress,   HEENT: PERRL, conjunctiva clear, EOM's intact .  Neck: Supple, , no adenopathy, thyroid: not enlarged, no carotid bruit or JVD    Lungs: Clear to auscultation bilateral,no adventitious breath sounds, normal   expiratory phase  Heart: Regular rate and rhythm, S1, S2 normal, no murmur, rub or gallop  Abdomen: Soft, non-tender, bowel sounds active , no hepatosplenomegaly  Extremities: no cyanosis or edema, no joint swelling right wrist normal.  Skin: Skin color, texture normal, no rashes   Neurologic: Alert and oriented X3 , cranial nerves intact, sensory and motor normal,        INTERPRETATION OF TELEMETRY: NSR          LABS:                          10.3   5.56  )-----------( 203      ( 17 Jul 2018 05:04 )             34.3     07-17    143  |  112<H>  |  18  ----------------------------<  81  3.8   |  26  |  0.56    Ca    8.3<L>      17 Jul 2018 05:04              PT/INR - ( 17 Jul 2018 05:04 )   PT: 12.8 sec;   INR: 1.18 ratio                   RADIOLOGY & ADDITIONAL STUDIES:
HPI:  Patient is 73-year-old with history of hypertension, diastolic congestive heart failure in the past, CVA in February 2018, at that time she had a right-sided weakness which has resolved, she has a history of recurrent DVT and she is on long-term anticoagulation, she was admitted with complains of generalized weakness, dizziness and her blood pressure on admission was noted to be elevated. She was admitted with complains of generalized fatigue, dyspnea on exertion and vague chest pain. Since admission she has been comfortable at rest but she gets short of breath and fatigue with mild exertion. She denies any cough, fever, orthopnea or PND. She is in normal sinus rhythm. Her EKG today is abnormal. She has a new EKG changes in the anterior lateral leads. Family at bedside.         Transthoracic Echocardiogram Follow Up (07.13.18    Summary     The left ventricle is normal in size, wall thickness, wall motion and   contractility .   Estimated left ventricular ejection fraction is 60-65 %.   Trace pericardial effusion is present. No echo evidence of tamponade   No evidence of pleural effusion.   limited study without doppler          PAST MEDICAL & SURGICAL HISTORY:  HLD (hyperlipidemia)  CHF (congestive heart failure)  CVA (cerebral vascular accident)  DVT (deep venous thrombosis)  HTN (hypertension)  S/P hysterectomy    PAST MEDICAL & SURGICAL HISTORY:  HLD (hyperlipidemia)  CHF (congestive heart failure)  CVA (cerebral vascular accident)  DVT (deep venous thrombosis)  HTN (hypertension)  S/P hysterectomy        MEDICATIONS  (STANDING):  amLODIPine   Tablet 5 milliGRAM(s) Oral at bedtime  aspirin enteric coated 81 milliGRAM(s) Oral daily  atorvastatin 40 milliGRAM(s) Oral at bedtime  lisinopril 2.5 milliGRAM(s) Oral daily  metoprolol tartrate 25 milliGRAM(s) Oral two times a day  sodium chloride 0.9%. 1000 milliLiter(s) (100 mL/Hr) IV Continuous <Continuous>    MEDICATIONS  (PRN):  acetaminophen   Tablet. 1000 milliGRAM(s) Oral every 8 hours PRN Moderate Pain (4 - 6)  docusate sodium 100 milliGRAM(s) Oral two times a day PRN Constipation  nitroglycerin     SubLingual 0.4 milliGRAM(s) SubLingual every 5 minutes PRN Chest Pain  ondansetron Injectable 4 milliGRAM(s) IV Push every 4 hours PRN Nausea and/or Vomiting          REVIEW OF SYSTEM:  Pertinent items are noted in HPI.  Constitutional	Negative for chills, fevers, sweats.    Eyes: 	Negative for visual disturbance.  Ears, nose, mouth, throat, and face: Negative for epistaxis, nasal congestion, sore throat and tinnitus.  Neck:	Negative for enlargement, pain and difficulty in swallowing  Respiration : Negative for cough,  pleuritic chest pain and wheezing  Cardiovascular: Negative for chest pain,  and palpitations , she has MONTILLA & fatigue.    Gastrointestinal : Negative for abdominal pain, diarrhea, nausea and vomiting  Genitourinary: Negative for dysuria, frequency and urinary incontinence .  Skin: Negative for  rash, pruritus, swelling, dryness .  	  Hematologic/lymphatic: Negative for bleeding and easy bruising  Musculoskeletal: Negative for arthralgias, back pain and muscle weakness.  Neurological: Negative for dizziness, headaches, seizures and tremors  Behavioral/Psych: Negative for mood change, depression.  Endocrine:	Negative for blurry vision, polydipsia and polyuria, diaphoresis.   Allergic/Immunologic:	Negative for anaphylaxis, angioedema and urticaria.      Vital Signs Last 24 Hrs  T(C): 36.4 (15 Jul 2018 04:16), Max: 36.7 (14 Jul 2018 21:48)  T(F): 97.5 (15 Jul 2018 04:16), Max: 98 (14 Jul 2018 21:48)  HR: 67 (15 Jul 2018 17:34) (39 - 67)  BP: 129/85 (15 Jul 2018 17:34) (112/54 - 138/67)  BP(mean): --  RR: 18 (15 Jul 2018 17:34) (16 - 18)  SpO2: 99% (15 Jul 2018 17:34) (96% - 99%)    I&O's Summary    PHYSICAL EXAM  General Appearance: cooperative, no acute distress,  HEENT: PERRL, conjunctiva clear, EOM's intact, .  Neck: Supple, , no adenopathy, thyroid: not enlarged, no carotid bruit or JVD  Back: Symmetric, no  tenderness,no soft tissue tenderness  Lungs: Clear to auscultation bilateral,no adventitious breath sounds, normal   expiratory phase  Heart: Regular rate and rhythm, S1, S2 normal, no murmur, rub or gallop  Abdomen: Soft, non-tender, bowel sounds active , no hepatosplenomegaly  Extremities: no cyanosis or edema, no joint swelling  Skin: Skin color, texture normal, no rashes   Neurologic: Alert and oriented X3 , cranial nerves intact, sensory and motor normal,        INTERPRETATION OF TELEMETRY:  Sinus bradycardia    ECG: NSR NSSST changes        LABS:            CARDIAC MARKERS ( 15 Jul 2018 01:37 )  <0.015 ng/mL / x     / x     / x     / x      CARDIAC MARKERS ( 14 Jul 2018 19:47 )  <0.015 ng/mL / x     / x     / x     / x      CARDIAC MARKERS ( 14 Jul 2018 14:22 )  <0.015 ng/mL / x     / 63 U/L / x     / x              PT/INR - ( 15 Jul 2018 05:54 )   PT: 27.1 sec;   INR: 2.46 ratio
c/c: HTN, weakness, chest pain    HPI: 73 y.o F PMH of HTN, diastolic CHF, CVA (2018) with resolved right sided weakness, DVT in LLE on Coumadin brought to the ED by family for concerns of headache, high blood pressure and generalized weakness that patient was concerned she may have another stroke. She reports similar symptoms to prior CVA.     : feeling better, c/o " heart sinking" at times  tele unremarkable      PHYSICAL EXAM:    Daily     Daily Weight in k.7 (2018 08:04)    ICU Vital Signs Last 24 Hrs  T(C): 36.8 (2018 13:26), Max: 36.8 (2018 13:26)  T(F): 98.2 (2018 13:26), Max: 98.2 (2018 13:26)  HR: 67 (2018 13:) (53 - 72)  BP: 141/61 (2018 13:26) (113/73 - 141/61)  BP(mean): 67 (:) (67 - 67)  ABP: --  ABP(mean): --  RR: 18 (2018 10:07) (17 - 18)  SpO2: 99% (2018 10:07) (95% - 99%)      Constitutional: Weak appearing  HEENT: Atraumatic,   Respiratory: Breath Sounds normal, no rhonchi/wheeze  Cardiovascular: N S1S2;   Gastrointestinal: Abdomen soft, non tender, Bowel Sounds present  Extremities: No edema, peripheral pulses present  Neurological: AAO x 3, no gross focal motor deficits  Skin: Non cellulitic, no rash, ulcers                            10.8   4.39  )-----------( 204      ( 2018 09:19 )             36.5       CBC Full  -  ( 2018 09:19 )  WBC Count : 4.39 K/uL  Hemoglobin : 10.8 g/dL  Hematocrit : 36.5 %  Platelet Count - Automated : 204 K/uL  Mean Cell Volume : 65.8 fl  Mean Cell Hemoglobin : 19.5 pg  Mean Cell Hemoglobin Concentration : 29.6 gm/dL  Auto Neutrophil # : x  Auto Lymphocyte # : x  Auto Monocyte # : x  Auto Eosinophil # : x  Auto Basophil # : x  Auto Neutrophil % : x  Auto Lymphocyte % : x  Auto Monocyte % : x  Auto Eosinophil % : x  Auto Basophil % : x          137  |  104  |  11  ----------------------------<  114<H>  4.0   |  26  |  0.68    Ca    8.9      2018 12:27    TPro  7.5  /  Alb  3.6  /  TBili  0.4  /  DBili  x   /  AST  23  /  ALT  21  /  AlkPhos  86  07-12      LIVER FUNCTIONS - ( 2018 12:27 )  Alb: 3.6 g/dL / Pro: 7.5 gm/dL / ALK PHOS: 86 U/L / ALT: 21 U/L / AST: 23 U/L / GGT: x             PT/INR - ( 2018 09:19 )   PT: 31.2 sec;   INR: 2.83 ratio         PTT - ( 2018 12:27 )  PTT:36.8 sec    CARDIAC MARKERS ( 2018 19:29 )  <0.015 ng/mL / x     / x     / x     / x      CARDIAC MARKERS ( 2018 16:49 )  <0.015 ng/mL / x     / x     / x     / x      CARDIAC MARKERS ( 2018 12:27 )  <0.015 ng/mL / x     / x     / x     / x            Urinalysis Basic - ( 2018 12:28 )    Color: Yellow / Appearance: Clear / S.010 / pH: x  Gluc: x / Ketone: Negative  / Bili: Negative / Urobili: Negative mg/dL   Blood: x / Protein: Negative mg/dL / Nitrite: Negative   Leuk Esterase: Negative / RBC: x / WBC x   Sq Epi: x / Non Sq Epi: x / Bacteria: x            MEDICATIONS  (STANDING):  amLODIPine   Tablet 5 milliGRAM(s) Oral at bedtime  aspirin enteric coated 81 milliGRAM(s) Oral daily  atorvastatin 40 milliGRAM(s) Oral at bedtime  lisinopril 2.5 milliGRAM(s) Oral daily  metoprolol tartrate 25 milliGRAM(s) Oral two times a day  sodium chloride 0.9%. 1000 milliLiter(s) (100 mL/Hr) IV Continuous <Continuous>  warfarin 2 milliGRAM(s) Oral daily
c/c: HTN, weakness, chest pain    HPI: 73 y.o F PMH of HTN, diastolic CHF, CVA (2018) with resolved right sided weakness, DVT in LLE on Coumadin brought to the ED by family for concerns of headache, high blood pressure and generalized weakness that patient was concerned she may have another stroke. She reports similar symptoms to prior CVA.     : feeling better, c/o " heart sinking" at times  tele unremarkable    : new T wave inversions in lateral leads  feels tired      PHYSICAL EXAM:    Daily     Daily Weight in k.2 (2018 09:30)    ICU Vital Signs Last 24 Hrs  T(C): 36.7 (2018 05:34), Max: 36.7 (2018 05:34)  T(F): 98 (2018 05:34), Max: 98 (2018 05:34)  HR: 61 (2018 21:55) (61 - 64)  BP: 139/59 (2018 21:55) (115/56 - 139/59)  BP(mean): --  ABP: --  ABP(mean): --  RR: 18 (2018 19:56) (18 - 18)  SpO2: 98% (2018 05:34) (97% - 100%)      Constitutional: Weak appearing  HEENT: Atraumatic,   Respiratory: Breath Sounds normal, no rhonchi/wheeze  Cardiovascular: N S1S2;   Gastrointestinal: Abdomen soft, non tender, Bowel Sounds present  Extremities: No edema, peripheral pulses present  Neurological: AAO x 3, no gross focal motor deficits  Skin: Non cellulitic, no rash, ulcers                          10.8   4.39  )-----------( 204      ( 2018 09:19 )             36.5       CBC Full  -  ( 2018 09:19 )  WBC Count : 4.39 K/uL  Hemoglobin : 10.8 g/dL  Hematocrit : 36.5 %  Platelet Count - Automated : 204 K/uL  Mean Cell Volume : 65.8 fl  Mean Cell Hemoglobin : 19.5 pg  Mean Cell Hemoglobin Concentration : 29.6 gm/dL  Auto Neutrophil # : x  Auto Lymphocyte # : x  Auto Monocyte # : x  Auto Eosinophil # : x  Auto Basophil # : x  Auto Neutrophil % : x  Auto Lymphocyte % : x  Auto Monocyte % : x  Auto Eosinophil % : x  Auto Basophil % : x      PT/INR - ( 2018 05:47 )   PT: 28.7 sec;   INR: 2.61 ratio             CARDIAC MARKERS ( 2018 14:22 )  <0.015 ng/mL / x     / 63 U/L / x     / x      CARDIAC MARKERS ( 2018 19:29 )  <0.015 ng/mL / x     / x     / x     / x      CARDIAC MARKERS ( 2018 16:49 )  <0.015 ng/mL / x     / x     / x     / x            < from: Transthoracic Echocardiogram Follow Up (18 @ 09:47) >   Impression     Summary     The left ventricle is normal in size, wall thickness, wall motion and   contractility .   Estimated left ventricular ejection fraction is 60-65 %.   Trace pericardial effusion is present. No echo evidence of tamponade   No evidence of pleural effusion.   limited study without doppler    < end of copied text >          MEDICATIONS  (STANDING):  amLODIPine   Tablet 5 milliGRAM(s) Oral at bedtime  aspirin enteric coated 81 milliGRAM(s) Oral daily  atorvastatin 40 milliGRAM(s) Oral at bedtime  lisinopril 2.5 milliGRAM(s) Oral daily  metoprolol tartrate 25 milliGRAM(s) Oral two times a day  sodium chloride 0.9%. 1000 milliLiter(s) (100 mL/Hr) IV Continuous <Continuous>
c/c: HTN, weakness, chest pain    HPI: 73 y.o F PMH of HTN, diastolic CHF, CVA (2018) with resolved right sided weakness, DVT in LLE on Coumadin brought to the ED by family for concerns of headache, high blood pressure and generalized weakness that patient was concerned she may have another stroke. She reports similar symptoms to prior CVA.     : feeling better, c/o " heart sinking" at times  tele unremarkable    : new T wave inversions in lateral leads  feels tired    7/15: INR 2.46  cath on : s/p PCI and JOSE ANTONIO to LCX  feeling better      PHYSICAL EXAM:    Daily Height in cm: 167.64 (2018 10:10)    Daily Weight in k.6 (2018 07:45)    ICU Vital Signs Last 24 Hrs  T(C): 36.7 (2018 05:05), Max: 37 (15 Jul 2018 21:16)  T(F): 98 (2018 05:05), Max: 98.6 (15 Jul 2018 21:16)  HR: 64 (2018 15:50) (55 - 67)  BP: 136/66 (2018 15:50) (103/56 - 145/68)  BP(mean): --  ABP: --  ABP(mean): --  RR: 16 (2018 15:50) (16 - 18)  SpO2: 99% (2018 15:50) (93% - 100%)    Constitutional: Weak appearing  HEENT: Atraumatic,   Respiratory: Breath Sounds normal, no rhonchi/wheeze  Cardiovascular: N S1S2;   Gastrointestinal: Abdomen soft, non tender, Bowel Sounds present  Extremities: No edema, peripheral pulses present  Neurological: AAO x 3, no gross focal motor deficits  Skin: Non cellulitic, no rash, ulcers                          10.1   4.88  )-----------( 213      ( 2018 06:50 )             33.9       CBC Full  -  ( 2018 06:50 )  WBC Count : 4.88 K/uL  Hemoglobin : 10.1 g/dL  Hematocrit : 33.9 %  Platelet Count - Automated : 213 K/uL  Mean Cell Volume : 65.1 fl  Mean Cell Hemoglobin : 19.4 pg  Mean Cell Hemoglobin Concentration : 29.8 gm/dL  Auto Neutrophil # : x  Auto Lymphocyte # : x  Auto Monocyte # : x  Auto Eosinophil # : x  Auto Basophil # : x  Auto Neutrophil % : x  Auto Lymphocyte % : x  Auto Monocyte % : x  Auto Eosinophil % : x  Auto Basophil % : x      07-16    142  |  111<H>  |  18  ----------------------------<  78  3.9   |  27  |  0.55    Ca    8.3<L>      2018 06:50            PT/INR - ( 2018 06:50 )   PT: 15.3 sec;   INR: 1.41 ratio             CARDIAC MARKERS ( 15 Jul 2018 01:37 )  <0.015 ng/mL / x     / x     / x     / x      CARDIAC MARKERS ( 2018 19:47 )  <0.015 ng/mL / x     / x     / x     / x                    MEDICATIONS  (STANDING):  amLODIPine   Tablet 5 milliGRAM(s) Oral at bedtime  aspirin enteric coated 81 milliGRAM(s) Oral daily  atorvastatin 40 milliGRAM(s) Oral at bedtime  lisinopril 2.5 milliGRAM(s) Oral daily  metoprolol succinate ER 25 milliGRAM(s) Oral daily  sodium chloride 0.9%. 1000 milliLiter(s) (75 mL/Hr) IV Continuous <Continuous>  sodium chloride 0.9%. 1000 milliLiter(s) (100 mL/Hr) IV Continuous <Continuous>  warfarin 2 milliGRAM(s) Oral daily
c/c: HTN, weakness, chest pain    HPI: 73 y.o F PMH of HTN, diastolic CHF, CVA (2018) with resolved right sided weakness, DVT in LLE on Coumadin brought to the ED by family for concerns of headache, high blood pressure and generalized weakness that patient was concerned she may have another stroke. She reports similar symptoms to prior CVA.     : feeling better, c/o " heart sinking" at times  tele unremarkable    : new T wave inversions in lateral leads  feels tired    7/15: INR 2.46  cath on Monday      PHYSICAL EXAM:    Daily     Daily Weight in k.4 (15 Jul 2018 08:14)    ICU Vital Signs Last 24 Hrs  T(C): 36.4 (15 Jul 2018 04:16), Max: 36.7 (2018 16:27)  T(F): 97.5 (15 Jul 2018 04:16), Max: 98 (2018 16:27)  HR: 50 (15 Jul 2018 10:13) (39 - 60)  BP: 112/54 (15 Jul 2018 10:13) (112/54 - 138/67)  BP(mean): --  ABP: --  ABP(mean): --  RR: 16 (15 Jul 2018 10:13) (16 - 18)  SpO2: 98% (15 Jul 2018 10:13) (96% - 99%)            Constitutional: Weak appearing  HEENT: Atraumatic,   Respiratory: Breath Sounds normal, no rhonchi/wheeze  Cardiovascular: N S1S2;   Gastrointestinal: Abdomen soft, non tender, Bowel Sounds present  Extremities: No edema, peripheral pulses present  Neurological: AAO x 3, no gross focal motor deficits  Skin: Non cellulitic, no rash, ulcers                PT/INR - ( 15 Jul 2018 05:54 )   PT: 27.1 sec;   INR: 2.46 ratio             CARDIAC MARKERS ( 15 Jul 2018 01:37 )  <0.015 ng/mL / x     / x     / x     / x      CARDIAC MARKERS ( 2018 19:47 )  <0.015 ng/mL / x     / x     / x     / x      CARDIAC MARKERS ( 2018 14:22 )  <0.015 ng/mL / x     / 63 U/L / x     / x                    MEDICATIONS  (STANDING):  amLODIPine   Tablet 5 milliGRAM(s) Oral at bedtime  aspirin enteric coated 81 milliGRAM(s) Oral daily  atorvastatin 40 milliGRAM(s) Oral at bedtime  lisinopril 2.5 milliGRAM(s) Oral daily  metoprolol tartrate 25 milliGRAM(s) Oral two times a day  phytonadione   Solution 2.5 milliGRAM(s) Oral once  sodium chloride 0.9%. 1000 milliLiter(s) (100 mL/Hr) IV Continuous <Continuous>

## 2018-07-17 NOTE — PROGRESS NOTE ADULT - ASSESSMENT
Recurrent dizziness since her CVA.   No further Chest pain   Dyspnea on exertion and palpitations the symptoms have been present for several months. her exercise tolerance is diminishing.  Essential hypertension.  Abnormal EKG. new EKG changes.  High cholesterol.   Recurrent DVT and history of CVA patient is on long-term anticoagulation.  Suggest  Observe on telemetry.  Follow-up with CPK, troponin I, EKG.    Continue with current cardiac meds.  Hold warfarin. Due to decreasing excess tolerance with chest pain, dyspnea on exertion abnormal EKG advised cardiac catheterization, risks benefits alternatives discussed with patient and family at bedside.    Discussed with the hospitalist & Dr Lira.
73 y.o F PMH of left CVA with no residual deficits, HTN presented for weakness and mild headache resolving but with complaints of chest pressure found to have new EKG findings admitted to rule out ACS:     # Abnormal EKG / Generalized Weakness: MI ruled out   - cardio consult appreciated; cardiac cath on Monday d/t new T wave inversions in lateral leads; hold coumadin for the same  - no infection on CXR/ stable labs.   - no hx of hypothyroidism, TSH in April normal, no reason to suspect this to be abnormal now thus hold on further testing.   - orthostatics vitals checked; Negative  - cont daily Asp 81mg.   - add statin in the setting of prior CVA.   - echo : EF 60-65 %    # Hx of CVA - no gross deficits, starting Asp/ Statin.   - fall precautions.   - PT consult for ambulation.   - checked carotid doppler; normal    # Uncontrolled HTN - better Bp now, suspect patient with component of anxiety contributing to elevated BP.   - may need to switch Norvasc from nighttime dose to AM dose, will trend for now.   - cont Enalapril 10mg daily,  Norvasc 5mg QHS.     # Hx of Diastolic CHF - appears compensated.     # Hx of Leg DVT - vit k 2.5 mg today for preparation for cath on 7/16    poc discussed with pt, her daughter,  Dr. Lynne    dispo: tele, cath on Monday, vit K 2.5 mg today
73 y.o F PMH of left CVA with no residual deficits, HTN presented for weakness and mild headache resolving but with complaints of chest pressure found to have new EKG findings admitted to rule out ACS:     # Abnormal EKG / Generalized Weakness: MI ruled out   - cardio consult appreciated; cardiac cath on Monday d/t new T wave inversions in lateral leads; hold coumadin for the same  - no infection on CXR/ stable labs.   - no hx of hypothyroidism, TSH in April normal, no reason to suspect this to be abnormal now thus hold on further testing.   - orthostatics vitals checked; Negative  - start daily Asp 81mg.   - add statin in the setting of prior CVA.   - echo : EF 60-65 %    # Hx of CVA - no gross deficits, starting Asp/ Statin.   - fall precautions.   - PT consult for ambulation.   - checked carotid doppler; normal    # Uncontrolled HTN - better Bp now, suspect patient with component of anxiety contributing to elevated BP.   - may need to switch Norvasc from nighttime dose to AM dose, will trend for now.   - cont Enalapril 10mg daily,  Norvasc 5mg QHS.     # Hx of Diastolic CHF - appears compensated.     # Hx of Leg DVT - therapeutic on Coumadin 2mg QHS.   - daily INR.     Dispo: tele monitoring, echo, carotid doppler    poc discussed with pt, her daughter, son in law, Dr. Asher,  at , team    dispo: cath on Monday
73 y.o F PMH of left CVA with no residual deficits, HTN presented for weakness and mild headache resolving but with complaints of chest pressure found to have new EKG findings admitted to rule out ACS:     # Abnormal EKG / Generalized Weakness: MI ruled out   - cardio consult appreciated; cardiac cath on Monday d/t new T wave inversions in lateral leads; s/p PCI and JOSE ANTONIO to LCX  - no hx of hypothyroidism, TSH in April normal, no reason to suspect this to be abnormal now thus hold on further testing.   - orthostatics vitals checked; Negative  - cont daily Asp 81mg, plavix 75 mg  - add statin in the setting of prior CVA.   - echo : EF 60-65 %    # Hx of CVA - no gross deficits, starting Asp/ Statin.   - fall precautions.   - PT consult for ambulation.   - checked carotid doppler; normal    # Uncontrolled HTN - better Bp now, suspect patient with component of anxiety contributing to elevated BP.   - may need to switch Norvasc from nighttime dose to AM dose, will trend for now.   - cont Enalapril 10mg daily,  Norvasc 5mg QHS.     # Hx of Diastolic CHF - appears compensated.     # Hx of Leg DVT - restart coumadin    poc discussed with pt, her daughter,  Dr. Lynne
73 y.o F PMH of left CVA with no residual deficits, HTN presented for weakness and mild headache resolving but with complaints of chest pressure found to have new EKG findings admitted to rule out ACS:     # Abnormal EKG / Generalized Weakness: MI ruled out   - cardio consult appreciated; nuclear stress test advised  - no infection on CXR/ stable labs.   - no hx of hypothyroidism, TSH in April normal, no reason to suspect this to be abnormal now thus hold on further testing.   - orthostatics vitals checked; Negative  - start daily Asp 81mg.   - add statin in the setting of prior CVA.   echo awaited    # Hx of CVA - no gross deficits, starting Asp/ Statin.   - fall precautions.   - PT consult for ambulation.   - check carotid doppler    # Uncontrolled HTN - better Bp now, suspect patient with component of anxiety contributing to elevated BP.   - may need to switch Norvasc from nighttime dose to AM dose, will trend for now.   - cont Enalpril 10mg QAM, Norvasc 5mg QHS.     # Hx of Diastolic CHF - appears compensated.     # Hx of Leg DVT - therapeutic on Coumadin 2mg QHS.   - daily INR.     Dispo: tele monitoring, echo, carotid doppler    poc discussed with pt, her son and family at bedside, team
Recurrent dizziness since her CVA.   No further Chest pain   Dyspnea on exertion and palpitations the symptoms have been present for several months. her exercise tolerance is diminishing.  Essential hypertension.  Abnormal EKG. new EKG changes.  High cholesterol.   Recurrent DVT and history of CVA patient is on long-term anticoagulation.  Suggest  Observe on telemetry.  Follow-up with CPK, troponin I, EKG.    Continue with current cardiac meds.  Hold warfarin. Due to decreasing excess tolerance with chest pain, dyspnea on exertion abnormal EKG advised cardiac catheterization, risks benefits alternatives discussed with patient and family at bedside.    Discussed general condition with patient's daughter at bedside.  Discussed with the hospitalist Dr Dolan
Recurrent dizziness since her CVA.   No further Chest pain   Dyspnea on exertion and palpitations the symptoms have been present for several months. her exercise tolerance is diminishing.  Essential hypertension.  Abnormal EKG. new EKG changes.  High cholesterol.   Recurrent DVT and history of CVA patient is on long-term anticoagulation.  Suggest  Observe on telemetry.  Follow-up with CPK, troponin I, EKG.    Continue with current cardiac meds.  Hold warfarin. Due to decreasing excess tolerance with chest pain, dyspnea on exertion abnormal EKG advised cardiac catheterization, risks benefits alternatives discussed with patient and family at bedside.    Discussed with the hospitalist Dr Dolan
Recurrent dizziness since her CVA.   No further Chest pain  she feels better after stent to proximal left circumflex.  Essential hypertension.  High cholesterol.   Recurrent DVT and history of CVA patient is on long-term anticoagulation.  Suggest  Continue with current cardiac medications.  Due to recurrent DVT and PE start Eliquis 5 mg by mouth twice a day. Stop aspirin and continue with Plavix.  Due to GERD and Protonix 40 mg by mouth once daily.  Advised patient to lose weight, follow low sodium low cholesterol ADA diet.  Patient and her daughter were educated on medications and diet and activity.    Discussed with the hospitalist & Dr Lira.
This is a 73 y.o F PMH of left CVA with no residual deficits, HTN presented for weakness and mild headache resolving but with complaints of chest pressure found to have new EKG findings admitted to rule out ACS:     Patient now s/p angiogram  with PCI and JOSE ANTONIO to the LCX      - AM labs and EKG reviewed  - procedure, outcome and f/u care reviewed with patient/MD  - continue ASA till INR > 2  - continue Plavix/ warfarin   - continue statin/ B' blocker / ACE  - continue hospitalist service  - possible DC home today  - f/u with MD in 4-7 days

## 2018-07-17 NOTE — PROGRESS NOTE ADULT - PROVIDER SPECIALTY LIST ADULT
Cardiology
Hospitalist
Cardiology

## 2018-07-17 NOTE — DISCHARGE NOTE ADULT - HOSPITAL COURSE
73 y.o F PMH of HTN, diastolic CHF, CVA (2/2018) with resolved right sided weakness, DVT in LLE on Coumadin presented for weakness and mild headache resolving but with complaints of chest pressure found to have new EKG findings of T wave inversion in lateral leads,  admitted to rule out ACS:     # Abnormal EKG / Generalized Weakness: MI ruled out   - cardio consult appreciated; cardiac cath on Monday d/t new T wave inversions in lateral leads; s/p PCI and JOSE ANTONIO to LCX  - orthostatics vitals checked; Negative  - cont plavix and eliquis  - add statin in the setting of prior CVA.   - echo : EF 60-65 %; normal LV function    # Hx of CVA - no gross deficits, starting plavix/ Statin.   - fall precautions.   - checked carotid doppler; normal    # Uncontrolled HTN - better Bp now, suspect patient with component of anxiety contributing to elevated BP.   - cont lisinopril 2.5mg daily,  Norvasc 5mg QHS.     # Hx of Diastolic CHF - appears compensated.     # Hx of Leg DVT - started on eliquis    poc discussed with pt, her daughter,  Dr. Lynne    d/c home today    total time spent 40 min

## 2018-07-17 NOTE — DISCHARGE NOTE ADULT - MEDICATION SUMMARY - MEDICATIONS TO TAKE
I will START or STAY ON the medications listed below when I get home from the hospital:    Tylenol 500 mg oral tablet  -- 1 tab(s) by mouth , As Needed  -- Indication: For .    lisinopril 2.5 mg oral tablet  -- 1 tab(s) by mouth once a day  -- Indication: For .    apixaban 5 mg oral tablet  -- 1 tab(s) by mouth every 12 hours  -- Indication: For .    atorvastatin 40 mg oral tablet  -- 1 tab(s) by mouth once a day (at bedtime)  -- Indication: For .    clopidogrel 75 mg oral tablet  -- 1 tab(s) by mouth once a day  -- Indication: For stent cad    Xanax 0.25 mg oral tablet  -- 1 tab(s) by mouth 2 times a day MDD:2 tabs per day max  -- Avoid grapefruit and grapefruit juice while taking this medication.  Caution federal law prohibits the transfer of this drug to any person other  than the person for whom it was prescribed.  Do not take this drug if you are pregnant.  May cause drowsiness.  Alcohol may intensify this effect.  Use care when operating dangerous machinery.    -- Indication: For .    metoprolol succinate 25 mg oral tablet, extended release  -- 1 tab(s) by mouth once a day  -- Indication: For .    amLODIPine 5 mg oral tablet  -- 1 tab(s) by mouth once a day  -- Indication: For .    polyethylene glycol 3350 oral powder for reconstitution  -- 17 gram(s) by mouth once a day  -- Indication: For .

## 2018-07-17 NOTE — DISCHARGE NOTE ADULT - CARE PLAN
Principal Discharge DX:	Chest heaviness  Goal:	s/p PCI  Assessment and plan of treatment:	s/p PCI and JOSE ANTONIO to LCX  low salt, DASH  diet  loose weight  coumadin switched to Eliquis for DVT as per Dr. Lynne  f/u with Dr. Lynne

## 2018-07-17 NOTE — DISCHARGE NOTE ADULT - CARE PROVIDER_API CALL
Christophe Lynne (MD), Cardiovascular Disease; Internal Medicine  [address] [place] [address]  Phone: [phone]  Fax: [phone]    Wander Davidson), Internal Medicine  [address] [place] [address]  Phone: [phone]  Fax: [phone]

## 2018-07-20 DIAGNOSIS — Z86.73 PERSONAL HISTORY OF TRANSIENT ISCHEMIC ATTACK (TIA), AND CEREBRAL INFARCTION WITHOUT RESIDUAL DEFICITS: ICD-10-CM

## 2018-07-20 DIAGNOSIS — I11.0 HYPERTENSIVE HEART DISEASE WITH HEART FAILURE: ICD-10-CM

## 2018-07-20 DIAGNOSIS — K21.9 GASTRO-ESOPHAGEAL REFLUX DISEASE WITHOUT ESOPHAGITIS: ICD-10-CM

## 2018-07-20 DIAGNOSIS — Z86.718 PERSONAL HISTORY OF OTHER VENOUS THROMBOSIS AND EMBOLISM: ICD-10-CM

## 2018-07-20 DIAGNOSIS — I50.32 CHRONIC DIASTOLIC (CONGESTIVE) HEART FAILURE: ICD-10-CM

## 2018-07-20 DIAGNOSIS — Z88.0 ALLERGY STATUS TO PENICILLIN: ICD-10-CM

## 2019-01-07 NOTE — DISCHARGE NOTE ADULT - DOCTOR BEFORE STARTING, STOPPING, OR CHANGING THE DOSE OF ANY PRESCRIPTION OR OVER-THE-COUNTER MEDICATIONS. ANY PRODUCT CONTAINING ASPIRIN LESSENS THE BLOOD’S ABILITY TO FORM CLOTS AND ADDS TO THE
Name: Dariel Harris: DATY  
: 1956 Admit Date: 2019 Phone: [phone]  Room: 16[facility] PCP: Nory Khoury DO  MRN: [id number] Date: 2019  Code: DNR   
   
HPI: 
 
 
Chart and notes reviewed. Data reviewed. I review the patient's current medications in the medical record at each encounter.  I have evaluated and examined the patient. 1:32 PM    
 
History was obtained from patient. I was asked by Derek Evans MD to see Dennis Wilburn in consultation for a chief complaint of COPD exacerbation. History of Present Illness:   is very pleasant, 65year old lady that presented to [facility] yesterday with worsening shortness of breath, cough, and generalized malaise. She has a history of severe COPD with an FEV1 of 35% predicted in . She continues to smoke 1/2-1ppd since she was [de-identified] years old. She reports that over the last week, she has had to use her rescue inhaler twice per day. She is unable to sleep while laying flat or walk from room to room without getting short of breath. Has a cough that is productive of thick, grey phlegm. Denies fever or chills. Denies CP, LE pain/swelling, abdominal pain, N/V. Denies sick contacts. Her other PMH includes CAD, atrial fibrillation, history of AV and mV replacement, diastolic heart failure, and history of GI bleed. Her COPD regimen includes Trelegy, albuterol, Daliresp, and prednisone 10mg daily. Her COPD is managed by her PCP. Uses 2L of oxygen continuously. Images:   Personally reviewed. Mild pulmonary vascular congestion WBC 32.9 Hgb 7.9 (6.7 yesterday) Creat 1.12 Trop .06 Pro- RVP negative Influenza negative ABG 7.31/48/78/24/94% Past Medical History:  
Diagnosis Date  A-fib ([facility]  Anticoagulant long-term use  CAD (coronary artery disease), native coronary artery   
 mild to moderate by cath  CHF (congestive heart failure) ([facility]  Chronic obstructive pulmonary disease ([facility]  Dyslipidemia  History of vascular access device 10/26/2018 [facility] VAT - 4 FR Midline, 9 cm, R basilic, for DIVA  Ill-defined condition   
 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  
Procedure Laterality Date  COLONOSCOPY N/A 11/28/2016 COLONOSCOPY performed by Ashwini Schwartz MD at [address]  HX HEART CATHETERIZATION    
 cabg  HX HYSTERECTOMY    
 BSO  
 HX MITRAL VALVE REPLACEMENT    
 and redo MVR and AVR Family History Problem Relation Age of Onset  Cancer Brother Social History Tobacco Use  Smoking status: Current Every Day Smoker Packs/day: 0.50 Types: Cigarettes Last attempt to quit: 11/1/2015 Years since quitting: 3.1  Smokeless tobacco: Never Used  Tobacco comment: 11/1/2014 Substance Use Topics  Alcohol use: No  
  Alcohol/week: 0.0 oz Allergies Allergen Reactions  Spiriva Respimat [Tiotropium Bromide] Anaphylaxis and Other (comments) Adverse effects; Per RN - pt states that Spiriva caused throat swelling and could not breathe well.  Spiriva Respimat [Tiotropium Bromide] Shortness of Breath  Tomato Shortness of Breath Only occurs with raw tomatoes, tolerates ketchup without issue  Celebrex [Celecoxib] Rash  Celebrex [Celecoxib] Rash  Rocephin [Ceftriaxone] Shortness of Breath  Tomato Rash Current Facility-Administered Medications Medication Dose Route Frequency  oxyCODONE-acetaminophen (PERCOCET) 5-325 mg per tablet 1 Tab  1 Tab Oral Q8H PRN  
 warfarin (COUMADIN) tablet 5 mg  5 mg Oral ONCE  
  hydrOXYzine HCl (ATARAX) tablet 25 mg  25 mg Oral TID PRN  
 sodium chloride (NS) flush 5-10 mL  5-10 mL IntraVENous Q8H  
 sodium chloride (NS) flush 5-10 mL  5-10 mL IntraVENous PRN  
 docusate sodium (COLACE) capsule 100 mg  100 mg Oral BID  nicotine (NICODERM CQ) 21 mg/24 hr patch 1 Patch  1 Patch TransDERmal Q24H  
 fluticasone-umeclidin-vilanter 100-62.5-25 mcg dsdv 1 Puff (Patient Supplied)  1 Puff Inhalation DAILY  lovastatin (MEVACOR) tablet 10 mg  10 mg Oral QHS  roflumilast (DALIRESP) tablet 500 mcg  500 mcg Oral DAILY  piperacillin-tazobactam (ZOSYN) 3.375 g in 0.9% sodium chloride (MBP/ADV) 100 mL  3.375 g IntraVENous Q8H  
 0.9% sodium chloride infusion  50 mL/hr IntraVENous PRN  
 Warfarin - Pharmacist dosing   Other PRN  
 methylPREDNISolone (PF) (SOLU-MEDROL) injection 80 mg  80 mg IntraVENous Q8H  
 albuterol (ACCUNEB) nebulizer solution 1.25 mg  1.25 mg Nebulization Q4H RT  
 pantoprazole (PROTONIX) 40 mg in sodium chloride 0.9% 10 mL injection  40 mg IntraVENous Q12H  
 insulin glargine (LANTUS) injection 5 Units  5 Units SubCUTAneous QHS  insulin lispro (HUMALOG) injection   SubCUTAneous QID WITH MEALS  glucose chewable tablet 16 g  4 Tab Oral PRN  
 dextrose (D50W) injection syrg 12.5-25 g  12.5-25 g IntraVENous PRN  
 glucagon (GLUCAGEN) injection 1 mg  1 mg IntraMUSCular PRN  
 dilTIAZem (CARDIZEM) IR tablet 60 mg  60 mg Oral AC&HS Current Outpatient Medications Medication Sig  
 roflumilast (DALIRESP) tab tablet Take 500 mcg by mouth daily.  predniSONE (STERAPRED DS) 10 mg dose pack Take  by mouth See Admin Instructions. See administration instruction per 10mg dose pack  warfarin (COUMADIN) 1 mg tablet Take  by mouth six (6) days a week. Warfarin 3 mg on Sa/Alfonso/Mo/Tu/We/Th and Warfarin 2 mg on Fr  
 albuterol (VENTOLIN HFA) 90 mcg/actuation inhaler Take 2 Puffs by inhalation every four (4) hours as needed for Wheezing or Shortness of Breath.  oxyCODONE-acetaminophen (PERCOCET) 5-325 mg per tablet Take 1 Tab by mouth every eight (8) hours as needed for Pain. Max Daily Amount: 3 Tabs. Must last 30 days fill 12/27/18  lisinopril (PRINIVIL, ZESTRIL) 5 mg tablet Take 1 Tab by mouth daily.  bumetanide (BUMEX) 1 mg tablet Take 1 Tab by mouth every evening.  lovastatin (MEVACOR) 10 mg tablet Take 10 mg by mouth nightly.  fluticasone-umeclidin-vilanter (TRELEGY ELLIPTA) 100-62.5-25 mcg dsdv Take 1 Puff by inhalation daily.  docusate sodium (COLACE) 100 mg capsule Take 100 mg by mouth every evening.  potassium chloride SR (KLOR-CON 10) 10 mEq tablet Take 10 mEq by mouth daily.  dilTIAZem (CARDIZEM) 60 mg tablet TAKE 1 TABLET BEFORE BREAKFAST, LUNCH, DINNER AND AT BEDTIME REVIEW OF SYSTEMS Negative except as stated in the HPI. Physical Exam:  
Visit Vitals /56 Pulse 84 Temp 97.8 °F (36.6 °C) Resp 27 SpO2 100% General:  Alert, cooperative, no distress, appears stated age. Head:  Normocephalic, without obvious abnormality, atraumatic. Eyes:  Conjunctivae/corneas clear. Nose: Nares normal. Septum midline. Mucosa normal.   
Throat: Lips, mucosa, and tongue normal. Teeth and gums normal.  
Neck: Supple, symmetrical, trachea midline, no adenopathy Lungs:   Wheezing bilaterally with scattered rhonchi Chest wall:  No tenderness or deformity. Heart:  Regular rate and rhythm, S1, S2 normal, no murmur, click, rub or gallop. Abdomen:   Soft, non-tender. Bowel sounds normal.  
Extremities: Extremities normal, atraumatic, no cyanosis or edema. Pulses: 2+ and symmetric all extremities. Skin: Skin color, texture, turgor normal.  
Lymph nodes: Cervical nodes normal.  
Neurologic: Grossly nonfocal  
 
 
Lab Results Component Value Date/Time  Sodium 134 (L) 01/07/2019 01:25 AM  
 Potassium 4.9 01/07/2019 01:25 AM  
 Chloride 99 01/07/2019 01:25 AM  
 CO2 22 01/07/2019 01:25 AM  
 BUN 29 (H) 01/07/2019 01:25 AM  
 Creatinine 1.12 (H) 01/07/2019 01:25 AM  
 Glucose 183 (H) 01/07/2019 01:25 AM  
 Calcium 8.0 (L) 01/07/2019 01:25 AM  
 Magnesium 1.8 01/07/2019 01:25 AM  
 Phosphorus 4.9 (H) 01/07/2019 01:25 AM  
 Lactic acid 1.8 11/01/2018 03:47 AM  
 
 
Lab Results Component Value Date/Time WBC 32.5 (H) 01/07/2019 01:25 AM  
 HGB 7.9 (L) 01/07/2019 01:25 AM  
 PLATELET 642 30/33/9087 01:25 AM  
 MCV 92.4 01/07/2019 01:25 AM  
 
 
Lab Results Component Value Date/Time INR 1.3 (H) 01/07/2019 01:25 AM  
 aPTT <20.0 (L) 01/06/2019 02:39 PM  
 AST (SGOT) 75 (H) 01/07/2019 01:25 AM  
 Alk. phosphatase 72 01/07/2019 01:25 AM  
 Protein, total 6.1 (L) 01/07/2019 01:25 AM  
 Albumin 3.2 (L) 01/07/2019 01:25 AM  
 Globulin 2.9 01/07/2019 01:25 AM  
 
 
Lab Results Component Value Date/Time Iron 126 11/29/2018 08:18 AM  
 TIBC 352 11/29/2018 08:18 AM  
 Iron % saturation 36 11/29/2018 08:18 AM  
 Ferritin 89 11/29/2018 08:18 AM  
 
 
Lab Results Component Value Date/Time TSH 1.70 10/26/2018 09:50 AM  
  
 
Lab Results Component Value Date/Time PH 7.31 (L) 01/06/2019 02:05 PM  
 PCO2 48 (H) 01/06/2019 02:05 PM  
 PO2 78 (L) 01/06/2019 02:05 PM  
 HCO3 24 01/06/2019 02:05 PM  
 
 
Lab Results Component Value Date/Time CK 47 06/07/2018 12:18 PM  
 CK-MB Index 4.0 (H) 06/07/2018 12:18 PM  
 Troponin-I, Qt. 0.05 (H) 01/07/2019 09:36 AM  
  
 
Lab Results Component Value Date/Time Culture result: NO GROWTH AFTER 14 HOURS 01/06/2019 04:14 PM  
 Culture result: NO GROWTH 5 DAYS 10/31/2018 09:39 AM  
 Culture result: LIGHT YEAST, (APPARENT CANDIDA ALBICANS) (A) 10/27/2018 05:25 PM  
 Culture result: LIGHT 
APPARENT 
2ND COLONY TYPE OF YEAST 
 10/27/2018 05:25 PM  
 Culture result: NO NORMAL RESPIRATORY GIOVANNI ISOLATED 10/27/2018 05:25 PM  
 
 
No results found for: TOXA1, RPR, HBCM, HBSAG, HAAB, HCAB1, HAAT, G6PD, CRYAC, HIVGT, HIVR, HIV1, HIV12, HIVPC, HIVRPI Lab Results Component Value Date/Time Vancomycin,trough 33.8 (HH) 12/08/2015 12:10 PM  
 CK 47 06/07/2018 12:18 PM  
 CK 29 11/25/2015 03:15 PM  
 
 
Lab Results Component Value Date/Time Color YELLOW/STRAW 01/07/2019 04:02 AM  
 Appearance CLOUDY (A) 01/07/2019 04:02 AM  
 pH (UA) 5.0 01/07/2019 04:02 AM  
 Protein TRACE (A) 01/07/2019 04:02 AM  
 Glucose 250 (A) 01/07/2019 04:02 AM  
 Ketone NEGATIVE  01/07/2019 04:02 AM  
 Bilirubin NEGATIVE  01/07/2019 04:02 AM  
 Blood SMALL (A) 01/07/2019 04:02 AM  
 Urobilinogen 1.0 01/07/2019 04:02 AM  
 Nitrites NEGATIVE  01/07/2019 04:02 AM  
 Leukocyte Esterase NEGATIVE  01/07/2019 04:02 AM  
 WBC 0-4 01/07/2019 04:02 AM  
 RBC 5-10 01/07/2019 04:02 AM  
 Epithelial cells 0-10 02/18/2016 08:26 AM  
 Bacteria NEGATIVE  01/07/2019 04:02 AM  
 
 
IMPRESSION 
· Acute on Chronic Hypoxic Respiratory Failure · Acute COPD Exacerbation · Anemia · History of Diastolic Heart Failure · Current Smoker · Hx of Atrial Fibrillation; on AC 
 
PLAN 
· Supplemental O2 to keep sats >90% · Continue scheduled nebs · IVCS 
· F/U sputum culture · Home Trelegy · Switch Zosyn to Doxycycline · Encouraged smoking cessation, Nicotine patch in place · GI consulted · Start home Bumex tomorrow per Cardiology · Pharmacy to dose Coumadin · Protonix Thank you very much for the consult.  
 
 
Julianne Jimenez NP 
 Statement Selected

## 2019-04-18 NOTE — ED ADULT NURSE NOTE - FALLEN IN THE PAST
4/18/2019      Dear Natalie Andrade  [address] [place] [address]      Our records indicate that you are due for Diabetes care. The interventions due at this time are:    _____Diabetes-focused visit (due every 6 months)    ___X__A1c (checks your average blood sugar over the past 3 months)    _____Fasting Lipid Panel (cholesterol)=nothing to eat or drink except water for 12 hours before the blood draw.    _____Urine Microalbumin (checks your kidney function)    Please contact our clinic if you have had these tests done elsewhere, so that we can update your medical record.    Please call our office at Dept: [phone] as soon as possible so we can place the orders for your lab work. If an appointment is due in addition to the  lab work that is due, it is extremely beneficial to arrange for the lab work to be completed one week before your appointment date.       Sincerely,      Brice Miller MD    [address] [place] [address] [phone]   no

## 2019-10-17 NOTE — ED PROVIDER NOTE - SEVERITY
October 17, 2019      [facility] [place] [address]  Phone: [phone]  Fax: [phone]       Patient: April April Schamberger   YOB: 1982  Date of Visit: 10/17/2019    To Whom It May Concern:    April Schamberger  was at [facility] on 10/17/2019. She may return to work on 10/21/19 with no restrictions. If you have any questions or concerns, or if I can be of further assistance, please do not hesitate to contact me.    Sincerely,      Francheska Jones PA-C      [facility]

## 2019-10-26 ENCOUNTER — EMERGENCY (EMERGENCY)
Facility: HOSPITAL | Age: 74
LOS: 0 days | Discharge: HOME | End: 2019-10-26
Attending: EMERGENCY MEDICINE
Payer: MEDICARE

## 2019-10-26 VITALS
DIASTOLIC BLOOD PRESSURE: 93 MMHG | HEART RATE: 64 BPM | SYSTOLIC BLOOD PRESSURE: 169 MMHG | RESPIRATION RATE: 18 BRPM | TEMPERATURE: 99 F | OXYGEN SATURATION: 97 %

## 2019-10-26 VITALS — HEIGHT: 67 IN | WEIGHT: 199.96 LBS

## 2019-10-26 DIAGNOSIS — Z98.890 OTHER SPECIFIED POSTPROCEDURAL STATES: ICD-10-CM

## 2019-10-26 DIAGNOSIS — R42 DIZZINESS AND GIDDINESS: ICD-10-CM

## 2019-10-26 DIAGNOSIS — E78.5 HYPERLIPIDEMIA, UNSPECIFIED: ICD-10-CM

## 2019-10-26 DIAGNOSIS — G40.909 EPILEPSY, UNSPECIFIED, NOT INTRACTABLE, WITHOUT STATUS EPILEPTICUS: ICD-10-CM

## 2019-10-26 DIAGNOSIS — I50.9 HEART FAILURE, UNSPECIFIED: ICD-10-CM

## 2019-10-26 DIAGNOSIS — M45.9 ANKYLOSING SPONDYLITIS OF UNSPECIFIED SITES IN SPINE: ICD-10-CM

## 2019-10-26 DIAGNOSIS — Z90.710 ACQUIRED ABSENCE OF BOTH CERVIX AND UTERUS: ICD-10-CM

## 2019-10-26 DIAGNOSIS — Z79.899 OTHER LONG TERM (CURRENT) DRUG THERAPY: ICD-10-CM

## 2019-10-26 DIAGNOSIS — Z90.710 ACQUIRED ABSENCE OF BOTH CERVIX AND UTERUS: Chronic | ICD-10-CM

## 2019-10-26 DIAGNOSIS — I25.10 ATHEROSCLEROTIC HEART DISEASE OF NATIVE CORONARY ARTERY WITHOUT ANGINA PECTORIS: ICD-10-CM

## 2019-10-26 DIAGNOSIS — I10 ESSENTIAL (PRIMARY) HYPERTENSION: ICD-10-CM

## 2019-10-26 DIAGNOSIS — R53.1 WEAKNESS: ICD-10-CM

## 2019-10-26 DIAGNOSIS — Z79.01 LONG TERM (CURRENT) USE OF ANTICOAGULANTS: ICD-10-CM

## 2019-10-26 DIAGNOSIS — R60.0 LOCALIZED EDEMA: ICD-10-CM

## 2019-10-26 DIAGNOSIS — Z86.718 PERSONAL HISTORY OF OTHER VENOUS THROMBOSIS AND EMBOLISM: ICD-10-CM

## 2019-10-26 LAB
ADD ON TEST-SPECIMEN IN LAB: SIGNIFICANT CHANGE UP
ALBUMIN SERPL ELPH-MCNC: 3.4 G/DL — SIGNIFICANT CHANGE UP (ref 3.3–5)
ALP SERPL-CCNC: 100 U/L — SIGNIFICANT CHANGE UP (ref 40–120)
ALT FLD-CCNC: 30 U/L — SIGNIFICANT CHANGE UP (ref 12–78)
ANION GAP SERPL CALC-SCNC: 3 MMOL/L — LOW (ref 5–17)
APTT BLD: 29.9 SEC — SIGNIFICANT CHANGE UP (ref 27.5–36.3)
AST SERPL-CCNC: 18 U/L — SIGNIFICANT CHANGE UP (ref 15–37)
BASOPHILS # BLD AUTO: 0.01 K/UL — SIGNIFICANT CHANGE UP (ref 0–0.2)
BASOPHILS NFR BLD AUTO: 0.1 % — SIGNIFICANT CHANGE UP (ref 0–2)
BILIRUB SERPL-MCNC: 0.5 MG/DL — SIGNIFICANT CHANGE UP (ref 0.2–1.2)
BUN SERPL-MCNC: 28 MG/DL — HIGH (ref 7–23)
CALCIUM SERPL-MCNC: 9.1 MG/DL — SIGNIFICANT CHANGE UP (ref 8.5–10.1)
CHLORIDE SERPL-SCNC: 107 MMOL/L — SIGNIFICANT CHANGE UP (ref 96–108)
CK SERPL-CCNC: 39 U/L — SIGNIFICANT CHANGE UP (ref 26–192)
CO2 SERPL-SCNC: 31 MMOL/L — SIGNIFICANT CHANGE UP (ref 22–31)
CREAT SERPL-MCNC: 0.89 MG/DL — SIGNIFICANT CHANGE UP (ref 0.5–1.3)
EOSINOPHIL # BLD AUTO: 0 K/UL — SIGNIFICANT CHANGE UP (ref 0–0.5)
EOSINOPHIL NFR BLD AUTO: 0 % — SIGNIFICANT CHANGE UP (ref 0–6)
ERYTHROCYTE [SEDIMENTATION RATE] IN BLOOD: 5 MM/HR — SIGNIFICANT CHANGE UP (ref 0–20)
GLUCOSE SERPL-MCNC: 100 MG/DL — HIGH (ref 70–99)
HCT VFR BLD CALC: 42 % — SIGNIFICANT CHANGE UP (ref 34.5–45)
HGB BLD-MCNC: 12.3 G/DL — SIGNIFICANT CHANGE UP (ref 11.5–15.5)
IMM GRANULOCYTES NFR BLD AUTO: 0.9 % — SIGNIFICANT CHANGE UP (ref 0–1.5)
INR BLD: 1.03 RATIO — SIGNIFICANT CHANGE UP (ref 0.88–1.16)
LYMPHOCYTES # BLD AUTO: 2.04 K/UL — SIGNIFICANT CHANGE UP (ref 1–3.3)
LYMPHOCYTES # BLD AUTO: 23.4 % — SIGNIFICANT CHANGE UP (ref 13–44)
MCHC RBC-ENTMCNC: 19.6 PG — LOW (ref 27–34)
MCHC RBC-ENTMCNC: 29.3 GM/DL — LOW (ref 32–36)
MCV RBC AUTO: 66.8 FL — LOW (ref 80–100)
MONOCYTES # BLD AUTO: 0.38 K/UL — SIGNIFICANT CHANGE UP (ref 0–0.9)
MONOCYTES NFR BLD AUTO: 4.4 % — SIGNIFICANT CHANGE UP (ref 2–14)
NEUTROPHILS # BLD AUTO: 6.19 K/UL — SIGNIFICANT CHANGE UP (ref 1.8–7.4)
NEUTROPHILS NFR BLD AUTO: 71.2 % — SIGNIFICANT CHANGE UP (ref 43–77)
PLATELET # BLD AUTO: 244 K/UL — SIGNIFICANT CHANGE UP (ref 150–400)
POTASSIUM SERPL-MCNC: 3.9 MMOL/L — SIGNIFICANT CHANGE UP (ref 3.5–5.3)
POTASSIUM SERPL-SCNC: 3.9 MMOL/L — SIGNIFICANT CHANGE UP (ref 3.5–5.3)
PROT SERPL-MCNC: 7 GM/DL — SIGNIFICANT CHANGE UP (ref 6–8.3)
PROTHROM AB SERPL-ACNC: 11.5 SEC — SIGNIFICANT CHANGE UP (ref 10–12.9)
RBC # BLD: 6.29 M/UL — HIGH (ref 3.8–5.2)
RBC # FLD: 15.7 % — HIGH (ref 10.3–14.5)
SODIUM SERPL-SCNC: 141 MMOL/L — SIGNIFICANT CHANGE UP (ref 135–145)
TROPONIN I SERPL-MCNC: <0.015 NG/ML — SIGNIFICANT CHANGE UP (ref 0.01–0.04)
WBC # BLD: 8.7 K/UL — SIGNIFICANT CHANGE UP (ref 3.8–10.5)
WBC # FLD AUTO: 8.7 K/UL — SIGNIFICANT CHANGE UP (ref 3.8–10.5)

## 2019-10-26 PROCEDURE — 36415 COLL VENOUS BLD VENIPUNCTURE: CPT

## 2019-10-26 PROCEDURE — 70498 CT ANGIOGRAPHY NECK: CPT | Mod: 26

## 2019-10-26 PROCEDURE — 93005 ELECTROCARDIOGRAM TRACING: CPT

## 2019-10-26 PROCEDURE — 80053 COMPREHEN METABOLIC PANEL: CPT

## 2019-10-26 PROCEDURE — 82962 GLUCOSE BLOOD TEST: CPT

## 2019-10-26 PROCEDURE — 71045 X-RAY EXAM CHEST 1 VIEW: CPT

## 2019-10-26 PROCEDURE — 99284 EMERGENCY DEPT VISIT MOD MDM: CPT | Mod: 25

## 2019-10-26 PROCEDURE — 96374 THER/PROPH/DIAG INJ IV PUSH: CPT | Mod: 59

## 2019-10-26 PROCEDURE — 93010 ELECTROCARDIOGRAM REPORT: CPT

## 2019-10-26 PROCEDURE — 85652 RBC SED RATE AUTOMATED: CPT

## 2019-10-26 PROCEDURE — 82550 ASSAY OF CK (CPK): CPT

## 2019-10-26 PROCEDURE — 99284 EMERGENCY DEPT VISIT MOD MDM: CPT

## 2019-10-26 PROCEDURE — 70496 CT ANGIOGRAPHY HEAD: CPT

## 2019-10-26 PROCEDURE — 83880 ASSAY OF NATRIURETIC PEPTIDE: CPT

## 2019-10-26 PROCEDURE — 85025 COMPLETE CBC W/AUTO DIFF WBC: CPT

## 2019-10-26 PROCEDURE — 70496 CT ANGIOGRAPHY HEAD: CPT | Mod: 26

## 2019-10-26 PROCEDURE — 85730 THROMBOPLASTIN TIME PARTIAL: CPT

## 2019-10-26 PROCEDURE — 85610 PROTHROMBIN TIME: CPT

## 2019-10-26 PROCEDURE — 70498 CT ANGIOGRAPHY NECK: CPT

## 2019-10-26 PROCEDURE — 71045 X-RAY EXAM CHEST 1 VIEW: CPT | Mod: 26

## 2019-10-26 PROCEDURE — 84484 ASSAY OF TROPONIN QUANT: CPT

## 2019-10-26 RX ORDER — MECLIZINE HCL 12.5 MG
25 TABLET ORAL ONCE
Refills: 0 | Status: COMPLETED | OUTPATIENT
Start: 2019-10-26 | End: 2019-10-26

## 2019-10-26 RX ORDER — MECLIZINE HCL 12.5 MG
1 TABLET ORAL
Qty: 30 | Refills: 0
Start: 2019-10-26 | End: 2019-11-04

## 2019-10-26 RX ORDER — METOCLOPRAMIDE HCL 10 MG
10 TABLET ORAL ONCE
Refills: 0 | Status: COMPLETED | OUTPATIENT
Start: 2019-10-26 | End: 2019-10-26

## 2019-10-26 RX ADMIN — Medication 25 MILLIGRAM(S): at 19:55

## 2019-10-26 RX ADMIN — Medication 10 MILLIGRAM(S): at 19:55

## 2019-10-26 NOTE — ED ADULT NURSE NOTE - CHPI ED NUR SYMPTOMS NEG
no change in level of consciousness/no numbness/no vomiting/no blurred vision/no fever/no loss of consciousness/no confusion

## 2019-10-26 NOTE — ED PROVIDER NOTE - PROGRESS NOTE DETAILS
Gelacio Patterson MD: awaiting CT read at signout, symptoms improved after meclizine, anticipate dc with CT neg, mild bump in BNP, pt instructed to f/u cardiologist.

## 2019-10-26 NOTE — ED PROVIDER NOTE - CLINICAL SUMMARY MEDICAL DECISION MAKING FREE TEXT BOX
Pt with vertiginous sx resolved after meclizine, hx of CVA will CTA head and neck. Also some generalized weakness will check basic labs, UA, and cardiacs.

## 2019-10-26 NOTE — ED PROVIDER NOTE - ENMT, MLM
Mauc Instructions: By selecting yes to the question below the MAUC number will be added into the note.  This will be calculated automatically based on the diagnosis chosen, the size entered, the body zone selected (H,M,L) and the specific indications you chose. You will also have the option to override the Mohs AUC if you disagree with the automatically calculated number and this option is found in the Case Summary tab. Repair Hemostasis (Optional): Electrocoagulation Postop Diagnosis: same Graft Donor Site Will Heal By Secondary Intention: No Quadrants Reporting?: 0 Stage 7: Additional Anesthesia Type: 1% lidocaine with epinephrine Show Asc Variables: Yes Initial Size Of Lesion: 2.8 Referring Physician (Optional): Nabil Epidermal Closure Graft Donor Site (Optional): running Graft Basting Suture (Optional): 6-0 Fast Absorbing Gut Bilobed Transposition Flap Text: The defect edges were debeveled with a #15 scalpel blade.  Given the location of the defect and the proximity to free margins a bilobed transposition flap was deemed most appropriate.  Using a sterile surgical marker, an appropriate bilobe flap drawn around the defect.    The area thus outlined was incised deep to adipose tissue with a #15 scalpel blade.  The skin margins were undermined to an appropriate distance in all directions utilizing iris scissors. Medical Necessity Statement: Based on my medical judgement, Mohs surgery is the most appropriate treatment for this cancer compared to\\nother treatments. I discussed alternative treatments to Mohs surgery and specifically discussed the risks and\\nbenefits of curettage, excision with permanent sections, radiation therapy, and foregoing treatment. The rationale\\nfor Mohs surgery was explained to the patient and consent was obtained. The risks, benefits and alternatives to\\ntherapy were discussed in detail. Specifically, the risks of infection, scarring, bleeding, prolonged wound healing,\\nincomplete removal, allergy to anesthesia, nerve injury and recurrence were addressed. Prior to the procedure,\\nthe treatment site was clearly identified and confirmed by the patient. All components of Universal\\nProtocol/PAUSE Rule completed. All laboratory services were performed in the [facility], [address] [place] [address], [address], [place] [address].  Special stains are not necessarily approved by the U.S. Food and Drug Administration and are used to improve diagnostic accuracy. Location Indication Override (Is Already Calculated Based On Selected Body Location): Area M Body Location Override (Optional - Billing Will Still Be Based On Selected Body Map Location If Applicable): left frontal scalp Airway patent, Nasal mucosa clear. Mouth with normal mucosa. Throat has no vesicles, no oropharyngeal exudates and uvula is midline. Mohs Histo Method Verbiage: Each section was then chromacoded and processed in the Mohs lab using the Mohs protocol and submitted for frozen section. Referred To Mid-Level For Closure Text (Leave Blank If You Do Not Want): After obtaining clear surgical margins the patient was sent to a mid-level provider for surgical repair.  The patient understands they will receive post-surgical care and follow-up from the mid-level provider. Skin Substitute Text: The defect edges were debeveled with a #15 scalpel blade.  Given the location of the defect, shape of the defect and the proximity to free margins a skin substitute graft was deemed most appropriate.  The graft material was trimmed to fit the size of the defect. The graft was then placed in the primary defect and oriented appropriately. Star Wedge Flap Text: The defect edges were debeveled with a #15 scalpel blade.  Given the location of the defect, shape of the defect and the proximity to free margins a star wedge flap was deemed most appropriate.  Using a sterile surgical marker, an appropriate rotation flap was drawn incorporating the defect and placing the expected incisions within the relaxed skin tension lines where possible. The area thus outlined was incised deep to adipose tissue with a #15 scalpel blade.  The skin margins were undermined to an appropriate distance in all directions utilizing iris scissors. Closure 3 Information: This tab is for additional flaps and grafts above and beyond our usual structured repairs.  Please note if you enter information here it will not currently bill and you will need to add the billing information manually. Wound Check: 14 days Manual Repair Warning Statement: We plan on removing the manually selected variable below in favor of our much easier automatic structured text blocks found in the previous tab. We decided to do this to help make the flow better and give you the full power of structured data. Manual selection is never going to be ideal in our platform and I would encourage you to avoid using manual selection from this point on, especially since I will be sunsetting this feature. It is important that you do one of two things with the customized text below. First, you can save all of the text in a word file so you can have it for future reference. Second, transfer the text to the appropriate area in the Library tab. Lastly, if there is a flap or graft type which we do not have you need to let us know right away so I can add it in before the variable is hidden. No need to panic, we plan to give you roughly 6 months to make the change. Graft Donor Site Dermal Sutures (Optional): 4-0 Monocryl Wound Care: Polysporin ointment Detail Level: Detailed Dressing: pressure dressing with telfa Anesthesia Volume In Cc: 4.2 Estimated Blood Loss (Cc): minimal Stage 2: Number Of Blocks?: 1 Primary Defect Width In Cm (Final Defect Size - Required For Flaps/Grafts): 3 Anesthesia Type: 1% lidocaine with epinephrine and a 1:10 solution of 8.4% sodium bicarbonate Post-Care Instructions: I reviewed in detail the post-care instructions. Unna Boot Text: An Unna boot was placed to help immobilize the limb and facilitate more rapid healing. Simple / Intermediate / Complex Repair - Final Wound Length In Cm: 8.4 Mohs Case Number: [id number] Partial Purse String (Simple) Text: Given the location of the defect and the characteristics of the surrounding skin a simple purse string closure was deemed most appropriate.  Undermining was performed circumfirentially around the surgical defect.  A purse string suture was then placed and tightened. Wound tension only allowed a partial closure of the circular defect. Keystone Flap Text: The defect edges were debeveled with a #15 scalpel blade.  Given the location of the defect, shape of the defect a keystone flap was deemed most appropriate.  Using a sterile surgical marker, an appropriate keystone flap was drawn incorporating the defect, outlining the appropriate donor tissue and placing the expected incisions within the relaxed skin tension lines where possible. The area thus outlined was incised deep to adipose tissue with a #15 scalpel blade.  The skin margins were undermined to an appropriate distance in all directions around the primary defect and laterally outward around the flap utilizing iris scissors. Closure 4 Information: This tab is for additional flaps and grafts above and beyond our usual structured repairs.  Please note if you enter information here it will not currently bill and you will need to add the billing information manually. X Size Of Lesion In Cm (Optional): 2 Surgeon/Pathologist Verbiage (Will Incorporate Name Of Surgeon From Intro If Not Blank): operated in two distinct and integrated capacities as the surgeon and pathologist. Tarsorrhaphy Text: A tarsorrhaphy was performed using Frost sutures. Surgeon: Dionicio Jones M.D. Anesthesia Volume In Cc: 6 Closure 2 Information: This tab is for additional flaps and grafts, including complex repair and grafts and complex repair and flaps. You can also specify a different location for the additional defect, if the location is the same you do not need to select a new one. We will insert the automated text for the repair you select below just as we do for solitary flaps and grafts. Please note that at this time if you select a location with a different insurance zone you will need to override the ICD10 and CPT if appropriate. Area M Indication Text: Tumors in this location are included in\\nArea M (cheek, forehead, scalp, neck, jawline and pretibial skin). Complex Repair And Flap Additional Text (Will Appearing After The Standard Complex Repair Text): The complex repair was not sufficient to completely close the primary defect. The remaining additional defect was repaired with the flap mentioned below. Primary Defect Length In Cm (Final Defect Size - Required For Flaps/Grafts): 4.6 Eye Protection Verbiage: Before proceeding with the stage, a plastic scleral shield was inserted. The globe was anesthetized with a few drops of 1% lidocaine with 1:100,000 epinephrine. Then, an appropriate sized scleral shield was chosen and coated with lacrilube ointment. The shield was gently inserted and left in place for the duration of each stage. After the stage was completed, the shield was gently removed. Consent (Lip)/Introductory Paragraph: The rationale for Mohs was explained to the patient and consent was obtained. The risks, benefits and alternatives to therapy were discussed in detail. Specifically, the risks of lip deformity, changes in the oral aperture, infection, scarring, bleeding, prolonged wound healing, incomplete removal, allergy to anesthesia, nerve injury and recurrence were addressed. Prior to the procedure, the treatment site was clearly identified and confirmed by the patient. All components of Universal Protocol/PAUSE Rule completed. Partial Purse String (Intermediate) Text: Given the location of the defect and the characteristics of the surrounding skin an intermediate purse string closure was deemed most appropriate.  Undermining was performed circumfirentially around the surgical defect.  A purse string suture was then placed and tightened. Wound tension only allowed a partial closure of the circular defect. Stage 3: Additional Anesthesia Volume In Cc: 2.2 Muscle Hinge Flap Text: The defect edges were debeveled with a #15 scalpel blade.  Given the size, depth and location of the defect and the proximity to free margins a muscle hinge flap was deemed most appropriate.  Using a sterile surgical marker, an appropriate hinge flap was drawn incorporating the defect. The area thus outlined was incised with a #15 scalpel blade.  The skin margins were undermined to an appropriate distance in all directions utilizing iris scissors. Stage 3: Additional Anesthesia Type: 1% lidocaine with 1:100,000 epinephrine and a 1:10 solution of 8.4% sodium bicarbonate Area L Indication Text: Tumors in this location are included in\\nArea L (trunk and extremities). Repair Type: Complex Repair Subsequent Stages Histo Method Verbiage: The area was prepped in the usual fashion. Using a similar technique to that described above, a thin\\nlayer of tissue was removed from all areas where tumor was visible on the previous stage.  The tissue was again\\noriented, mapped, dyed, and processed as above. After hemostasis, the specimen was oriented, mapped and\\ndivided Secondary Intention Text (Leave Blank If You Do Not Want): The defect will heal with secondary intention. Repair Anesthesia Method: local infiltration Epidermal Closure: running and interrupted Complex Repair And Graft Additional Text (Will Appearing After The Standard Complex Repair Text): The complex repair was not sufficient to completely close the primary defect. The remaining additional defect was repaired with the graft mentioned below. Area H Indication Text: Tumors in this location are included in\\nArea H (central face, eyelids, canthi, eyebrows, nose, lips, chin, ears, genitals, hands, feet, nail units, ankles, nipples and areola). Surgeon Performing Repair (Optional): Dionicio Jones M.D. Consent Type: Consent 1 (Standard) Tumor Debulked?: curette Mohs Method Verbiage: An incision at a 45 degree angle following the standard Mohs\\napproach was done and the specimen was harvested as a microscopically controlled layer.

## 2019-10-26 NOTE — ED ADULT TRIAGE NOTE - CHIEF COMPLAINT QUOTE
Pt has hx of CVA in the past with some residual right sided weakness. Pt reports feeling dizziness and generalized fatigue/weakness starting today.  Pt recently on steroids for mouth sore that ended today.  And pt reports travel from [place] 2 weeks ago by car.  Denies any new lower extremity edema or pain, but reports B/L LE weakness.

## 2019-10-26 NOTE — ED PROVIDER NOTE - NSFOLLOWUPINSTRUCTIONS_ED_ALL_ED_FT
Vertigo  Vertigo is the feeling that you or your surroundings are moving when they are not. Vertigo can be dangerous if it occurs while you are doing something that could endanger you or others, such as driving.  What are the causes?  This condition is caused by a disturbance in the signals that are sent by your body’s sensory systems to your brain. Different causes of a disturbance can lead to vertigo, including:  Infections, especially in the inner ear.A bad reaction to a drug, or misuse of alcohol and medicines.Withdrawal from drugs or alcohol.Quickly changing positions, as when lying down or rolling over in bed.Migraine headaches.Decreased blood flow to the brain.Decreased blood pressure.Increased pressure in the brain from a head or neck injury, stroke, infection, tumor, or bleeding.Central nervous system disorders.What are the signs or symptoms?  Symptoms of this condition usually occur when you move your head or your eyes in different directions. Symptoms may start suddenly, and they usually last for less than a minute. Symptoms may include:  Loss of balance and falling.Feeling like you are spinning or moving.Feeling like your surroundings are spinning or moving.Nausea and vomiting.Blurred vision or double vision.Difficulty hearing.Slurred speech.Dizziness.Involuntary eye movement (nystagmus).Symptoms can be mild and cause only slight annoyance, or they can be severe and interfere with daily life. Episodes of vertigo may return (recur) over time, and they are often triggered by certain movements. Symptoms may improve over time.  How is this diagnosed?  This condition may be diagnosed based on medical history and the quality of your nystagmus. Your health care provider may test your eye movements by asking you to quickly change positions to trigger the nystagmus. This may be called the [place]-Hallpike test, head thrust test, or roll test. You may be referred to a health care provider who specializes in ear, nose, and throat (ENT) problems (otolaryngologist) or a provider who specializes in disorders of the central nervous system (neurologist).  You may have additional testing, including:  A physical exam.Blood tests.MRI.A CT scan.An electrocardiogram (ECG). This records electrical activity in your heart.An electroencephalogram (EEG). This records electrical activity in your brain.Hearing tests.How is this treated?  Treatment for this condition depends on the cause and the severity of the symptoms. Treatment options include:  Medicines to treat nausea or vertigo. These are usually used for severe cases. Some medicines that are used to treat other conditions may also reduce or eliminate vertigo symptoms. These include:  Medicines that control allergies (antihistamines).Medicines that control seizures (anticonvulsants).Medicines that relieve depression (antidepressants).Medicines that relieve anxiety (sedatives).Head movements to adjust your inner ear back to normal. If your vertigo is caused by an ear problem, your health care provider may recommend certain movements to correct the problem.Surgery. This is rare.Follow these instructions at home:  Safety     Move slowly.Avoid sudden body or head movements.Avoid driving.Avoid operating heavy machinery.Avoid doing any tasks that would cause danger to you or others if you would have a vertigo episode during the task.If you have trouble walking or keeping your balance, try using a cane for stability. If you feel dizzy or unstable, sit down right away.Return to your normal activities as told by your health care provider. Ask your health care provider what activities are safe for you.General instructions     Take over-the-counter and prescription medicines only as told by your health care provider.Avoid certain positions or movements as told by your health care provider.Drink enough fluid to keep your urine clear or pale yellow.Keep all follow-up visits as told by your health care provider. This is important.Contact a health care provider if:  Your medicines do not relieve your vertigo or they make it worse.You have a fever.Your condition gets worse or you develop new symptoms.Your family or friends notice any behavioral changes.Your nausea or vomiting gets worse.You have numbness or a “pins and needles” sensation in part of your body.Get help right away if:  You have difficulty moving or speaking.You are always dizzy.You faint.You develop severe headaches.You have weakness in your hands, arms, or legs.You have changes in your hearing or vision.You develop a stiff neck.You develop sensitivity to light.This information is not intended to replace advice given to you by your health care provider. Make sure you discuss any questions you have with your health care provider.    Document Released: 09/27/2006 Document Revised: 05/31/2017 Document Reviewed: 04/11/2016  [employer] Patient Education © 2019 [employer]

## 2019-10-26 NOTE — ED PROVIDER NOTE - OBJECTIVE STATEMENT
73 y/o female with PMHx of HLD, CHF, CAD, DVT, HTN, and s/p hysterectomy presents to the ED c/o constant +dizziness beginning this morning but since resolved. Describes dizziness as room spinning. Also notes +b/l lower extremity edema. As per daughter at bedside pt also with +weakness and +difficulty ambulating today. Denies chest pain or fever. Recent travel to [place] 2 weeks ago by car. Completed course of prednisone for mouth sore today. Never a smoker. Allergic to penicillin. PCP: Dr. Melecio Carson.

## 2019-10-26 NOTE — ED ADULT NURSE NOTE - PMH
Asthmatic bronchitis    Behcet's syndrome    CHF (congestive heart failure)    CVA (cerebral vascular accident)    DVT (deep venous thrombosis)    HLD (hyperlipidemia)    HTN (hypertension)    Lumbago    Nonintractable epilepsy without status epilepticus  2/23/2018  Scoliosis

## 2019-10-26 NOTE — ED STATDOCS - PROGRESS NOTE DETAILS
Sunita HARRIS for ED attending Dr. Lee: 74 y/o female presents to ED c/o dizziness and generalized weakness and fatigue starting today, reports today is the third day with persistent symptoms. Increased right sided weakness. Pt recently on steroids for mouth sore that ended today. Has a hx of dizziness, but recently the dizziness has been increasing. Also notes lower extremity edema. Recent travel to [place] x2 weeks ago by car. Denies chest pain. Allergies to penicillin. PMD: Melecio Carson. Will send pt to main ED for further evaluation. Sunita HARRIS for ED attending Dr. Lee: 73 yoF presents to ED c/o dizziness and generalized weakness and fatigue starting today, reports today is the third day with persistent symptoms. Increased right sided weakness. Pt recently on steroids for mouth sore that ended today. Has a hx of vertigo due to "increased fluid behind ears" but recently the dizziness has been increasing. Also notes lower extremity edema. Recent travel to [place] x2 weeks ago by car. Denies chest pain. PMD: Melecio Carson. Will send pt to main ED for further evaluation since pt with unsteady gait

## 2019-10-26 NOTE — ED PROVIDER NOTE - PATIENT PORTAL LINK FT
You can access the FollowMyHealth Patient Portal offered by [facility] by registering at the following website: [email or web address]. By joining [employer]’s FollowMyHealth portal, you will also be able to view your health information using other applications (apps) compatible with our system.

## 2019-10-26 NOTE — ED ADULT NURSE NOTE - OBJECTIVE STATEMENT
Pt presents to ED c/o dizziness/nausea/weakness since this morning. Pt reports recent travel 2 weeks ago from [facility] by car. Denies CP, SOB, fever, vomiting, diarrhea. PM HX CVA on coumadin, HTN, CHF.

## 2019-10-27 PROBLEM — I50.9 HEART FAILURE, UNSPECIFIED: Chronic | Status: ACTIVE | Noted: 2018-07-12

## 2019-10-27 PROBLEM — E78.5 HYPERLIPIDEMIA, UNSPECIFIED: Chronic | Status: ACTIVE | Noted: 2018-07-12

## 2019-11-01 ENCOUNTER — OUTPATIENT (OUTPATIENT)
Dept: OUTPATIENT SERVICES | Facility: HOSPITAL | Age: 74
LOS: 1 days | End: 2019-11-01
Payer: MEDICARE

## 2019-11-01 DIAGNOSIS — Z90.710 ACQUIRED ABSENCE OF BOTH CERVIX AND UTERUS: Chronic | ICD-10-CM

## 2019-11-01 PROCEDURE — G9001: CPT

## 2019-11-15 DIAGNOSIS — Z71.89 OTHER SPECIFIED COUNSELING: ICD-10-CM

## 2019-11-15 PROBLEM — M41.9 SCOLIOSIS, UNSPECIFIED: Chronic | Status: ACTIVE | Noted: 2019-10-26

## 2019-11-15 PROBLEM — M54.5 LOW BACK PAIN: Chronic | Status: ACTIVE | Noted: 2019-10-26

## 2019-11-15 PROBLEM — G40.909 EPILEPSY, UNSPECIFIED, NOT INTRACTABLE, WITHOUT STATUS EPILEPTICUS: Chronic | Status: ACTIVE | Noted: 2019-10-26

## 2019-11-15 PROBLEM — J45.909 UNSPECIFIED ASTHMA, UNCOMPLICATED: Chronic | Status: ACTIVE | Noted: 2019-10-26

## 2019-11-15 PROBLEM — M35.2 BEHCET'S DISEASE: Chronic | Status: ACTIVE | Noted: 2019-10-26

## 2019-11-19 ENCOUNTER — EMERGENCY (EMERGENCY)
Facility: HOSPITAL | Age: 74
LOS: 0 days | Discharge: HOME | End: 2019-11-20
Attending: EMERGENCY MEDICINE
Payer: MEDICARE

## 2019-11-19 VITALS
HEIGHT: 66 IN | DIASTOLIC BLOOD PRESSURE: 79 MMHG | RESPIRATION RATE: 18 BRPM | OXYGEN SATURATION: 97 % | WEIGHT: 210.1 LBS | SYSTOLIC BLOOD PRESSURE: 185 MMHG | HEART RATE: 68 BPM | TEMPERATURE: 98 F

## 2019-11-19 DIAGNOSIS — Z86.718 PERSONAL HISTORY OF OTHER VENOUS THROMBOSIS AND EMBOLISM: ICD-10-CM

## 2019-11-19 DIAGNOSIS — I11.0 HYPERTENSIVE HEART DISEASE WITH HEART FAILURE: ICD-10-CM

## 2019-11-19 DIAGNOSIS — Z86.73 PERSONAL HISTORY OF TRANSIENT ISCHEMIC ATTACK (TIA), AND CEREBRAL INFARCTION WITHOUT RESIDUAL DEFICITS: ICD-10-CM

## 2019-11-19 DIAGNOSIS — Z79.01 LONG TERM (CURRENT) USE OF ANTICOAGULANTS: ICD-10-CM

## 2019-11-19 DIAGNOSIS — Z79.899 OTHER LONG TERM (CURRENT) DRUG THERAPY: ICD-10-CM

## 2019-11-19 DIAGNOSIS — R51 HEADACHE: ICD-10-CM

## 2019-11-19 DIAGNOSIS — E78.5 HYPERLIPIDEMIA, UNSPECIFIED: ICD-10-CM

## 2019-11-19 DIAGNOSIS — I50.9 HEART FAILURE, UNSPECIFIED: ICD-10-CM

## 2019-11-19 DIAGNOSIS — Z90.710 ACQUIRED ABSENCE OF BOTH CERVIX AND UTERUS: Chronic | ICD-10-CM

## 2019-11-19 PROCEDURE — 85610 PROTHROMBIN TIME: CPT

## 2019-11-19 PROCEDURE — 93005 ELECTROCARDIOGRAM TRACING: CPT

## 2019-11-19 PROCEDURE — 71045 X-RAY EXAM CHEST 1 VIEW: CPT | Mod: 26

## 2019-11-19 PROCEDURE — 70450 CT HEAD/BRAIN W/O DYE: CPT | Mod: 26

## 2019-11-19 PROCEDURE — 85027 COMPLETE CBC AUTOMATED: CPT

## 2019-11-19 PROCEDURE — 99284 EMERGENCY DEPT VISIT MOD MDM: CPT | Mod: 25

## 2019-11-19 PROCEDURE — 36415 COLL VENOUS BLD VENIPUNCTURE: CPT

## 2019-11-19 PROCEDURE — 70450 CT HEAD/BRAIN W/O DYE: CPT

## 2019-11-19 PROCEDURE — 85730 THROMBOPLASTIN TIME PARTIAL: CPT

## 2019-11-19 PROCEDURE — 71045 X-RAY EXAM CHEST 1 VIEW: CPT

## 2019-11-19 PROCEDURE — 80053 COMPREHEN METABOLIC PANEL: CPT

## 2019-11-19 PROCEDURE — 84484 ASSAY OF TROPONIN QUANT: CPT

## 2019-11-19 PROCEDURE — 99284 EMERGENCY DEPT VISIT MOD MDM: CPT

## 2019-11-19 NOTE — ED ADULT TRIAGE NOTE - CHIEF COMPLAINT QUOTE
c/o headache, generalized weakness & elevated BP since this morning. Hx stroke 10 months ago & HTN. On Coozar 50mg x1day. Pts daughter gave additional Coozar 25mg 20 mins ago.

## 2019-11-20 VITALS
SYSTOLIC BLOOD PRESSURE: 154 MMHG | RESPIRATION RATE: 16 BRPM | DIASTOLIC BLOOD PRESSURE: 85 MMHG | TEMPERATURE: 98 F | OXYGEN SATURATION: 96 % | HEART RATE: 58 BPM

## 2019-11-20 LAB
ALBUMIN SERPL ELPH-MCNC: 3 G/DL — LOW (ref 3.3–5)
ALP SERPL-CCNC: 85 U/L — SIGNIFICANT CHANGE UP (ref 40–120)
ALT FLD-CCNC: 40 U/L — SIGNIFICANT CHANGE UP (ref 12–78)
ANION GAP SERPL CALC-SCNC: 3 MMOL/L — LOW (ref 5–17)
APTT BLD: 30.4 SEC — SIGNIFICANT CHANGE UP (ref 27.5–36.3)
AST SERPL-CCNC: 20 U/L — SIGNIFICANT CHANGE UP (ref 15–37)
BILIRUB SERPL-MCNC: 0.4 MG/DL — SIGNIFICANT CHANGE UP (ref 0.2–1.2)
BUN SERPL-MCNC: 24 MG/DL — HIGH (ref 7–23)
CALCIUM SERPL-MCNC: 8.9 MG/DL — SIGNIFICANT CHANGE UP (ref 8.5–10.1)
CHLORIDE SERPL-SCNC: 109 MMOL/L — HIGH (ref 96–108)
CO2 SERPL-SCNC: 29 MMOL/L — SIGNIFICANT CHANGE UP (ref 22–31)
CREAT SERPL-MCNC: 0.8 MG/DL — SIGNIFICANT CHANGE UP (ref 0.5–1.3)
GLUCOSE SERPL-MCNC: 133 MG/DL — HIGH (ref 70–99)
HCT VFR BLD CALC: 39.3 % — SIGNIFICANT CHANGE UP (ref 34.5–45)
HGB BLD-MCNC: 11.4 G/DL — LOW (ref 11.5–15.5)
INR BLD: 1.1 RATIO — SIGNIFICANT CHANGE UP (ref 0.88–1.16)
MCHC RBC-ENTMCNC: 19.5 PG — LOW (ref 27–34)
MCHC RBC-ENTMCNC: 29 GM/DL — LOW (ref 32–36)
MCV RBC AUTO: 67.3 FL — LOW (ref 80–100)
PLATELET # BLD AUTO: 174 K/UL — SIGNIFICANT CHANGE UP (ref 150–400)
POTASSIUM SERPL-MCNC: 3.6 MMOL/L — SIGNIFICANT CHANGE UP (ref 3.5–5.3)
POTASSIUM SERPL-SCNC: 3.6 MMOL/L — SIGNIFICANT CHANGE UP (ref 3.5–5.3)
PROT SERPL-MCNC: 6.1 GM/DL — SIGNIFICANT CHANGE UP (ref 6–8.3)
PROTHROM AB SERPL-ACNC: 12.2 SEC — SIGNIFICANT CHANGE UP (ref 10–12.9)
RBC # BLD: 5.84 M/UL — HIGH (ref 3.8–5.2)
RBC # FLD: 16.9 % — HIGH (ref 10.3–14.5)
SODIUM SERPL-SCNC: 141 MMOL/L — SIGNIFICANT CHANGE UP (ref 135–145)
TROPONIN I SERPL-MCNC: 0.02 NG/ML — SIGNIFICANT CHANGE UP (ref 0.01–0.04)
TROPONIN I SERPL-MCNC: 0.02 NG/ML — SIGNIFICANT CHANGE UP (ref 0.01–0.04)
WBC # BLD: 8.97 K/UL — SIGNIFICANT CHANGE UP (ref 3.8–10.5)
WBC # FLD AUTO: 8.97 K/UL — SIGNIFICANT CHANGE UP (ref 3.8–10.5)

## 2019-11-20 PROCEDURE — 93010 ELECTROCARDIOGRAM REPORT: CPT

## 2019-11-20 NOTE — ED PROVIDER NOTE - OBJECTIVE STATEMENT
73 yo female brought in by daughter for headache and high sbp at home of 220. She took an extra half of coreg tonight. no c/o chest pain or difficulty breathing

## 2019-11-20 NOTE — ED ADULT NURSE NOTE - OBJECTIVE STATEMENT
patient axox3, c/o headache, generalized weakness & elevated BP since this morning. Hx stroke 10 months ago & HTN. patient states she took her bp at home and had a systolic pressure in the low 200s. On Coozar 50mg x1day. Pts daughter gave additional Coozar 25mg 20 mins ago. patient denies vision changes, n/v/d, fever, chills, chest pain, SOB.

## 2019-11-20 NOTE — ED ADULT NURSE REASSESSMENT NOTE - NS ED NURSE REASSESS COMMENT FT1
patient able to ambulate with a steady gait. patient cleared to be discharged by dr. yip, discharge to be completed.

## 2019-11-20 NOTE — ED PROVIDER NOTE - PATIENT PORTAL LINK FT
You can access the FollowMyHealth Patient Portal offered by [facility] by registering at the following website: [email or web address]. By joining [facility]’s FollowMyHealth portal, you will also be able to view your health information using other applications (apps) compatible with our system.

## 2020-02-03 NOTE — ED PROVIDER NOTE - NIH STROKE SCALE 1A. LEVEL OF CONSCIOUSNESS
Chief complaint:   Chief Complaint   Patient presents with   • Congestion       Vitals:  Visit Vitals  /68   Pulse 96   Temp 100.1 °F (37.8 °C) (Oral)   Resp 16   Ht 5' 8\" (1.727 m)   Wt 53.7 kg   LMP 10/03/2019 (Approximate)   SpO2 100%   BMI 18.00 kg/m²       HISTORY OF PRESENT ILLNESS     This is a 18-year-old female who presents with mom being seen for evaluation of cold-like symptoms.  Patient states symptoms started 4 days ago.  She 1st started with a sore throat.  She has felt feverish and chilled.  Her temperature checks at home have been primarily in the 98 degree range.  She notes she has felt fatigued.  She has had congestion and her ears feel plugged.  She developed a dry cough yesterday.  She has been taking Tylenol and a over-the-counter sinus medication.  She notes her friend has been sick with cold-like symptoms.  She did receive the flu vaccine this year.      Other significant problems:  There are no active problems to display for this patient.      PAST MEDICAL, FAMILY AND SOCIAL HISTORY     Medications:  Current Outpatient Medications   Medication   • Aspirin-Acetaminophen-Caffeine (EXCEDRIN PO)   • ondansetron (ZOFRAN ODT) 4 MG disintegrating tablet   • melatonin 3 MG     No current facility-administered medications for this visit.        Allergies:  ALLERGIES:  No Known Allergies    Past Medical  History/Surgeries:  Past Medical History:   Diagnosis Date   • Articulation delay    • Bronchospasm     age 8   • RAD (reactive airway disease)    • Sepsis, viral (CMS/HCC)     age 4, hosp [facility]   • Vitamin D insufficiency 10/9/14       Past Surgical History:   Procedure Laterality Date   • Past surgical history  none    none       Family History:  Family History   Problem Relation Age of Onset   • Diabetes Father 23        metformin 2000 mg/d   • Hyperlipidemia Father    • Neurological Disorder Mother 42        MS, 4/14; failed Copaxone; now on Tysbari   • Psychiatry Mother         depression, on  Paxil   • Thyroid Mother         hypothyroid   • Genitourinary Mother         renal calculi during pregnancy   • Allergies Paternal Aunt    • Alcohol Abuse Paternal Grandmother         EtOH   • Diabetes Paternal Grandmother    • Hypertension Paternal Grandmother    • Alcohol Abuse Paternal Grandfather         EtOH   • Hypertension Paternal Grandfather    • Heart Paternal Grandfather 57        MI   • Hyperlipidemia Paternal Grandfather    • Migraine Paternal Aunt    • Migraine Paternal Aunt         multiple suicide attempts       Social History:  Social History     Tobacco Use   • Smoking status: Never Smoker   • Smokeless tobacco: Never Used   Substance Use Topics   • Alcohol use: No     Alcohol/week: 0.0 standard drinks       REVIEW OF SYSTEMS     Review of Systems   Constitutional: Positive for chills, fatigue and fever. Negative for activity change and appetite change.   HENT: Positive for congestion and sore throat. Negative for drooling, ear discharge, ear pain, postnasal drip, rhinorrhea, sinus pressure, sinus pain, trouble swallowing and voice change.    Respiratory: Positive for cough. Negative for apnea, chest tightness, shortness of breath and wheezing.    Cardiovascular: Negative.    Gastrointestinal: Negative for abdominal pain, diarrhea, nausea and vomiting.   Genitourinary: Negative for decreased urine volume and difficulty urinating.   Musculoskeletal: Negative for myalgias.   Skin: Negative for rash.   Hematological: Negative for adenopathy.       PHYSICAL EXAM     Physical Exam  Vitals signs and nursing note reviewed.   Constitutional:       General: She is not in acute distress.     Appearance: Normal appearance. She is well-developed. She is not ill-appearing.      Comments: Patient texting on her phone throughout visit.   HENT:      Head: Normocephalic and atraumatic.      Jaw: No trismus.      Right Ear: Tympanic membrane, ear canal and external ear normal.      Left Ear: Tympanic membrane, ear  canal and external ear normal.      Nose: Congestion present.      Right Sinus: No maxillary sinus tenderness or frontal sinus tenderness.      Left Sinus: No maxillary sinus tenderness or frontal sinus tenderness.      Mouth/Throat:      Mouth: Mucous membranes are moist.      Pharynx: Uvula midline. Posterior oropharyngeal erythema (mild) present. No pharyngeal swelling or oropharyngeal exudate.      Tonsils: No tonsillar exudate or tonsillar abscesses.   Eyes:      Extraocular Movements: Extraocular movements intact.      Conjunctiva/sclera: Conjunctivae normal.      Pupils: Pupils are equal, round, and reactive to light.   Neck:      Musculoskeletal: Normal range of motion and neck supple.      Trachea: Phonation normal.   Cardiovascular:      Rate and Rhythm: Normal rate and regular rhythm.   Pulmonary:      Effort: Pulmonary effort is normal.      Breath sounds: Normal breath sounds.   Lymphadenopathy:      Cervical: No cervical adenopathy.   Skin:     General: Skin is warm and dry.      Findings: No rash.   Neurological:      Mental Status: She is alert and oriented to person, place, and time.   Psychiatric:         Mood and Affect: Mood normal.       Urgent Care Course:  Results for orders placed or performed in visit on 02/03/20   POCT RAPID STREP A   Result Value    GRP A STREP Negative    Internal Procedural Controls Acceptable Yes       Tylenol/ibuprofen offered and patient declined.  ASSESSMENT/PLAN     Marcia was seen today for congestion.    Diagnoses and all orders for this visit:    Pharyngitis, unspecified etiology  -     POCT RAPID STREP A  -     STREPTOCOCCUS GROUP A (STREPTOCOCCUS PYOGENES), BACTERIAL CULTURE    Viral URI       Rapid strep negative.   Strep culture pending. Parent will be called with results in the next 2-3 days.    Rest, drink plenty of fluids.  Throat lozenges, warm salt gargles, tea with honey for sore throat.    You may find the following helpful to reduce symptoms (generic  medications are fine) do not take any of the below list of medications if you are allergic to any of the ingredients.  · Congestion: may use 4 hour sudafed tablets during the day, last dose by 6PM follow package instructions. Zyrtec or Claritin daily for next 7-10 days. Afrin nasal spray at night, follow package directions, do not use more that 3 days. Do not use Sudafed and Afrin at the same time as they are both decongestants  · Fever and/or body aches: Tylenol (acetaminophen) or Advil (Ibuprofen), do not exceed package dosing, always take ibuprofen with food  · Mucinex (guaifenicin) as directed on the package to thin secretions.  · Delsym (dextromethorphan) for cough  · A combination of guaifenesin and dextromethorphan is sold as Mucinex DM  · Oceans (saline) nasal spray, one spray in each nostril hourly for congestion.  · Put a cool mist vaporizer in your bedroom.   · Elevate the head of your bed, or use multiple pillows to help with sinus drainage.    Written Instructions Provided to the patient and reviewed in detail, all questions answered.    Follow up with Primary MD in the next 3-5 days if no improvement -sooner if worse. Return or go to the ER with any worsening symptoms, problems, concerns.    Patient acknowledged shared decision making at the end of our conversation.       (0) Alert; keenly responsive

## 2020-07-20 NOTE — ED ADULT NURSE NOTE - FALL HARM RISK
Patient states that his labs were coded incorrectly, they can not be changed, the MD needs to addend them in order to get them changed.  Patient had been working with Ayleen last week.  The problem is that he has different insurance because he was laid off.   coagulation(Bleeding disorder R/T clinical cond/anti-coags)

## 2020-10-06 NOTE — ED ADULT TRIAGE NOTE - RESPIRATORY RATE (BREATHS/MIN)
Dr. Walker has scheduled a Prostate MRI for you. Central scheduling should contact you to arrange this appointment. If however you have not been contacted in 3-5 days, please feel free to call [phone].    Once your MRI is scheduled, please contact our office and schedule a follow-up appointment to review your results.   18

## 2020-10-15 NOTE — PATIENT PROFILE ADULT. - COMFORT LEVEL, ACCEPTABLE
[facility] SHORT STAY DISCHARGE SUMMARY NOTE    SUBJECTIVE:   Patient seen and examined. Patient states he is doing well and reports no shortness of breath with ambulation. He states no calf pain, howver notes mild discomfort still present. Notes no history of high hemoglobin. He denies leg swelling, chest pain or bleeding noted.       OBJECTIVE:    VITAL SIGNS:     Vital Last Value 24 Hour Range   Temperature 98.1 °F (36.7 °C) (10/15/20 0739) Temp  Min: 97.9 °F (36.6 °C)  Max: 98.6 °F (37 °C)   Pulse 86 (10/15/20 0739) Pulse  Min: 86  Max: 109   Respiratory 18 (10/15/20 0739) Resp  Min: 18  Max: 20   Non-Invasive  Blood Pressure (!) 158/94 (10/15/20 0739) BP  Min: 124/88  Max: 158/94   Pulse Oximetry 96 % (10/15/20 0739) SpO2  Min: 95 %  Max: 97 %     Vital Today Admitted   Weight 103.5 kg (228 lb 2.8 oz) (10/15/20 0034) Weight: 106.6 kg (235 lb) (10/14/20 1053)   Height N/A Height: 5' 10\" (177.8 cm) (10/14/20 1053)   Body Mass Index N/A BMI (Calculated): 33.72 (10/14/20 1053)     INTAKE/OUTPUT:    No intake or output data in the 24 hours ending 10/15/20 0958      PHYSICAL EXAM:    Physical Exam   Constitutional: He is oriented to person, place, and time. No distress.   Eyes: Conjunctivae are normal.   Cardiovascular: Normal rate and regular rhythm.   Pulmonary/Chest: Breath sounds normal. No respiratory distress.   Abdominal: Soft. He exhibits no distension. There is no abdominal tenderness.   Musculoskeletal:         General: No edema.   Neurological: He is alert and oriented to person, place, and time.   Skin: Skin is warm and dry.        LABORATORY DATA:    CHEM7:  Sodium (mmol/L)   Date Value   10/15/2020 142     Potassium (mmol/L)   Date Value   10/15/2020 5.1     Chloride (mmol/L)   Date Value   10/15/2020 106     Glucose (mg/dL)   Date Value   10/15/2020 103 (H)     CALCIUM (mg/dL)   Date Value   10/15/2020 9.2     Carbon Dioxide (mmol/L)   Date Value   10/15/2020 30     BUN (mg/dL)   Date Value   10/15/2020 18      Creatinine (mg/dL)   Date Value   10/15/2020 1.17       CBC:  WBC (K/mcL)   Date Value   10/15/2020 8.8     RBC (mil/mcL)   Date Value   10/15/2020 5.32     HCT (%)   Date Value   10/15/2020 52.2 (H)     HGB (g/dL)   Date Value   10/15/2020 17.5 (H)     PLT (K/mcL)   Date Value   10/15/2020 198      Coags:  No results found for: INR     Hepatic Panel:  AST/SGOT (Units/L)   Date Value   10/14/2020 21     ALT/SGPT (Units/L)   Date Value   10/14/2020 48     No results found for: GGTP  ALK PHOSPHATASE (Units/L)   Date Value   10/14/2020 45     TOTAL BILIRUBIN (mg/dL)   Date Value   10/14/2020 0.5         IMAGING STUDIES:    CTA CHEST PULMONARY EMBOLISM W CONTRAST   Final Result   High clot burden extensive central pulmonary thromboembolism bilaterally.  RV/LV ratio of one with slight flattening of interventricular septum raises possibility of right ventricular strain.  Few scattered generally subpleural micronodules    bilaterally one of which calcified likely reflects old granulomatous infection.  Mediastinum and aj without evidence of focal mass or lymphadenopathy. Incidental tiny 4 mm hypodensity lateral right mid kidney likely related to cyst although too small    to characterize.         Code 8 -  Dr. Sanchez  was notified of critical results on 10/14/2020 at 2:10 PM.      Electronically Signed by: ANANDA CLEMENS M.D.    Signed on: 10/14/2020 2:10 PM          [facility] EXTREMITY LOWER VENOUS DUPLEX   Final Result   Positive for deep venous thrombosis in the right proximal to mid posterior tibial vein.      Findings were communicated by Dr. Kebede to Dr. Sanchez at 1:05 PM on 10/14/2020.      Electronically Signed by: TONYA KEBEDE M.D.    Signed on: 10/14/2020 1:08 PM          XR CHEST PA OR AP 1 VIEW   Final Result   FINDINGS/IMPRESSION: The cardiomediastinal silhouette is within normal limits.  There is no focal consolidation or pleural effusion.  Degenerative changes of the thoracic spine are present.       Electronically Signed by: TONYA ELDER M.D.    Signed on: 10/14/2020 12:11 PM               ASSESSMENT/PLAN:    Bilateral pulmonary embolism with RLE DVT   BLE Venous Doppler (10/14) noted deep venous thrombosis in the right proximal to mid posterior tibial vein   CTA Chest PE (10/14) as noted above    Echo ordered, awaiting results   AC via Lovenox sq, switch to Eliquis on discharge starting tonight   Hypercoagulable work-up ordered   Pulmonology consulted, discussed with Dr. Simmons, will need lifelong anticoagulation,    Primary hypertension   Continued lisinopril-HCTZ      Elevated hemoglobin, etiology unclear   Hgb 17.6 --> 17.5 (10/15)   Order given for having sleep study done outpatient   JAK2 mutation ordered     Hyponatremia - resolved     Hyperlipidemia - Continued atorvastatin    Code Status    Code Status: Not on file    DVT PPX: Lovenox sq, switch to Eliquis on discharge    DISPOSITION: Discharge home today on oral AC today.    PCP:  Richard Gonzales MD updated via epic regarding pending labs and above      DISCHARGE INSTRUCTIONS:  ADMISSION DATE: 10/15/2020  DISCHARGE DATE: 10/16/2020  DISCHARGE TO: Home   DIET: Cardiac  ACTIVITY: As tolerated  HOME HEALTH CARE RN/PT/OT/MSW/Wound/Ostomy Agency: None  HOME OXYGEN: None    FOLLOW-UP APPOINTMENTS:  Richard Gonzales MD  [address]  [facility] [address]  [facility] [address] [phone]    Schedule an appointment as soon as possible for a visit in 1 week  For Hospital Discharge Follow-up. Please bring a current list of medications as well as insurance information to this appointment. If you have any questions or concerns, please contact the office.    Washington Simmons MD  [address] [facility] [address]  [facility] [address]  [facility] [address] [phone]    In 3 weeks         DISCHARGE MEDICATION SUMMARY:     Summary of your Discharge Medications      Take these Medications      Details   apixaBAN 5 MG Tab  Commonly known as: Eliquis  Start taking on: October 16, 2020   Take 2  tablets by mouth every 12 hours for 7 days, THEN 1 tablet every 12 hours for 23 days.     atorvastatin 10 MG tablet  Commonly known as: LIPITOR   Take 1 tablet by mouth daily.  Comment: Do not fill until patient requests     lisinopril-hydroCHLOROthiazide 10-12.5 MG per tablet  Commonly known as: ZESTORETIC   Take 1 tablet by mouth daily.            Charting performed by wyatt Silverman and Frances Marroquin for Dr. Jessica Birmingham    All medical record entries made by the scribe were at my direction. I have reviewed the chart and agree that the record accurately reflects my personal performance of the history, physical exam, hospital course, and assessment and plan.               0

## 2020-12-15 NOTE — PHYSICAL THERAPY INITIAL EVALUATION ADULT - ADDITIONAL COMMENTS
Royce Prince. This patient received her first Gardasil with you and she was billed for it. She was told that it was covered for her. She is now trying to get reimbursed for it and needs a diagnosis and procedure code. Can you help her with this?   Pt. resides in house w/ family where pt. has no steps to ambulate in house. Pt. reports family assists pt. when ambulate steps. At baseline pt. ambulates w/ RW. Pt. speaks Mohawk. Used  [id number].

## 2021-01-06 ENCOUNTER — EMERGENCY (EMERGENCY)
Facility: HOSPITAL | Age: 76
LOS: 0 days | Discharge: HOME | End: 2021-01-06
Attending: EMERGENCY MEDICINE
Payer: MEDICARE

## 2021-01-06 VITALS
SYSTOLIC BLOOD PRESSURE: 158 MMHG | DIASTOLIC BLOOD PRESSURE: 74 MMHG | RESPIRATION RATE: 18 BRPM | OXYGEN SATURATION: 97 % | HEART RATE: 76 BPM | TEMPERATURE: 99 F

## 2021-01-06 VITALS — HEIGHT: 66 IN

## 2021-01-06 DIAGNOSIS — I50.9 HEART FAILURE, UNSPECIFIED: ICD-10-CM

## 2021-01-06 DIAGNOSIS — Z79.01 LONG TERM (CURRENT) USE OF ANTICOAGULANTS: ICD-10-CM

## 2021-01-06 DIAGNOSIS — Z90.711 ACQUIRED ABSENCE OF UTERUS WITH REMAINING CERVICAL STUMP: ICD-10-CM

## 2021-01-06 DIAGNOSIS — M54.5 LOW BACK PAIN: ICD-10-CM

## 2021-01-06 DIAGNOSIS — R07.89 OTHER CHEST PAIN: ICD-10-CM

## 2021-01-06 DIAGNOSIS — Z90.710 ACQUIRED ABSENCE OF BOTH CERVIX AND UTERUS: Chronic | ICD-10-CM

## 2021-01-06 DIAGNOSIS — Z86.718 PERSONAL HISTORY OF OTHER VENOUS THROMBOSIS AND EMBOLISM: ICD-10-CM

## 2021-01-06 DIAGNOSIS — G40.909 EPILEPSY, UNSPECIFIED, NOT INTRACTABLE, WITHOUT STATUS EPILEPTICUS: ICD-10-CM

## 2021-01-06 DIAGNOSIS — Z86.73 PERSONAL HISTORY OF TRANSIENT ISCHEMIC ATTACK (TIA), AND CEREBRAL INFARCTION WITHOUT RESIDUAL DEFICITS: ICD-10-CM

## 2021-01-06 DIAGNOSIS — Z79.899 OTHER LONG TERM (CURRENT) DRUG THERAPY: ICD-10-CM

## 2021-01-06 DIAGNOSIS — R06.02 SHORTNESS OF BREATH: ICD-10-CM

## 2021-01-06 DIAGNOSIS — U07.1 COVID-19: ICD-10-CM

## 2021-01-06 DIAGNOSIS — I11.0 HYPERTENSIVE HEART DISEASE WITH HEART FAILURE: ICD-10-CM

## 2021-01-06 DIAGNOSIS — M41.9 SCOLIOSIS, UNSPECIFIED: ICD-10-CM

## 2021-01-06 DIAGNOSIS — E78.5 HYPERLIPIDEMIA, UNSPECIFIED: ICD-10-CM

## 2021-01-06 LAB
ALBUMIN SERPL ELPH-MCNC: 2.9 G/DL — LOW (ref 3.3–5)
ALP SERPL-CCNC: 66 U/L — SIGNIFICANT CHANGE UP (ref 40–120)
ALT FLD-CCNC: 24 U/L — SIGNIFICANT CHANGE UP (ref 12–78)
ANION GAP SERPL CALC-SCNC: 6 MMOL/L — SIGNIFICANT CHANGE UP (ref 5–17)
APTT BLD: 29.3 SEC — SIGNIFICANT CHANGE UP (ref 27.5–35.5)
AST SERPL-CCNC: 40 U/L — HIGH (ref 15–37)
BASOPHILS # BLD AUTO: 0.01 K/UL — SIGNIFICANT CHANGE UP (ref 0–0.2)
BASOPHILS NFR BLD AUTO: 0.1 % — SIGNIFICANT CHANGE UP (ref 0–2)
BILIRUB SERPL-MCNC: 0.9 MG/DL — SIGNIFICANT CHANGE UP (ref 0.2–1.2)
BUN SERPL-MCNC: 21 MG/DL — SIGNIFICANT CHANGE UP (ref 7–23)
CALCIUM SERPL-MCNC: 9 MG/DL — SIGNIFICANT CHANGE UP (ref 8.5–10.1)
CHLORIDE SERPL-SCNC: 106 MMOL/L — SIGNIFICANT CHANGE UP (ref 96–108)
CO2 SERPL-SCNC: 25 MMOL/L — SIGNIFICANT CHANGE UP (ref 22–31)
CREAT SERPL-MCNC: 0.87 MG/DL — SIGNIFICANT CHANGE UP (ref 0.5–1.3)
EOSINOPHIL # BLD AUTO: 0.01 K/UL — SIGNIFICANT CHANGE UP (ref 0–0.5)
EOSINOPHIL NFR BLD AUTO: 0.1 % — SIGNIFICANT CHANGE UP (ref 0–6)
GLUCOSE SERPL-MCNC: 77 MG/DL — SIGNIFICANT CHANGE UP (ref 70–99)
HCT VFR BLD CALC: 38.3 % — SIGNIFICANT CHANGE UP (ref 34.5–45)
HGB BLD-MCNC: 11.4 G/DL — LOW (ref 11.5–15.5)
IMM GRANULOCYTES NFR BLD AUTO: 0.3 % — SIGNIFICANT CHANGE UP (ref 0–1.5)
INR BLD: 1.26 RATIO — HIGH (ref 0.88–1.16)
LYMPHOCYTES # BLD AUTO: 3.29 K/UL — SIGNIFICANT CHANGE UP (ref 1–3.3)
LYMPHOCYTES # BLD AUTO: 33.7 % — SIGNIFICANT CHANGE UP (ref 13–44)
MAGNESIUM SERPL-MCNC: 1.6 MG/DL — SIGNIFICANT CHANGE UP (ref 1.6–2.6)
MCHC RBC-ENTMCNC: 19 PG — LOW (ref 27–34)
MCHC RBC-ENTMCNC: 29.8 GM/DL — LOW (ref 32–36)
MCV RBC AUTO: 63.7 FL — LOW (ref 80–100)
MONOCYTES # BLD AUTO: 0.64 K/UL — SIGNIFICANT CHANGE UP (ref 0–0.9)
MONOCYTES NFR BLD AUTO: 6.6 % — SIGNIFICANT CHANGE UP (ref 2–14)
NEUTROPHILS # BLD AUTO: 5.79 K/UL — SIGNIFICANT CHANGE UP (ref 1.8–7.4)
NEUTROPHILS NFR BLD AUTO: 59.2 % — SIGNIFICANT CHANGE UP (ref 43–77)
NT-PROBNP SERPL-SCNC: 695 PG/ML — HIGH (ref 0–450)
PLATELET # BLD AUTO: 210 K/UL — SIGNIFICANT CHANGE UP (ref 150–400)
POTASSIUM SERPL-MCNC: 4.3 MMOL/L — SIGNIFICANT CHANGE UP (ref 3.5–5.3)
POTASSIUM SERPL-SCNC: 4.3 MMOL/L — SIGNIFICANT CHANGE UP (ref 3.5–5.3)
PROT SERPL-MCNC: 6.4 GM/DL — SIGNIFICANT CHANGE UP (ref 6–8.3)
PROTHROM AB SERPL-ACNC: 14.6 SEC — HIGH (ref 10.6–13.6)
RBC # BLD: 6.01 M/UL — HIGH (ref 3.8–5.2)
RBC # FLD: 17.3 % — HIGH (ref 10.3–14.5)
SARS-COV-2 RNA SPEC QL NAA+PROBE: DETECTED
SODIUM SERPL-SCNC: 137 MMOL/L — SIGNIFICANT CHANGE UP (ref 135–145)
TROPONIN I SERPL-MCNC: 0.04 NG/ML — SIGNIFICANT CHANGE UP (ref 0.01–0.04)
WBC # BLD: 9.77 K/UL — SIGNIFICANT CHANGE UP (ref 3.8–10.5)
WBC # FLD AUTO: 9.77 K/UL — SIGNIFICANT CHANGE UP (ref 3.8–10.5)

## 2021-01-06 PROCEDURE — 83735 ASSAY OF MAGNESIUM: CPT

## 2021-01-06 PROCEDURE — 83880 ASSAY OF NATRIURETIC PEPTIDE: CPT

## 2021-01-06 PROCEDURE — 36415 COLL VENOUS BLD VENIPUNCTURE: CPT

## 2021-01-06 PROCEDURE — 85025 COMPLETE CBC W/AUTO DIFF WBC: CPT

## 2021-01-06 PROCEDURE — 85730 THROMBOPLASTIN TIME PARTIAL: CPT

## 2021-01-06 PROCEDURE — 80053 COMPREHEN METABOLIC PANEL: CPT

## 2021-01-06 PROCEDURE — 99284 EMERGENCY DEPT VISIT MOD MDM: CPT | Mod: 25

## 2021-01-06 PROCEDURE — U0005: CPT

## 2021-01-06 PROCEDURE — 84484 ASSAY OF TROPONIN QUANT: CPT | Mod: 59

## 2021-01-06 PROCEDURE — 93010 ELECTROCARDIOGRAM REPORT: CPT

## 2021-01-06 PROCEDURE — 96375 TX/PRO/DX INJ NEW DRUG ADDON: CPT

## 2021-01-06 PROCEDURE — 96374 THER/PROPH/DIAG INJ IV PUSH: CPT

## 2021-01-06 PROCEDURE — 71045 X-RAY EXAM CHEST 1 VIEW: CPT | Mod: 26

## 2021-01-06 PROCEDURE — U0003: CPT

## 2021-01-06 PROCEDURE — 85610 PROTHROMBIN TIME: CPT

## 2021-01-06 PROCEDURE — 71045 X-RAY EXAM CHEST 1 VIEW: CPT

## 2021-01-06 PROCEDURE — 99285 EMERGENCY DEPT VISIT HI MDM: CPT

## 2021-01-06 PROCEDURE — 93005 ELECTROCARDIOGRAM TRACING: CPT

## 2021-01-06 NOTE — ED PROVIDER NOTE - PATIENT PORTAL LINK FT
You can access the FollowMyHealth Patient Portal offered by [facility] by registering at the following website: [email or web address]. By joining [employer]’s FollowMyHealth portal, you will also be able to view your health information using other applications (apps) compatible with our system.

## 2021-01-06 NOTE — ED ADULT NURSE REASSESSMENT NOTE - NS ED NURSE REASSESS COMMENT FT1
spoke with Dr. Asher, pts son in law, regarding pt condition. Dr. asher aware and agreeable to plan of care. pt in no acute distress, will ctm

## 2021-01-06 NOTE — ED PROVIDER NOTE - CLINICAL SUMMARY MEDICAL DECISION MAKING FREE TEXT BOX
pt with possible covid, saturating  99% on RA, also chest pressure. Will check troponin, ekg, monitor on telemetry, rule out acute coronary syndrome. likely dc w/ pcp f/u.

## 2021-01-06 NOTE — ED ADULT NURSE NOTE - OBJECTIVE STATEMENT
pt presents to ed with difficulty breathing/SOB. Upon exam, pt appears tachypneic. Satting 93%, placed on 3L NC with o2 improvement to 95%. Pt on the monitor, will ctm.

## 2021-01-06 NOTE — ED ADULT TRIAGE NOTE - CHIEF COMPLAINT QUOTE
c/o difficulty breathing for past 2 days, has positive covid exposure with family, was on doxycycline and prednisone started last week, O2 sat in triage 94% on RA, pulse 66, patient speaks Thai, pt's son Bel Lyle (son) [phone]

## 2021-01-06 NOTE — ED ADULT NURSE NOTE - CHIEF COMPLAINT QUOTE
c/o difficulty breathing for past 2 days, has positive covid exposure with family, was on doxycycline and prednisone started last week, O2 sat in triage 94% on RA, pulse 66, patient speaks Khmer, pt's son Bel Lyle (son) [phone]

## 2021-01-06 NOTE — ED ADULT NURSE NOTE - CHPI ED NUR SYMPTOMS NEG
no body aches/no chest pain/no chills/no cough/no diaphoresis/no edema/no fever/no headache/no hemoptysis/no wheezing

## 2021-01-06 NOTE — ED PROVIDER NOTE - NS ED ROS FT
Gen: No fever, normal appetite  Eyes: No eye irritation or discharge  ENT: No ear pain, congestion, sore throat  Resp: shortness of breath  Cardiovascular: chest pressure   Gastroenteric: No nausea/vomiting, diarrhea, constipation  :  No change in urine output; no dysuria  MS: No joint or muscle pain  Skin: No rashes  Neuro: No headache; no abnormal movements  Remainder negative, except as per the HPI

## 2021-01-06 NOTE — ED PROVIDER NOTE - OBJECTIVE STATEMENT
77 y/o female with a PMHx of Asthmatic bronchitis, CHF, CVA (2/2018) with resolved R sided weakness, DVT, HTN, HLD, presents to the ED c/o difficulty breathing x 2 days. Pt with + COVID exposure from family. Mild chest discomfort. no known fevers or chills. tolerating PO.

## 2021-01-06 NOTE — ED STATDOCS - PROGRESS NOTE DETAILS
Sunita ARTEAGA for Dr. Oseguera: 77 y/o female with a PMHx of Asthmatic bronchitis, CHF, CVA (2/2018) with resolved R sided weakness, DVT, HTN, HLD, presents to the ED c/o difficulty breathing x 2 days. Pt with + COVID exposure from family. EKG with PVCs and PACs, Will send pt to main ED for further evaluation.

## 2021-01-06 NOTE — ED PROVIDER NOTE - PHYSICAL EXAMINATION

## 2021-04-08 NOTE — ED STATDOCS - NS ED SCRIBE STATEMENT
Dr Mayra Gann aware of glucose  Provided 2 ounces apple juice to patient per her verbal order 
Attending

## 2021-05-28 NOTE — PATIENT PROFILE ADULT. - VISION (WITH CORRECTIVE LENSES IF THE PATIENT USUALLY WEARS THEM):
Dr. Mariah Bang advised.
For Friday
Gema from [address] [place] [address] [phone] is calling to inform Dr Tiffanie Narvaez that the PT was wanting to be referred to an ENT Dr within [facility] because, of her having  dizziness and sinus pressure for about several months. PT wanted more info about a specialist that could help with her problem. Dustin Cano gave her a list of ENT's close to her area.
Normal vision: sees adequately in most situations; can see medication labels, newsprint

## 2021-07-26 NOTE — PATIENT PROFILE ADULT. - MEDICATION PATCH, PROFILE
[FreeTextEntry1] : \par \par \par EXAM: DUPLEX SCAN EXT VEINS LOWER BI\par \par \par PROCEDURE DATE: 04/20/2021\par \par \par \par \par INTERPRETATION: CLINICAL INFORMATION: Bilateral lower extremity swelling and pain, rule out DVT.\par \par COMPARISON: A prior examination, dated 1/3/2021, showed no DVT.\par \par TECHNIQUE: Duplex sonography of the BILATERAL LOWER extremity veins with color and spectral Doppler, with and without compression.\par \par FINDINGS: There is edema within the superficial soft tissues of the right and left lower extremities.\par \par RIGHT:\par Normal compressibility of the RIGHT common femoral, femoral and popliteal veins.\par Doppler examination shows normal spontaneous and phasic flow.\par No RIGHT calf vein thrombosis is detected.\par \par LEFT:\par Normal compressibility of the LEFT common femoral, femoral and popliteal veins.\par Doppler examination shows normal spontaneous and phasic flow.\par No LEFT calf vein thrombosis is detected.\par \par IMPRESSION:\par No evidence of deep venous thrombosis in either lower extremity.\par \par  none

## 2022-04-25 NOTE — ED PROVIDER NOTE - MEDICAL DECISION MAKING DETAILS
100 SOB and cough, likely URI/viral bronchitis, but hx of DVT, will check CTA chest to r/o PE or pneumonia as patient is failing outpatient management started yesterday.

## 2022-08-26 NOTE — ED PROVIDER NOTE - NS_ ATTENDINGSCRIBEDETAILS _ED_A_ED_FT
Orders/Forms signed and faxed.     
Verified Medication Changes; received via fax. Orders/Forms in your mailbox to be signed.    
The scribe's documentation has been prepared under my direction and personally reviewed by me in its entirety. I confirm that the note above accurately reflects all work, treatment, procedures, and medical decision-making performed by me.  Bryson Proctor MD

## 2022-09-16 NOTE — SWALLOW BEDSIDE ASSESSMENT ADULT - SLP PRECAUTIONS/LIMITATIONS: HEARING
contacted patient  off [facility] therapy waitlist to verify needs of services in attempts to update waitlist lvm for patient to contact intake dept 
within functional limits

## 2023-06-05 NOTE — ED PROVIDER NOTE - CROS ED NEURO ALL NEG
I have personally provided the amount of critical care time documented below concurrently with the resident/fellow.  This time excludes time spent on separate procedures and time spent teaching. I have reviewed the resident’s / fellow’s documentation and I agree with the history, exam, and assessment and plan of care.
- - -

## 2023-06-22 NOTE — ED ADULT NURSE REASSESSMENT NOTE - NS ED NURSE REASSESS COMMENT FT1
report received from ELLE Carr. pt resting comfortably in bed, BP WNL. awaiting sons return for p/u as pt and son requesting to be discharge before 8 PM if possible for observation of Ramadan. c/o right flank pain/pain, flank

## 2023-06-26 ENCOUNTER — INPATIENT (INPATIENT)
Facility: HOSPITAL | Age: 78
LOS: 2 days | Discharge: HOME HEALTH | DRG: 291 | End: 2023-06-29
Attending: FAMILY MEDICINE | Admitting: FAMILY MEDICINE
Payer: MEDICARE

## 2023-06-26 VITALS — WEIGHT: 160.06 LBS | HEIGHT: 60 IN

## 2023-06-26 DIAGNOSIS — R07.9 CHEST PAIN, UNSPECIFIED: ICD-10-CM

## 2023-06-26 DIAGNOSIS — Z90.710 ACQUIRED ABSENCE OF BOTH CERVIX AND UTERUS: Chronic | ICD-10-CM

## 2023-06-26 LAB
ADD ON TEST-SPECIMEN IN LAB: SIGNIFICANT CHANGE UP
ALBUMIN SERPL ELPH-MCNC: 3.1 G/DL — LOW (ref 3.3–5)
ALP SERPL-CCNC: 69 U/L — SIGNIFICANT CHANGE UP (ref 40–120)
ALT FLD-CCNC: 26 U/L — SIGNIFICANT CHANGE UP (ref 12–78)
ANION GAP SERPL CALC-SCNC: 2 MMOL/L — LOW (ref 5–17)
APTT BLD: 28.8 SEC — SIGNIFICANT CHANGE UP (ref 27.5–35.5)
AST SERPL-CCNC: 19 U/L — SIGNIFICANT CHANGE UP (ref 15–37)
BASOPHILS # BLD AUTO: 0.02 K/UL — SIGNIFICANT CHANGE UP (ref 0–0.2)
BASOPHILS NFR BLD AUTO: 0.3 % — SIGNIFICANT CHANGE UP (ref 0–2)
BILIRUB SERPL-MCNC: 0.7 MG/DL — SIGNIFICANT CHANGE UP (ref 0.2–1.2)
BUN SERPL-MCNC: 15 MG/DL — SIGNIFICANT CHANGE UP (ref 7–23)
CALCIUM SERPL-MCNC: 8.8 MG/DL — SIGNIFICANT CHANGE UP (ref 8.5–10.1)
CHLORIDE SERPL-SCNC: 107 MMOL/L — SIGNIFICANT CHANGE UP (ref 96–108)
CO2 SERPL-SCNC: 31 MMOL/L — SIGNIFICANT CHANGE UP (ref 22–31)
CREAT SERPL-MCNC: 0.85 MG/DL — SIGNIFICANT CHANGE UP (ref 0.5–1.3)
D DIMER BLD IA.RAPID-MCNC: <150 NG/ML DDU — SIGNIFICANT CHANGE UP
EGFR: 71 ML/MIN/1.73M2 — SIGNIFICANT CHANGE UP
EOSINOPHIL # BLD AUTO: 0.02 K/UL — SIGNIFICANT CHANGE UP (ref 0–0.5)
EOSINOPHIL NFR BLD AUTO: 0.3 % — SIGNIFICANT CHANGE UP (ref 0–6)
GLUCOSE SERPL-MCNC: 139 MG/DL — HIGH (ref 70–99)
HCT VFR BLD CALC: 41.4 % — SIGNIFICANT CHANGE UP (ref 34.5–45)
HGB BLD-MCNC: 12.2 G/DL — SIGNIFICANT CHANGE UP (ref 11.5–15.5)
IMM GRANULOCYTES NFR BLD AUTO: 0.7 % — SIGNIFICANT CHANGE UP (ref 0–0.9)
INR BLD: 1.05 RATIO — SIGNIFICANT CHANGE UP (ref 0.88–1.16)
LYMPHOCYTES # BLD AUTO: 2.24 K/UL — SIGNIFICANT CHANGE UP (ref 1–3.3)
LYMPHOCYTES # BLD AUTO: 29.6 % — SIGNIFICANT CHANGE UP (ref 13–44)
MCHC RBC-ENTMCNC: 19.8 PG — LOW (ref 27–34)
MCHC RBC-ENTMCNC: 29.5 GM/DL — LOW (ref 32–36)
MCV RBC AUTO: 67.3 FL — LOW (ref 80–100)
MONOCYTES # BLD AUTO: 0.33 K/UL — SIGNIFICANT CHANGE UP (ref 0–0.9)
MONOCYTES NFR BLD AUTO: 4.4 % — SIGNIFICANT CHANGE UP (ref 2–14)
NEUTROPHILS # BLD AUTO: 4.92 K/UL — SIGNIFICANT CHANGE UP (ref 1.8–7.4)
NEUTROPHILS NFR BLD AUTO: 64.7 % — SIGNIFICANT CHANGE UP (ref 43–77)
NT-PROBNP SERPL-SCNC: 941 PG/ML — HIGH (ref 0–450)
PLATELET # BLD AUTO: 223 K/UL — SIGNIFICANT CHANGE UP (ref 150–400)
POTASSIUM SERPL-MCNC: 4.1 MMOL/L — SIGNIFICANT CHANGE UP (ref 3.5–5.3)
POTASSIUM SERPL-SCNC: 4.1 MMOL/L — SIGNIFICANT CHANGE UP (ref 3.5–5.3)
PROT SERPL-MCNC: 6.3 GM/DL — SIGNIFICANT CHANGE UP (ref 6–8.3)
PROTHROM AB SERPL-ACNC: 12.2 SEC — SIGNIFICANT CHANGE UP (ref 10.5–13.4)
RBC # BLD: 6.15 M/UL — HIGH (ref 3.8–5.2)
RBC # FLD: 17 % — HIGH (ref 10.3–14.5)
SODIUM SERPL-SCNC: 140 MMOL/L — SIGNIFICANT CHANGE UP (ref 135–145)
TROPONIN I, HIGH SENSITIVITY RESULT: 30.5 NG/L — SIGNIFICANT CHANGE UP
WBC # BLD: 7.58 K/UL — SIGNIFICANT CHANGE UP (ref 3.8–10.5)
WBC # FLD AUTO: 7.58 K/UL — SIGNIFICANT CHANGE UP (ref 3.8–10.5)

## 2023-06-26 PROCEDURE — 93010 ELECTROCARDIOGRAM REPORT: CPT

## 2023-06-26 PROCEDURE — 80053 COMPREHEN METABOLIC PANEL: CPT

## 2023-06-26 PROCEDURE — 83036 HEMOGLOBIN GLYCOSYLATED A1C: CPT

## 2023-06-26 PROCEDURE — 99285 EMERGENCY DEPT VISIT HI MDM: CPT

## 2023-06-26 PROCEDURE — 85730 THROMBOPLASTIN TIME PARTIAL: CPT

## 2023-06-26 PROCEDURE — 71045 X-RAY EXAM CHEST 1 VIEW: CPT | Mod: 26

## 2023-06-26 PROCEDURE — A9500: CPT

## 2023-06-26 PROCEDURE — 80048 BASIC METABOLIC PNL TOTAL CA: CPT

## 2023-06-26 PROCEDURE — G0378: CPT

## 2023-06-26 PROCEDURE — 93017 CV STRESS TEST TRACING ONLY: CPT

## 2023-06-26 PROCEDURE — 84484 ASSAY OF TROPONIN QUANT: CPT

## 2023-06-26 PROCEDURE — G1004: CPT

## 2023-06-26 PROCEDURE — 85025 COMPLETE CBC W/AUTO DIFF WBC: CPT

## 2023-06-26 PROCEDURE — 93005 ELECTROCARDIOGRAM TRACING: CPT

## 2023-06-26 PROCEDURE — 80061 LIPID PANEL: CPT

## 2023-06-26 PROCEDURE — 78452 HT MUSCLE IMAGE SPECT MULT: CPT

## 2023-06-26 PROCEDURE — 85610 PROTHROMBIN TIME: CPT

## 2023-06-26 PROCEDURE — 86803 HEPATITIS C AB TEST: CPT

## 2023-06-26 PROCEDURE — 36415 COLL VENOUS BLD VENIPUNCTURE: CPT

## 2023-06-26 PROCEDURE — 93306 TTE W/DOPPLER COMPLETE: CPT

## 2023-06-26 PROCEDURE — 70450 CT HEAD/BRAIN W/O DYE: CPT | Mod: 26,MG

## 2023-06-26 RX ORDER — ASPIRIN/CALCIUM CARB/MAGNESIUM 324 MG
325 TABLET ORAL ONCE
Refills: 0 | Status: COMPLETED | OUTPATIENT
Start: 2023-06-26 | End: 2023-06-26

## 2023-06-26 RX ORDER — ACETAMINOPHEN 500 MG
1 TABLET ORAL
Qty: 0 | Refills: 0 | DISCHARGE

## 2023-06-26 RX ORDER — FUROSEMIDE 40 MG
40 TABLET ORAL ONCE
Refills: 0 | Status: COMPLETED | OUTPATIENT
Start: 2023-06-26 | End: 2023-06-26

## 2023-06-26 RX ADMIN — Medication 325 MILLIGRAM(S): at 18:01

## 2023-06-26 RX ADMIN — Medication 40 MILLIGRAM(S): at 20:11

## 2023-06-26 NOTE — ED PROVIDER NOTE - MUSCULOSKELETAL, MLM
Spine appears normal, range of motion is not limited, no muscle or joint tenderness. 1+ pitting edema b/l LE

## 2023-06-26 NOTE — ED STATDOCS - PROGRESS NOTE DETAILS
Sunita Nunes for Dr. Wadsworth :  77 y/o female w/ PMHx of DVT on Eliquis, CVA, CHF, Behcet's syndrome, asthmatic bronchitis, hysterectomy, HTN, HLD, non intractable epilepsy, h/o hysterectomy presents to the ED c/o non-radiating left sided chest pain, weakness, fatigue and SOB that began today. Pt states a month and a half ago she had similar pain, had an appointment with Dr. Lira, but missed her appointment. Pt also endorses h/o of CVA and CAD w/ stents. Will send pt to main ED for further evaluation.

## 2023-06-26 NOTE — PATIENT PROFILE ADULT - FALL HARM RISK - HARM RISK INTERVENTIONS
Assistance with ambulation/Assistance OOB with selected safe patient handling equipment/Communicate Risk of Fall with Harm to all staff/Discuss with provider need for PT consult/Monitor for mental status changes/Monitor gait and stability/Provide patient with walking aids - walker, cane, crutches/Reinforce activity limits and safety measures with patient and family/Reorient to person, place and time as needed/Tailored Fall Risk Interventions/Toileting schedule using arm’s reach rule for commode and bathroom/Visual Cue: Yellow wristband and red socks/Bed in lowest position, wheels locked, appropriate side rails in place/Call bell, personal items and telephone in reach/Instruct patient to call for assistance before getting out of bed or chair/Non-slip footwear when patient is out of bed/[place] to call system/Physically safe environment - no spills, clutter or unnecessary equipment/Purposeful Proactive Rounding/Room/bathroom lighting operational, light cord in reach

## 2023-06-26 NOTE — ED ADULT TRIAGE NOTE - CHIEF COMPLAINT QUOTE
PT presents to er with complaints of non-radiating left sided chest pain with SOB, states she is on Elaquis for LE DVT history.

## 2023-06-26 NOTE — ED PROVIDER NOTE - EKG ADDITIONAL QUESTION - PERFORMED INDEPENDENT VISUALIZATION
Pulmonary Progress Note      NAME: [address]: [id number] - MRN: [id number] - Age: 80year old - : 1946    Assessment/Plan:  1. Pulmonary Perioperative Risk Assessment:  please see Dr. Barker Read note from   2.  RLL pneumonia: improving on cooperative, no respiratory distress. HEENT: Normocephalic atraumatic. Lips, mucosa, and tongue normal.  No thrush noted. Lungs: right basilar crackles   Chest wall: No tenderness or deformity. Heart: Regular rate and rhythm, normal S1S2, no murmur. Yes

## 2023-06-26 NOTE — ED PROVIDER NOTE - CLINICAL SUMMARY MEDICAL DECISION MAKING FREE TEXT BOX
EKG nonischemic.  No events on tele.  XR ? mild congestion, left pleural effusion.  Troponin WNL. Dimer WNL.  BNP elevated.  Likely CHF exacerbation, need to r/o ACS with CP however now CP free.  Getting ASA, Lasix.  Admit to medicine tele service.

## 2023-06-26 NOTE — PATIENT PROFILE ADULT - LEGAL HELP
February 18, 2019      Colten Lucas  [address] [place] [address]        Dear Colten,     Thank you for choosing [facility]. You are scheduled for the following service:     Date:  Friday, March 8, 2019            Procedure:  COLONOSCOPY  Doctor:        Dr. Yung Jones   Arrival Time:  1030am *Check in at Emergency/Endoscopy desk*  Procedure Time:  1100am     Location:   [facility]        Endoscopy Department, First Floor (Enter through ER Doors) *        [address] [place], [place] [address]      [phone] or [phone] ([facility]) to reschedule      MIRALAX -GATORADE  PREP  Colonoscopy is the most accurate test to detect colon polyps and colon cancer; and the only test where polyps can be removed. During this procedure, a doctor examines the lining of your large intestine and rectum through a flexible tube.   Transportation  Arrange for a ride for the day of your procedure with a responsible adult.  A taxi ride is not an option unless you are accompanied by a responsible adult. If you fail to arrange transportation with a responsible adult, your procedure will be cancelled and rescheduled.    Purchase the  following supplies at your local pharmacy:  - 2 (two) bisacodyl tablets: each tablet contains 5 mg.  (Dulcolax  laxative NOT Dulcolax  stool softener)   - 1 (one) 8.3 oz bottle of Polyethylene Glycol (PEG) 3350 Powder   (MiraLAX , Smooth LAX , ClearLAX  or equivalent)  - 64 oz Gatorade    Regular Gatorade, Gatorade G2 , Powerade , Powerade Zero  or Pedialyte  is acceptable. Red colored flavors are not allowed; all other colors (yellow, green, orange, purple and blue) are okay. It is also okay to buy two 2.12 oz packets of powdered Gatorade that can be mixed with water to a total volume of 64 oz of liquid.  - 1 (one) 10 oz bottle of Magnesium Citrate (Red colored flavors are not allowed)  It is also okay for you to use a  0.5 oz package of powdered magnesium citrate (17 g) mixed with 10 oz of water.      PREPARATION FOR COLONOSCOPY    7 days before:    Discontinue fiber supplements and medications containing iron. This includes Metamucil  and Fibercon ; and multivitamins with iron.    3 days before:    Begin a low-fiber diet. A low-fiber diet helps making the cleanout more effective.     Examples of a low-fiber diet include (but are not limited to): white bread, white rice, pasta, crackers, fish, chicken, eggs, ground beef, creamy peanut butter, cooked/steamed/boiled vegetables, canned fruit, bananas, melons, milk, plain yogurt cheese, salad dressing and other condiments.     The following are not allowed on a low-fiber diet: seeds, nuts, popcorn, bran, whole wheat, corn, quinoa, raw fruits and vegetables, berries and dried fruit, beans and lentils.    For additional details on low-fiber diet, please refer to the table on the last page.    2 days before:    Continue the low-fiber diet.     Drink at least 8 glasses of water throughout the day.     Stop eating solid foods at 11:45 pm.    1 day before:    In the morning: begin a clear liquid diet (liquids you can see through).     Examples of a clear liquid diet include: water, clear broth or bouillon, Gatorade, Pedialyte or Powerade, carbonated and non-carbonated soft drinks (Sprite , 7-Up , ginger ale), strained fruit juices without pulp (apple, white grape, white cranberry), Jell-O  and popsicles.     The following are not allowed on a clear liquid diet: red liquids, alcoholic beverages, dairy products (milk, creamer, and yogurt), protein shakes, creamy broths, juice with pulp and chewing tobacco.    At noon: take 2 (two) bisacodyl tablets     At 4 (and no later than 6pm): start drinking the Miralax-Gatorade preparation (8.3 oz of Miralax mixed with 64 oz of Gatorade in a large pitcher). Drink 1(one) 8 oz glass every 15 minutes thereafter, until the mixture is gone.    COLON  CLEANSING TIPS: drink adequate amounts of fluids before and after your colon cleansing to prevent dehydration. Stay near a toilet because you will have diarrhea. Even if you are sitting on the toilet, continue to drink the cleansing solution every 15 minutes. If you feel nauseous or vomit, rinse your mouth with water, take a 15 to 30-minute-break and then continue drinking the solution. You will be uncomfortable until the stool has flushed from your colon (in about 2 to 4 hours). You may feel chilled.    Day of your procedure  You may take all of your morning medications including blood pressure medications, blood thinners (if you have not been instructed to stop these by our office), methadone, anti-seizure medications with sips of water 3 hours prior to your procedure or earlier. Do not take insulin or vitamins prior to your procedure. Continue the clear liquid diet.       4 hours prior: drink 10 oz of magnesium citrate. It may be easier to drink it with a straw.    STOP consuming all liquids after that.     Do not take anything by mouth during this time.     Allow extra time to travel to your procedure as you may need to stop and use a restroom along the way.    You are ready for the procedure, if you followed all instructions and your stool is no longer formed, but clear or yellow liquid. If you are unsure whether your colon is clean, please call our office at [phone] before you leave for your appointment.    Bring the following to your procedure:  - Insurance Card/Photo ID.   - List of current medications including over-the-counter medications and supplements.   - Your rescue inhaler if you currently use one to control asthma.      Canceling or rescheduling your appointment:   If you must cancel or reschedule your appointment, please call [phone] as soon as possible.      COLONOSCOPY PRE-PROCEDURE CHECKLIST    If you have diabetes, ask your regular doctor for diet and medication restrictions.  If you  take an anticoagulant or anti-platelet medication (such as Coumadin , Lovenox , Pradaxa , Xarelto , Eliquis , etc.), please call your primary doctor for advice on holding this medication.  If you take aspirin you may continue to do so.  If you are or may be pregnant, please discuss the risks and benefits of this procedure with your doctor.        What happens during a colonoscopy?    Plan to spend up to two hours, starting at registration time, at the endoscopy center the day of your procedure. The colonoscopy takes an average of 15 to 30 minutes. Recovery time is about 30 minutes.      Before the exam:    You will change into a gown.    Your medical history and medication list will be reviewed with you, unless that has been done over the phone prior to the procedure.     A nurse will insert an intravenous (IV) line into your hand or arm.    The doctor will meet with you and will give you a consent form to sign.  During the exam:     Medicine will be given through the IV line to help you relax.     Your heart rate and oxygen levels will be monitored. If your blood pressure is low, you may be given fluids through the IV line.     The doctor will insert a flexible hollow tube, called a colonoscope, into your rectum. The scope will be advanced slowly through the large intestine (colon).    You may have a feeling of fullness or pressure.     If an abnormal tissue or a polyp is found, the doctor may remove it through the endoscope for closer examination, or biopsy. Tissue removal is painless    After the exam:           Any tissue samples removed during the exam will be sent to a lab for evaluation. It may take 5-7 working days for you to be notified of the results.     A nurse will provide you with complete discharge instructions before you leave the endoscopy center. Be sure to ask the nurse for specific instructions if you take blood thinners such as Aspirin, Coumadin or Plavix.     The doctor will prepare a full  report for you and for the physician who referred you for the procedure.     Your doctor will talk with you about the initial results of your exam.      Medication given during the exam will prohibit you from driving for the rest of the day.     Following the exam, you may resume your normal diet. Your first meal should be light, no greasy foods. Avoid alcohol until the next day.     You may resume your regular activities the day after the procedure.         LOW-FIBER DIET    Foods RECOMMENDED Foods to AVOID   Breads, Cereal, Rice and Pasta:   White bread, rolls, biscuits, croissant and michelle toast.   Waffles, Lithuanian toast and pancakes.   White rice, noodles, pasta, macaroni and peeled cooked potatoes.   Plain crackers and saltines.   Cooked cereals: farina, cream of rice.   Cold cereals: Puffed Rice , Rice Krispies , Corn Flakes  and Special K    Breads, Cereal, Rice and Pasta:   Breads or rolls with nuts, seeds or fruit.   Whole wheat, pumpernickel, rye breads and cornbread.   Potatoes with skin, brown or wild rice, and kasha (buckwheat).     Vegetables:   Tender cooked and canned vegetables without seeds: carrots, asparagus tips, green or wax beans, pumpkin, spinach, lima beans. Vegetables:   Raw or steamed vegetables.   Vegetables with seeds.   Sauerkraut.   Winter squash, peas, broccoli, Brussel sprouts, cabbage, onions, cauliflower, baked beans, peas and corn.   Fruits:   Strained fruit juice.   Canned fruit, except pineapple.   Ripe bananas and melon. Fruits:   Prunes and prune juice.   Raw fruits.   Dried fruits: figs, dates and raisins.   Milk/Dairy:   Milk: plain or flavored.   Yogurt, custard and ice cream.   Cheese and cottage cheese Milk/Dairy:     Meat and other proteins:   ground, well-cooked tender beef, lamb, ham, veal, pork, fish, poultry and organ meats.   Eggs.   Peanut butter without nuts. Meat and other proteins:   Tough, fibrous meats with gristle.   Dry beans, peas and lentils.   Peanut  butter with nuts.   Tofu.   Fats, Snack, Sweets, Condiments and Beverages:   Margarine, butter, oils, mayonnaise, sour cream and salad dressing, plain gravy.   Sugar, hard candy, clear jelly, honey and syrup.   Spices, cooked herbs, bouillon, broth and soups made with allowed vegetable, ketchup and mustard.   Coffee, tea and carbonated drinks.   Plain cakes, cookies and pretzels.   Gelatin, plain puddings, custard, ice cream, sherbet and popsicles. Fats, Snack, Sweets, Condiments and Beverages:   Nuts, seeds and coconut.   Jam, marmalade and preserves.   Pickles, olives, relish and horseradish.   All desserts containing nuts, seeds, dried fruit and coconut; or made from whole grains or bran.   Candy made with nuts or seeds.   Popcorn.                     DIRECTIONS TO THE ENDOSCOPY DEPARTMENT     From the north ([facility])  Take [address] South, exit on [address] [address]. Get into the left hand obey, turn left (east), go one-half mile to [address] and turn left. Go north to the first stoplight, take a right on [address] and follow it to the Emergency entrance.    From the south ([facility])  Take 35N to the 35E split and exit on [address] [address]. On [address] [address], turn left (west) to [address]. Turn right (north) on [address]. Go north to the first stoplight, take a right on [address] and follow it to the Emergency entrance.    From the east via 35E ([facility])  Take [address] south to [address] [address] exit. Turn right on [address] . Go west to [address]. Turn right (north) on [address]. Go to the first stoplight, take a right and follow on [address] to the Emergency entrance.    From the east via [address] ([facility])  Take [address] to [address]. Turn left (south) on [address] to [address]. Turn left (east) on [address] and follow it to the Emergency entrance.    From the west via [address] ([place])  Take [address] east to [address]. Turn right (south) on [address] to [address]. Turn left (east) on [address] and follow it to the Emergency entrance.        Sincerely,        [facility]       no

## 2023-06-26 NOTE — ED ADULT NURSE NOTE - NSFALLUNIVINTERV_ED_ALL_ED
Bed/Stretcher in lowest position, wheels locked, appropriate side rails in place/Call bell, personal items and telephone in reach/Instruct patient to call for assistance before getting out of bed/chair/stretcher/Non-slip footwear applied when patient is off stretcher/[place] to call system/Physically safe environment - no spills, clutter or unnecessary equipment/Purposeful proactive rounding/Room/bathroom lighting operational, light cord in reach

## 2023-06-26 NOTE — PHARMACOTHERAPY INTERVENTION NOTE - COMMENTS
Medication reconciliation completed.  Patient was unable to provide medication information, spoke to Bel at bedside and they provided current medication list; confirmed with Dr. First Med[facility]x.

## 2023-06-26 NOTE — ED PROVIDER NOTE - CARE PLAN
1 Principal Discharge DX:	Chest pain  Secondary Diagnosis:	Acute systolic congestive heart failure

## 2023-06-26 NOTE — ED ADULT NURSE NOTE - CAS EDN DISCHARGE INTERVENTIONS
[facility]  [address] [facility], [address] [place] [address]  [phone]      Medical Progress Note      NAME: Zuleika Hendrickson   :  1931  MRM:  [id number]    Date/Time: 2019         Subjective:     Chief Complaint:  Patient was seen and examined by me. Chart reviewed. Still c/o lower back pain. Could not tolerate back brace       Objective:       Vitals:       Last 24hrs VS reviewed since prior progress note.  Most recent are:    Visit Vitals  /69   Pulse 80   Temp 98.6 °F (37 °C)   Resp 15   Ht 5' 2\" (1.575 m)   Wt 68.5 kg (151 lb)   SpO2 98%   BMI 27.62 kg/m²     SpO2 Readings from Last 6 Encounters:   19 98%   19 95%   19 94%   19 95%   19 94%   19 98%            Intake/Output Summary (Last 24 hours) at 2019 1035  Last data filed at 2019 0033  Gross per 24 hour   Intake 600 ml   Output 200 ml   Net 400 ml        Exam:     Physical Exam:    Gen:  Elderly, frail, mild distress  HEENT:  Pink conjunctivae, PERRL, hearing intact to voice, moist mucous membranes  Neck:  Supple, without masses, thyroid non-tender  Resp:  No accessory muscle use, clear breath sounds without wheezes rales or rhonchi  Card:  No murmurs, normal S1, S2 without thrills, bruits or peripheral edema  Abd:  Soft, non-tender, non-distended, normoactive bowel sounds are present  Musc:  No cyanosis or clubbing, lower back pain to palpation, ROM  Skin:  No rashes   Neuro:  Cranial nerves 3-12 are grossly intact, follows commands appropriately  Psych:  Good insight, oriented to person, place and time, alert    Medications Reviewed: (see below)    Lab Data Reviewed: (see below)    ______________________________________________________________________    Medications:     Current Facility-Administered Medications   Medication Dose Route Frequency    0.9% sodium chloride with KCl 20 mEq/L infusion   IntraVENous CONTINUOUS    polyethylene glycol (MIRALAX) packet 17 g 17 g Oral BID    bisacodyl (DULCOLAX) suppository 10 mg  10 mg Rectal DAILY PRN    famotidine (PEPCID) tablet 20 mg  20 mg Oral DAILY    sodium chloride (NS) flush 5-40 mL  5-40 mL IntraVENous Q8H    sodium chloride (NS) flush 5-40 mL  5-40 mL IntraVENous PRN    acetaminophen (TYLENOL) tablet 650 mg  650 mg Oral Q6H PRN    naloxone (NARCAN) injection 0.4 mg  0.4 mg IntraVENous PRN    traMADol (ULTRAM) tablet 50 mg  50 mg Oral Q6H PRN    oxyCODONE IR (ROXICODONE) tablet 5 mg  5 mg Oral Q4H PRN    HYDROmorphone (DILAUDID) syringe 0.2 mg  0.2 mg IntraVENous Q4H PRN    lidocaine 4 % patch 1 Patch  1 Patch TransDERmal Q24H    senna-docusate (PERICOLACE) 8.6-50 mg per tablet 1 Tab  1 Tab Oral BID    carvedilol (COREG) tablet 3.125 mg  3.125 mg Oral BID WITH MEALS    cholecalciferol (VITAMIN D3) tablet 2,000 Units  2,000 Units Oral DAILY    levothyroxine (SYNTHROID) tablet 88 mcg  88 mcg Oral ACB    therapeutic multivitamin (THERAGRAN) tablet 1 Tab  1 Tab Oral DAILY    predniSONE (DELTASONE) tablet 10 mg  10 mg Oral EVERY OTHER DAY    simvastatin (ZOCOR) tablet 20 mg  20 mg Oral QHS    sertraline (ZOLOFT) tablet 50 mg  50 mg Oral DAILY    budesonide (PULMICORT) 500 mcg/2 ml nebulizer suspension  500 mcg Nebulization BID RT    arformoterol (BROVANA) neb solution 15 mcg  15 mcg Nebulization BID RT    albuterol-ipratropium (DUO-NEB) 2.5 MG-0.5 MG/3 ML  3 mL Nebulization Q6H PRN    predniSONE (DELTASONE) tablet 5 mg  5 mg Oral EVERY OTHER DAY          Lab Review:     Recent Labs     07/01/19  0512 06/30/19  0435 06/29/19  0912   WBC 8.2 8.5 8.2   HGB 14.0 14.4 14.9   HCT 41.7 43.7 44.7   * 119* 141*     Recent Labs     07/01/19  0512 06/30/19  0435 06/29/19  0912   * 137 137   K 3.9 4.0 4.0    101 99   CO2 29 30 36*   * 132* 126*   BUN 25* 24* 24*   CREA 1.13* 1.07* 1.05*   CA 8.7 8.8 9.2   MG 1.8 1.7  --    PHOS 3.3 2.8  --    ALB  --  3.3* 3.3*   TBILI  --  0.9 1.1*   SGOT  -- 18 16   ALT  --  16 19   INR  --  1.3*  --      No results found for: GLUCPOC       Assessment / Plan:     Principal Problem:    80 yo hx of HTN, afib s/p pacer, ICM EF 30-35%, COPD, CKD 3, colon CA s/p partial colectomy, presented w/ back pain, L3 compression fx, debility    1) L3 compression fracture/acute back pain: unable to tolerate back brace. Will need kyphoplasty. Will defer to Ortho. Cont PT/OT    2) Hematuria: now resolved. Likely from traumatic valentine (valentine out). Urology recommended outpatient f/u (w/ Dr. Hyacinth Albarran)    3) Afib: s/p pacer. Holding Eliquis for possible kypho    4) COPD: stable. Cont LABA/ICS    5) HTN: cont coreg    6) CKD 3: stable.   Will monitor     Total time spent with patient: 25 min                  Care Plan discussed with: Patient, family, nursing, ortho    Discussed:  Care Plan    Prophylaxis:  SCD's    Disposition:  [facility]/Rehab           ___________________________________________________    Attending Physician: Dean Pimentel MD No complains of pain/discomfort IV intact

## 2023-06-26 NOTE — ED PROVIDER NOTE - OBJECTIVE STATEMENT
79 y/o female with a PMHx of asthmatic bronchitis, Behcet's syndrome, CCAD s/p stent, CHF, CVA, DVT on Eliquis, HLD, HTN, lumbago, nonintractable epilepsy, scoliosis presents to the ED c/o left sided CP, SOB and dizziness starting this morning. On Lasix 40mg. No other complaints at this time. Cardio: Dr. Lira.

## 2023-06-26 NOTE — ED STATDOCS - NSICDXPASTMEDICALHX_GEN_ALL_CORE_FT
PAST MEDICAL HISTORY:  Asthmatic bronchitis     Behcet's syndrome     CHF (congestive heart failure)     CVA (cerebral vascular accident)     DVT (deep venous thrombosis)     HLD (hyperlipidemia)     HTN (hypertension)     Lumbago     Nonintractable epilepsy without status epilepticus 2/23/2018    Scoliosis

## 2023-06-27 LAB
A1C WITH ESTIMATED AVERAGE GLUCOSE RESULT: 6.4 % — HIGH (ref 4–5.6)
ALBUMIN SERPL ELPH-MCNC: 2.6 G/DL — LOW (ref 3.3–5)
ALP SERPL-CCNC: 56 U/L — SIGNIFICANT CHANGE UP (ref 40–120)
ALT FLD-CCNC: 20 U/L — SIGNIFICANT CHANGE UP (ref 12–78)
ANION GAP SERPL CALC-SCNC: 5 MMOL/L — SIGNIFICANT CHANGE UP (ref 5–17)
APTT BLD: 27.3 SEC — LOW (ref 27.5–35.5)
AST SERPL-CCNC: 18 U/L — SIGNIFICANT CHANGE UP (ref 15–37)
BASOPHILS # BLD AUTO: 0.03 K/UL — SIGNIFICANT CHANGE UP (ref 0–0.2)
BASOPHILS NFR BLD AUTO: 0.4 % — SIGNIFICANT CHANGE UP (ref 0–2)
BILIRUB SERPL-MCNC: 0.6 MG/DL — SIGNIFICANT CHANGE UP (ref 0.2–1.2)
BUN SERPL-MCNC: 18 MG/DL — SIGNIFICANT CHANGE UP (ref 7–23)
CALCIUM SERPL-MCNC: 8.5 MG/DL — SIGNIFICANT CHANGE UP (ref 8.5–10.1)
CHLORIDE SERPL-SCNC: 110 MMOL/L — HIGH (ref 96–108)
CHOLEST SERPL-MCNC: 117 MG/DL — SIGNIFICANT CHANGE UP
CO2 SERPL-SCNC: 28 MMOL/L — SIGNIFICANT CHANGE UP (ref 22–31)
CREAT SERPL-MCNC: 0.76 MG/DL — SIGNIFICANT CHANGE UP (ref 0.5–1.3)
EGFR: 81 ML/MIN/1.73M2 — SIGNIFICANT CHANGE UP
EOSINOPHIL # BLD AUTO: 0.09 K/UL — SIGNIFICANT CHANGE UP (ref 0–0.5)
EOSINOPHIL NFR BLD AUTO: 1.1 % — SIGNIFICANT CHANGE UP (ref 0–6)
ESTIMATED AVERAGE GLUCOSE: 137 MG/DL — HIGH (ref 68–114)
GLUCOSE SERPL-MCNC: 109 MG/DL — HIGH (ref 70–99)
HCT VFR BLD CALC: 38.5 % — SIGNIFICANT CHANGE UP (ref 34.5–45)
HCV AB S/CO SERPL IA: 0.06 S/CO — SIGNIFICANT CHANGE UP (ref 0–0.99)
HCV AB SERPL-IMP: SIGNIFICANT CHANGE UP
HDLC SERPL-MCNC: 52 MG/DL — SIGNIFICANT CHANGE UP
HGB BLD-MCNC: 11.4 G/DL — LOW (ref 11.5–15.5)
IMM GRANULOCYTES NFR BLD AUTO: 0.5 % — SIGNIFICANT CHANGE UP (ref 0–0.9)
INR BLD: 1.05 RATIO — SIGNIFICANT CHANGE UP (ref 0.88–1.16)
LIPID PNL WITH DIRECT LDL SERPL: 49 MG/DL — SIGNIFICANT CHANGE UP
LYMPHOCYTES # BLD AUTO: 3.89 K/UL — HIGH (ref 1–3.3)
LYMPHOCYTES # BLD AUTO: 47 % — HIGH (ref 13–44)
MCHC RBC-ENTMCNC: 19.8 PG — LOW (ref 27–34)
MCHC RBC-ENTMCNC: 29.6 GM/DL — LOW (ref 32–36)
MCV RBC AUTO: 66.8 FL — LOW (ref 80–100)
MONOCYTES # BLD AUTO: 0.54 K/UL — SIGNIFICANT CHANGE UP (ref 0–0.9)
MONOCYTES NFR BLD AUTO: 6.5 % — SIGNIFICANT CHANGE UP (ref 2–14)
NEUTROPHILS # BLD AUTO: 3.68 K/UL — SIGNIFICANT CHANGE UP (ref 1.8–7.4)
NEUTROPHILS NFR BLD AUTO: 44.5 % — SIGNIFICANT CHANGE UP (ref 43–77)
NON HDL CHOLESTEROL: 65 MG/DL — SIGNIFICANT CHANGE UP
PLATELET # BLD AUTO: 187 K/UL — SIGNIFICANT CHANGE UP (ref 150–400)
POTASSIUM SERPL-MCNC: 3.2 MMOL/L — LOW (ref 3.5–5.3)
POTASSIUM SERPL-SCNC: 3.2 MMOL/L — LOW (ref 3.5–5.3)
PROT SERPL-MCNC: 5.4 GM/DL — LOW (ref 6–8.3)
PROTHROM AB SERPL-ACNC: 12.2 SEC — SIGNIFICANT CHANGE UP (ref 10.5–13.4)
RBC # BLD: 5.76 M/UL — HIGH (ref 3.8–5.2)
RBC # FLD: 16.1 % — HIGH (ref 10.3–14.5)
SODIUM SERPL-SCNC: 143 MMOL/L — SIGNIFICANT CHANGE UP (ref 135–145)
TRIGL SERPL-MCNC: 81 MG/DL — SIGNIFICANT CHANGE UP
TROPONIN I, HIGH SENSITIVITY RESULT: 40.53 NG/L — SIGNIFICANT CHANGE UP
TROPONIN I, HIGH SENSITIVITY RESULT: 45.43 NG/L — SIGNIFICANT CHANGE UP
WBC # BLD: 8.27 K/UL — SIGNIFICANT CHANGE UP (ref 3.8–10.5)
WBC # FLD AUTO: 8.27 K/UL — SIGNIFICANT CHANGE UP (ref 3.8–10.5)

## 2023-06-27 PROCEDURE — 99222 1ST HOSP IP/OBS MODERATE 55: CPT

## 2023-06-27 PROCEDURE — 93010 ELECTROCARDIOGRAM REPORT: CPT

## 2023-06-27 RX ORDER — POTASSIUM CHLORIDE 20 MEQ
40 PACKET (EA) ORAL EVERY 4 HOURS
Refills: 0 | Status: COMPLETED | OUTPATIENT
Start: 2023-06-27 | End: 2023-06-27

## 2023-06-27 RX ORDER — REGADENOSON 0.08 MG/ML
0.4 INJECTION, SOLUTION INTRAVENOUS ONCE
Refills: 0 | Status: COMPLETED | OUTPATIENT
Start: 2023-06-27 | End: 2023-06-28

## 2023-06-27 RX ORDER — LOSARTAN POTASSIUM 100 MG/1
100 TABLET, FILM COATED ORAL DAILY
Refills: 0 | Status: DISCONTINUED | OUTPATIENT
Start: 2023-06-27 | End: 2023-06-29

## 2023-06-27 RX ORDER — ATORVASTATIN CALCIUM 80 MG/1
40 TABLET, FILM COATED ORAL AT BEDTIME
Refills: 0 | Status: DISCONTINUED | OUTPATIENT
Start: 2023-06-27 | End: 2023-06-29

## 2023-06-27 RX ORDER — PANTOPRAZOLE SODIUM 20 MG/1
40 TABLET, DELAYED RELEASE ORAL
Refills: 0 | Status: DISCONTINUED | OUTPATIENT
Start: 2023-06-27 | End: 2023-06-29

## 2023-06-27 RX ORDER — METOPROLOL TARTRATE 50 MG
12.5 TABLET ORAL
Refills: 0 | Status: DISCONTINUED | OUTPATIENT
Start: 2023-06-27 | End: 2023-06-29

## 2023-06-27 RX ORDER — FUROSEMIDE 40 MG
40 TABLET ORAL DAILY
Refills: 0 | Status: DISCONTINUED | OUTPATIENT
Start: 2023-06-27 | End: 2023-06-29

## 2023-06-27 RX ORDER — APIXABAN 2.5 MG/1
5 TABLET, FILM COATED ORAL EVERY 12 HOURS
Refills: 0 | Status: DISCONTINUED | OUTPATIENT
Start: 2023-06-27 | End: 2023-06-29

## 2023-06-27 RX ORDER — APIXABAN 2.5 MG/1
2.5 TABLET, FILM COATED ORAL EVERY 12 HOURS
Refills: 0 | Status: DISCONTINUED | OUTPATIENT
Start: 2023-06-27 | End: 2023-06-27

## 2023-06-27 RX ADMIN — APIXABAN 5 MILLIGRAM(S): 2.5 TABLET, FILM COATED ORAL at 21:24

## 2023-06-27 RX ADMIN — APIXABAN 2.5 MILLIGRAM(S): 2.5 TABLET, FILM COATED ORAL at 10:39

## 2023-06-27 RX ADMIN — Medication 12.5 MILLIGRAM(S): at 10:39

## 2023-06-27 RX ADMIN — Medication 40 MILLIGRAM(S): at 10:39

## 2023-06-27 RX ADMIN — LOSARTAN POTASSIUM 100 MILLIGRAM(S): 100 TABLET, FILM COATED ORAL at 10:39

## 2023-06-27 RX ADMIN — Medication 40 MILLIEQUIVALENT(S): at 18:47

## 2023-06-27 RX ADMIN — Medication 10 MILLIGRAM(S): at 10:39

## 2023-06-27 RX ADMIN — Medication 40 MILLIEQUIVALENT(S): at 21:36

## 2023-06-27 RX ADMIN — ATORVASTATIN CALCIUM 40 MILLIGRAM(S): 80 TABLET, FILM COATED ORAL at 21:24

## 2023-06-27 RX ADMIN — Medication 12.5 MILLIGRAM(S): at 21:24

## 2023-06-27 NOTE — DIETITIAN INITIAL EVALUATION ADULT - OTHER INFO
79 y/o F w/ PMH of behcet's syndorome, asthmatic bronchtitis, CAD s/p PCI, chronic diastolic CHF, CVA, DVT on Eliquis, HTN, dyslipidemia, lumbago, epilepsy, scoliosis, p/w CP. States she had CP for a long time, since before she had stents. However, she had more intense, sharp pain yesterday morning, in substernal area.  Admit for CP w/ h/o CAD s/p PCI, dyspnea 2/2 acute on chronic diastoic CHF    Obtain strict I&Os, daily weights. On lasix IV - Monitor lytes/ min and replete prn (potassium). Reports improved appetite since admit; RD observed break fast tray and consumed ~50%. Unable to confirm UBW; however, reports weight has increased since taking steroids - on prednisone. Bed scale wt of 203# taken by RD on 6/27/23. Unable to identify any pertinent wt changes at this time. NFPE reveals no muscle/fat wasting at this time; edema could be masking losses, skewing appearance (+3 L/R ankle edema doc'd on 6/26/23) - appears overweight. Liberalize diet to regular to maximize caloric and nutrient intake. Add premier protein shake BID (provides 160kcal, 30g protein) - pt is receptive. See below for other recommendations.

## 2023-06-27 NOTE — H&P ADULT - ASSESSMENT
79 y/o F w/ PMH of behcet's syndorome, asthmatic bronchtiis, CAD s/p PCI, chronic diastolic CHF, CVA, DVT on Eliquis, HTN, dyslipidemia, lumbago, epilepsy, scoliosis, p/w CP    *CP w/ h/o CAD s/p PCI  -Troponin negative x 1  -D-dimer WNL   -S/p ASA   -F/u cardio  -EKG:   -Tele monitoring  -Echo     *Dyspnea 2/2 acute on chronic diastoic CHF  -Lasix IV  -I/Os + Daily weights  -F/u cardio  -Echo  -On BB + ARB     *H/o behcet's syndrome / asthmatic bronchitis  / DVA / DVT / HTN / dyslipidemia / epilepsy  -C/w home meds and f/u outpatient for further management if conditions remain stable during hospitalization    *DVT ppx  -On Eliquis    77 y/o F w/ PMH of behcet's syndorome, asthmatic bronchtiis, CAD s/p PCI, chronic diastolic CHF, CVA, DVT on Eliquis, HTN, dyslipidemia, lumbago, epilepsy, scoliosis, p/w CP    *CP w/ h/o CAD s/p PCI  -Troponin negative x 1  -D-dimer WNL   -S/p ASA   -F/u cardio  -EKG:   -Tele monitoring  -Echo     *Dyspnea 2/2 acute on chronic diastoic CHF  -Lasix IV  -I/Os + Daily weights  -F/u cardio  -Echo  -On BB + ARB     *H/o behcet's syndrome / asthmatic bronchitis  / DVA / DVT / HTN / dyslipidemia / epilepsy  -C/w home meds and f/u outpatient for further management if conditions remain stable during hospitalization    *DVT ppx  -On Eliquis      79 y/o F w/ PMH of behcet's syndorome, asthmatic bronchtiis, CAD s/p PCI, chronic diastolic CHF, CVA, DVT on Eliquis, HTN, dyslipidemia, lumbago, epilepsy, scoliosis, p/w CP    *CP w/ h/o CAD s/p PCI  -Troponin negative x 1  -D-dimer WNL   -S/p ASA   -F/u cardio  -EKG   -Tele monitoring  -Echo     *Dyspnea 2/2 acute on chronic diastoic CHF  -Lasix IV  -I/Os + Daily weights  -F/u cardio  -Echo  -On BB + ARB     *H/o behcet's syndrome / asthmatic bronchitis  / DVA / DVT / HTN / dyslipidemia / epilepsy  -C/w home meds and f/u outpatient for further management if conditions remain stable during hospitalization    *DVT ppx  -On Eliquis

## 2023-06-27 NOTE — DIETITIAN INITIAL EVALUATION ADULT - ORAL INTAKE PTA/DIET HISTORY
Is Penelope speaking; utilized  #[id number]. Lives at home w/ son and daughter-in-law, daughter-in-law typically cooks/grocery shops. Reports decreased appetite, consumes 2 meals/day, likely consuming <75% of ENN x 1-2 mo 2/2 pain

## 2023-06-27 NOTE — DIETITIAN INITIAL EVALUATION ADULT - PERTINENT MEDS FT
MEDICATIONS  (STANDING):  apixaban 2.5 milliGRAM(s) Oral every 12 hours  atorvastatin 40 milliGRAM(s) Oral at bedtime  furosemide   Injectable 40 milliGRAM(s) IV Push daily  losartan 100 milliGRAM(s) Oral daily  metoprolol tartrate 12.5 milliGRAM(s) Oral two times a day  predniSONE   Tablet 10 milliGRAM(s) Oral daily  regadenoson Injectable 0.4 milliGRAM(s) IV Push once    MEDICATIONS  (PRN):  pantoprazole    Tablet 40 milliGRAM(s) Oral before breakfast PRN for heartburn

## 2023-06-27 NOTE — DIETITIAN INITIAL EVALUATION ADULT - ADD RECOMMEND
1) Liberalize diet to regular to maximize caloric and nutrient intake.  2) Add premier protein shake BID (provides 160kcal, 30g protein)  3) Monitor bowel movements, if no BM for >3 days, consider implementing bowel regimen.  4) Encourage protein-rich foods, maximize food preferences  5) MVI w/ minerals daily to ensure 100% RDA met   6) Meal encouragement; tray set-up to optimize po intake  7) Monitor lytes/ min and replete prn  8) Obtain daily wts, strict I&Os   RD will continue to monitor PO intake, labs, hydration, and wt prn.

## 2023-06-27 NOTE — H&P ADULT - HISTORY OF PRESENT ILLNESS
77 y/o F w/ PMH of behcet's syndorome, asthmatic bronchtiis, CAD s/p PCI, chronic diastolic CHF, CVA, DVT on Eliquis, HTN, dyslipidemia, lumbago, epilepsy, scoliosis, p/w CP. States she had CP for a long time, since before she had stents. However, she had more intense, sharp pain yesterday morning, in substernal area. Denies radiation to arm / jaw / back. States CP has resolved at this time. Patient also had heavy breathing. +B/L LE edema.      PSH: hysterectomy     Social Hx: No smoking / etoh / drugs     Family Hx: No known pertinent family hx

## 2023-06-27 NOTE — DIETITIAN INITIAL EVALUATION ADULT - PERTINENT LABORATORY DATA
06-27    143  |  110<H>  |  18  ----------------------------<  109<H>  3.2<L>   |  28  |  0.76    Ca    8.5      27 Jun 2023 07:38    TPro  5.4<L>  /  Alb  2.6<L>  /  TBili  0.6  /  DBili  x   /  AST  18  /  ALT  20  /  AlkPhos  56  06-27  A1C with Estimated Average Glucose Result: 6.4 % (06-27-23 @ 07:38)

## 2023-06-27 NOTE — DIETITIAN INITIAL EVALUATION ADULT - NS FNS DIET ORDER
Diet, NPO after Midnight:      NPO Start Date: 27-Jun-2023,   NPO Start Time: 23:59 (06-27-23 @ 10:22)  Diet, Regular:   DASH/TLC {Sodium & Cholesterol Restricted} (DASH) (06-27-23 @ 01:02)

## 2023-06-28 LAB
ANION GAP SERPL CALC-SCNC: 5 MMOL/L — SIGNIFICANT CHANGE UP (ref 5–17)
BUN SERPL-MCNC: 29 MG/DL — HIGH (ref 7–23)
CALCIUM SERPL-MCNC: 9.3 MG/DL — SIGNIFICANT CHANGE UP (ref 8.5–10.1)
CHLORIDE SERPL-SCNC: 110 MMOL/L — HIGH (ref 96–108)
CO2 SERPL-SCNC: 27 MMOL/L — SIGNIFICANT CHANGE UP (ref 22–31)
CREAT SERPL-MCNC: 0.75 MG/DL — SIGNIFICANT CHANGE UP (ref 0.5–1.3)
EGFR: 82 ML/MIN/1.73M2 — SIGNIFICANT CHANGE UP
GLUCOSE SERPL-MCNC: 107 MG/DL — HIGH (ref 70–99)
POTASSIUM SERPL-MCNC: 3.4 MMOL/L — LOW (ref 3.5–5.3)
POTASSIUM SERPL-SCNC: 3.4 MMOL/L — LOW (ref 3.5–5.3)
SODIUM SERPL-SCNC: 142 MMOL/L — SIGNIFICANT CHANGE UP (ref 135–145)

## 2023-06-28 PROCEDURE — 93016 CV STRESS TEST SUPVJ ONLY: CPT

## 2023-06-28 PROCEDURE — 99232 SBSQ HOSP IP/OBS MODERATE 35: CPT

## 2023-06-28 PROCEDURE — 93010 ELECTROCARDIOGRAM REPORT: CPT

## 2023-06-28 PROCEDURE — 78452 HT MUSCLE IMAGE SPECT MULT: CPT | Mod: 26

## 2023-06-28 PROCEDURE — 93018 CV STRESS TEST I&R ONLY: CPT

## 2023-06-28 RX ORDER — POTASSIUM CHLORIDE 20 MEQ
40 PACKET (EA) ORAL ONCE
Refills: 0 | Status: COMPLETED | OUTPATIENT
Start: 2023-06-28 | End: 2023-06-28

## 2023-06-28 RX ADMIN — APIXABAN 5 MILLIGRAM(S): 2.5 TABLET, FILM COATED ORAL at 13:52

## 2023-06-28 RX ADMIN — Medication 40 MILLIEQUIVALENT(S): at 21:29

## 2023-06-28 RX ADMIN — LOSARTAN POTASSIUM 100 MILLIGRAM(S): 100 TABLET, FILM COATED ORAL at 13:52

## 2023-06-28 RX ADMIN — ATORVASTATIN CALCIUM 40 MILLIGRAM(S): 80 TABLET, FILM COATED ORAL at 21:29

## 2023-06-28 RX ADMIN — REGADENOSON 0.4 MILLIGRAM(S): 0.08 INJECTION, SOLUTION INTRAVENOUS at 09:57

## 2023-06-28 RX ADMIN — Medication 12.5 MILLIGRAM(S): at 21:29

## 2023-06-28 RX ADMIN — Medication 40 MILLIGRAM(S): at 13:53

## 2023-06-28 RX ADMIN — Medication 12.5 MILLIGRAM(S): at 13:52

## 2023-06-28 RX ADMIN — APIXABAN 5 MILLIGRAM(S): 2.5 TABLET, FILM COATED ORAL at 21:29

## 2023-06-28 RX ADMIN — Medication 10 MILLIGRAM(S): at 13:52

## 2023-06-28 NOTE — CHART NOTE - NSCHARTNOTEFT_GEN_A_CORE
STRESS TEST REPORT    PHILIPPE ÁLVAREZ 78yF  MRN: [id number]  : 46  Admit: 23    REGADENASON    Regadenoson was 5 cc (0.4 mg Regadenoson) which was given rapidly over 10 seconds  into a peripheral IV.  Immediately after Regadenoson injection, flush w/ 5 cc saline given.  Radiopharmaceutical was injected 10-20 sec after the saline flush.  Patient was monitored for 6 minutes.  A 12 lead ECG was recorded every minute & BP was recorded throughout the test.      Resting ECG: rbbb  Peak infusion ECG: rbbb   Chest pain: none    CONCLUSION:  Normal response to IV lexiscan.  See myocardial perfusion scan report.

## 2023-06-28 NOTE — PROGRESS NOTE ADULT - ASSESSMENT
79 y/o F w/ PMH of behcet's syndorome, asthmatic bronchtiis, CAD s/p PCI, chronic diastolic CHF, CVA, DVT on Eliquis, HTN, dyslipidemia, lumbago, epilepsy, scoliosis, p/w CP    *CP w/ h/o CAD s/p PCI  -Troponin negative x 1  -D-dimer WNL   -S/p ASA   -F/u cardio  -EKG   -Tele monitoring  -Echo     *Dyspnea 2/2 acute on chronic diastoic CHF  -Lasix IV  -I/Os + Daily weights  -F/u cardio  -Echo  -On BB + ARB     *H/o behcet's syndrome / asthmatic bronchitis  / DVA / DVT / HTN / dyslipidemia / epilepsy  -C/w home meds and f/u outpatient for further management if conditions remain stable during hospitalization    *DVT ppx  -On Eliquis

## 2023-06-29 ENCOUNTER — TRANSCRIPTION ENCOUNTER (OUTPATIENT)
Age: 78
End: 2023-06-29

## 2023-06-29 VITALS
RESPIRATION RATE: 18 BRPM | HEART RATE: 74 BPM | DIASTOLIC BLOOD PRESSURE: 68 MMHG | TEMPERATURE: 98 F | SYSTOLIC BLOOD PRESSURE: 115 MMHG | OXYGEN SATURATION: 95 %

## 2023-06-29 LAB
ANION GAP SERPL CALC-SCNC: 2 MMOL/L — LOW (ref 5–17)
BUN SERPL-MCNC: 34 MG/DL — HIGH (ref 7–23)
CALCIUM SERPL-MCNC: 9.1 MG/DL — SIGNIFICANT CHANGE UP (ref 8.5–10.1)
CHLORIDE SERPL-SCNC: 114 MMOL/L — HIGH (ref 96–108)
CO2 SERPL-SCNC: 28 MMOL/L — SIGNIFICANT CHANGE UP (ref 22–31)
CREAT SERPL-MCNC: 0.75 MG/DL — SIGNIFICANT CHANGE UP (ref 0.5–1.3)
EGFR: 82 ML/MIN/1.73M2 — SIGNIFICANT CHANGE UP
GLUCOSE SERPL-MCNC: 114 MG/DL — HIGH (ref 70–99)
POTASSIUM SERPL-MCNC: 4.4 MMOL/L — SIGNIFICANT CHANGE UP (ref 3.5–5.3)
POTASSIUM SERPL-SCNC: 4.4 MMOL/L — SIGNIFICANT CHANGE UP (ref 3.5–5.3)
SODIUM SERPL-SCNC: 144 MMOL/L — SIGNIFICANT CHANGE UP (ref 135–145)

## 2023-06-29 PROCEDURE — 99239 HOSP IP/OBS DSCHRG MGMT >30: CPT

## 2023-06-29 RX ORDER — APIXABAN 2.5 MG/1
1 TABLET, FILM COATED ORAL
Qty: 60 | Refills: 0
Start: 2023-06-29 | End: 2023-07-28

## 2023-06-29 RX ORDER — APIXABAN 2.5 MG/1
0.5 TABLET, FILM COATED ORAL
Refills: 0 | DISCHARGE

## 2023-06-29 RX ADMIN — PANTOPRAZOLE SODIUM 40 MILLIGRAM(S): 20 TABLET, DELAYED RELEASE ORAL at 10:23

## 2023-06-29 RX ADMIN — LOSARTAN POTASSIUM 100 MILLIGRAM(S): 100 TABLET, FILM COATED ORAL at 10:23

## 2023-06-29 RX ADMIN — Medication 10 MILLIGRAM(S): at 10:23

## 2023-06-29 RX ADMIN — APIXABAN 5 MILLIGRAM(S): 2.5 TABLET, FILM COATED ORAL at 10:22

## 2023-06-29 RX ADMIN — Medication 40 MILLIGRAM(S): at 10:22

## 2023-06-29 RX ADMIN — Medication 12.5 MILLIGRAM(S): at 10:23

## 2023-06-29 NOTE — DISCHARGE NOTE PROVIDER - CARE PROVIDER_API CALL
Tami Manzano N  Cardiology  [address] [place] [address]  Phone: [phone]  Fax: [phone]  Follow Up Time:

## 2023-06-29 NOTE — DISCHARGE NOTE PROVIDER - HOSPITAL COURSE
77 y/o F w/ PMH of behcet's syndorome, asthmatic bronchtiis, CAD s/p PCI, chronic diastolic CHF, CVA, DVT on Eliquis, HTN, dyslipidemia, lumbago, epilepsy, scoliosis, p/w CP. States she had CP for a long time, since before she had stents. However, she had more intense, sharp pain yesterday morning, in substernal area. Denies radiation to arm / jaw / back. States CP has resolved at this time. Patient also had heavy breathing. +B/L LE edema.  All other review of systems negative, except as noted in HPI      77 y/o F w/ PMH of behcet's syndorome, asthmatic bronchtiis, CAD s/p PCI, chronic diastolic CHF, CVA, DVT on Eliquis, HTN, dyslipidemia, lumbago, epilepsy, scoliosis, p/w CP    *CP resolved w/ h/o CAD s/p PCI- ruled out ACS   -Troponin negative   No evidence of ACS.  SPECT stress test.  cardio  increased eliquis  to 5 mg twice daily.  Continue Lasix  Continue beta-blocker and losartan.  D-dimer WNL  unlikely PE  echo 60-65%  cleared by cardio for discharge     *Dyspnea resolved  2/2 acute on chronic diastoic CHF  -Lasix IV 40 switch to po  -I/Os + Daily weights  -F/u cardio  cleared for cardio for discharge  -On BB + ARB     *H/o behcet's syndrome / asthmatic bronchitis  / DVA / DVT / HTN / dyslipidemia / epilepsy  -C/w home meds and f/u outpatient    *DVT ppx  -On Eliquis     6/29 patient denies SOB, no CP, no dizziness eager to go home   physical exam  Constitutional: NAD  HEENT: Atraumatic, SAAD, Normal, No congestion  Respiratory: Breath Sounds normal, no rhonchi/wheeze  Cardiovascular: N S1S2;   Gastrointestinal: Abdomen soft, non tender, Bowel Ssounds present  Extremities:1+ edema B/ LE improved , peripheral pulses present  Neurological: AAO x 3, no gross focal motor deficits  Skin: Non cellulitic, venous stasis erythematous changes to B/L shins which is chronic for a few months , ulcers    discharge time 47mins

## 2023-06-29 NOTE — DISCHARGE NOTE NURSING/CASE MANAGEMENT/SOCIAL WORK - NSDCPEFALRISK_GEN_ALL_CORE
For information on Fall & Injury Prevention, visit: [email or web address] OR  [email or web address] OR  https://www.cdc.gov/steadi/patient.html

## 2023-06-29 NOTE — DISCHARGE NOTE PROVIDER - NSDCMRMEDTOKEN_GEN_ALL_CORE_FT
atorvastatin 40 mg oral tablet: 1 tab(s) orally once a day (at bedtime)  Eliquis 5 mg oral tablet: 1 tab(s) orally 2 times a day  furosemide 40 mg oral tablet: 1 tab(s) orally once a day (in the morning)  losartan 100 mg oral tablet: 1 tab(s) orally once a day (in the morning)  metoprolol tartrate 50 mg oral tablet: 0.25 tab(s) orally 2 times a day  pantoprazole 40 mg oral delayed release tablet: 1 tab(s) orally once a day as needed for  heartburn  predniSONE 10 mg oral tablet: 1 tab(s) orally once a day (in the morning)

## 2023-06-29 NOTE — PROGRESS NOTE ADULT - SUBJECTIVE AND OBJECTIVE BOX
77 y/o F w/ PMH of behcet's syndorome, asthmatic bronchtiis, CAD s/p PCI, chronic diastolic CHF, CVA, DVT on Eliquis, HTN, dyslipidemia, lumbago, epilepsy, scoliosis, p/w CP. States she had CP for a long time, since before she had stents. However, she had more intense, sharp pain yesterday morning, in substernal area. Denies radiation to arm / jaw / back. States CP has resolved at this time. Patient also had heavy breathing. +B/L LE edema.  All other review of systems negative, except as noted in HPI      PHYSICAL EXAM:    Daily     Daily Weight in k.4 (2023 05:10)    ICU Vital Signs Last 24 Hrs  T(C): 36.6 (2023 15:59), Max: 36.7 (2023 07:59)  T(F): 97.9 (2023 15:59), Max: 98.1 (2023 07:59)  HR: 85 (2023 15:59) (63 - 99)  BP: 130/61 (2023 15:59) (130/61 - 145/72)  BP(mean): --  ABP: --  ABP(mean): --  RR: 18 (2023 15:59) (18 - 18)  SpO2: 96% (2023 15:59) (93% - 97%)    O2 Parameters below as of 2023 15:59  Patient On (Oxygen Delivery Method): room air            Constitutional: NAD  HEENT: Atraumatic, SAAD, Normal, No congestion  Respiratory: Breath Sounds normal, no rhonchi/wheeze  Cardiovascular: N S1S2;   Gastrointestinal: Abdomen soft, non tender, Bowel Ssounds present  Extremities: No edema, peripheral pulses present  Neurological: AAO x 3, no gross focal motor deficits  Skin: Non cellulitic, no rash, ulcers                            11.4   8.27  )-----------( 187      ( 2023 07:38 )             38.5       CBC Full  -  ( 2023 07:38 )  WBC Count : 8.27 K/uL  RBC Count : 5.76 M/uL  Hemoglobin : 11.4 g/dL  Hematocrit : 38.5 %  Platelet Count - Automated : 187 K/uL  Mean Cell Volume : 66.8 fl  Mean Cell Hemoglobin : 19.8 pg  Mean Cell Hemoglobin Concentration : 29.6 gm/dL  Auto Neutrophil # : 3.68 K/uL  Auto Lymphocyte # : 3.89 K/uL  Auto Monocyte # : 0.54 K/uL  Auto Eosinophil # : 0.09 K/uL  Auto Basophil # : 0.03 K/uL  Auto Neutrophil % : 44.5 %  Auto Lymphocyte % : 47.0 %  Auto Monocyte % : 6.5 %  Auto Eosinophil % : 1.1 %  Auto Basophil % : 0.4 %          142  |  110<H>  |  29<H>  ----------------------------<  107<H>  3.4<L>   |  27  |  0.75    Ca    9.3      2023 13:20    TPro  5.4<L>  /  Alb  2.6<L>  /  TBili  0.6  /  DBili  x   /  AST  18  /  ALT  20  /  AlkPhos  56        LIVER FUNCTIONS - ( 2023 07:38 )  Alb: 2.6 g/dL / Pro: 5.4 gm/dL / ALK PHOS: 56 U/L / ALT: 20 U/L / AST: 18 U/L / GGT: x             PT/INR - ( 2023 07:38 )   PT: 12.2 sec;   INR: 1.05 ratio         PTT - ( 2023 07:38 )  PTT:27.3 sec          Urinalysis Basic - ( 2023 13:20 )    Color: x / Appearance: x / SG: x / pH: x  Gluc: 107 mg/dL / Ketone: x  / Bili: x / Urobili: x   Blood: x / Protein: x / Nitrite: x   Leuk Esterase: x / RBC: x / WBC x   Sq Epi: x / Non Sq Epi: x / Bacteria: x            MEDICATIONS  (STANDING):  apixaban 5 milliGRAM(s) Oral every 12 hours  atorvastatin 40 milliGRAM(s) Oral at bedtime  furosemide   Injectable 40 milliGRAM(s) IV Push daily  losartan 100 milliGRAM(s) Oral daily  metoprolol tartrate 12.5 milliGRAM(s) Oral two times a day  potassium chloride    Tablet ER 40 milliEquivalent(s) Oral once  predniSONE   Tablet 10 milliGRAM(s) Oral daily      
Review of Systems:  General:denies fever chills, headache, weakness  HEENT: denies blurry vision,diffculty swallowing, difficulty hearing, tinnitus  Cardiovascular: denies chest pain  ,palpitations  Pulmonary:denies shortness of breath, cough, wheezing, hemoptysis  Gastrointestinal: denies abdominal pain, constipation, diarrhea,nausea , vomiting, hematochezia  : denies hematuria, dysuria, or incontinence  Neurological: denies weakness, numbness , tingling, dizziness, tremors  MSK: denies muscle pain, difficulty ambulating, swelling, back pain  skin: denies skin rash, itching, burning, or  skin lesions  Psychiatrical: denies mood disturbances, anxierty, feeling depressed, depression , or difficulty sleeping    Objective:  Vitals  T(C): 36.7 (06-26-23 @ 21:22), Max: 36.8 (06-26-23 @ 14:22)  HR: 100 (06-26-23 @ 21:22) (57 - 100)  BP: 134/68 (06-26-23 @ 21:22) (114/71 - 156/59)  RR: 18 (06-26-23 @ 21:22) (17 - 18)  SpO2: 91% (06-26-23 @ 21:22) (91% - 95%)    Physical Exam:  General: comfortable, no acute distress  HEENT: Atraumatic, no LAD, trachea midline, PERRLA  Cardiovascular: normal s1s2, no murmurs, gallops or fricition rubs  Pulmonary: clear to ausculation Bilaterally, no wheezing , rhonchi  Gastrointestinal: soft non tender non distended, no masses felt, no organomegally  Muscloskeletal: no lower extremity edema, intact bilateral lower extremity pulses  Neurological: CN II-12 intact. No focal weakness  Psychiatrical: normal mood, cooperative  SKIN: no rash, lesions or ulcers    Labs:                          11.4   8.27  )-----------( 187      ( 27 Jun 2023 07:38 )             38.5     06-27    143  |  110<H>  |  18  ----------------------------<  109<H>  3.2<L>   |  28  |  0.76    Ca    8.5      27 Jun 2023 07:38    TPro  5.4<L>  /  Alb  2.6<L>  /  TBili  0.6  /  DBili  x   /  AST  18  /  ALT  20  /  AlkPhos  56  06-27    LIVER FUNCTIONS - ( 27 Jun 2023 07:38 )  Alb: 2.6 g/dL / Pro: 5.4 gm/dL / ALK PHOS: 56 U/L / ALT: 20 U/L / AST: 18 U/L / GGT: x           PT/INR - ( 27 Jun 2023 07:38 )   PT: 12.2 sec;   INR: 1.05 ratio         PTT - ( 27 Jun 2023 07:38 )  PTT:27.3 sec      Active Medications  MEDICATIONS  (STANDING):  apixaban 2.5 milliGRAM(s) Oral every 12 hours  atorvastatin 40 milliGRAM(s) Oral at bedtime  furosemide   Injectable 40 milliGRAM(s) IV Push daily  losartan 100 milliGRAM(s) Oral daily  metoprolol tartrate 12.5 milliGRAM(s) Oral two times a day  predniSONE   Tablet 10 milliGRAM(s) Oral daily    MEDICATIONS  (PRN):  pantoprazole    Tablet 40 milliGRAM(s) Oral before breakfast PRN for heartburn    
The patient was seen and examined.  Edema improved.  Stress that showed no ischemia.  No further chest discomfort.    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    She presents for evaluation of chest discomfort, described as squeezing in nature.  Cardiac enzymes negative.  EKG shows new right bundle branch block.  BNP minimally elevated.    PREVIOUS CARDIAC WORKUP:    Echocardiogram  6/10/2022 interventricular septum is mildly hypertrophied, normal EF with trace MR, mild TR/mild LA    Cardiac Catheterization  July 16, 2018-left main normal, LAD large-caliber vessel.  Minor irregularities.  Proximal LCx 90% stenosis, RCA and ramus with large caliber vessel with minor irregularities.  Patient is status post PCI to proximal LCx  ________________________________  Review of systems: A 10 point review of system has been performed, and is negative except for what has been mentioned in the above history of present illness.     PAST MEDICAL & SURGICAL HISTORY:  HTN (hypertension)      DVT (deep venous thrombosis)      CVA (cerebral vascular accident)      CHF (congestive heart failure)      HLD (hyperlipidemia)      Nonintractable epilepsy without status epilepticus  2/23/2018      Asthmatic bronchitis      Behcet's syndrome      Lumbago      Scoliosis      S/P hysterectomy    ALLERGIES:  penicillin (Rash)    Home Medications:  atorvastatin 40 mg oral tablet: 1 tab(s) orally once a day (at bedtime) (26 Jun 2023 18:40)  Eliquis 5 mg oral tablet: 0.5 tab(s) orally 2 times a day (26 Jun 2023 18:43)  furosemide 40 mg oral tablet: 1 tab(s) orally once a day (in the morning) (26 Jun 2023 18:43)  losartan 100 mg oral tablet: 1 tab(s) orally once a day (in the morning) (26 Jun 2023 18:43)  metoprolol tartrate 50 mg oral tablet: 0.25 tab(s) orally 2 times a day (26 Jun 2023 18:43)  pantoprazole 40 mg oral delayed release tablet: 1 tab(s) orally once a day as needed for  heartburn (26 Jun 2023 18:42)  predniSONE 10 mg oral tablet: 1 tab(s) orally once a day (in the morning) (26 Jun 2023 18:43)    MEDICATIONS  (STANDING):  apixaban 5 milliGRAM(s) Oral every 12 hours  atorvastatin 40 milliGRAM(s) Oral at bedtime  losartan 100 milliGRAM(s) Oral daily  metoprolol tartrate 12.5 milliGRAM(s) Oral two times a day  predniSONE   Tablet 10 milliGRAM(s) Oral daily    MEDICATIONS  (PRN):  pantoprazole    Tablet 40 milliGRAM(s) Oral before breakfast PRN for heartburn      Vital Signs Last 24 Hrs  T(C): 36.9 (29 Jun 2023 15:51), Max: 37.1 (28 Jun 2023 20:48)  T(F): 98.4 (29 Jun 2023 15:51), Max: 98.8 (28 Jun 2023 20:48)  HR: 74 (29 Jun 2023 15:51) (63 - 88)  BP: 115/68 (29 Jun 2023 15:51) (115/68 - 155/69)  BP(mean): --  RR: 18 (29 Jun 2023 15:51) (18 - 18)  SpO2: 95% (29 Jun 2023 15:51) (94% - 98%)    Parameters below as of 29 Jun 2023 15:51  Patient On (Oxygen Delivery Method): room air      I&O's Summary      ________________________________  GENERAL APPEARANCE:  No acute distress  HEAD: normocephalic, atraumatic  NECK: supple, no jugular venous distention, no carotid bruit    HEART: Regular rate and rhythm, S1, S2 normal, 1/6 murmur    CHEST:  No anterior chest wall tenderness    LUNGS:  Clear to auscultation, without any wheezing, rhonchi or rales    ABDOMEN: soft, nontender, nondistended, with positive bowel sounds appreciated  EXTREMITIES: no edema.   NEURO: Alert and oriented x3  PSYC:  Normal affect  SKIN:  Dry  ________________________________         ECG: Sinus Rhythm  w/ RBBB    LABS:                        11.4   8.27  )-----------( 187      ( 27 Jun 2023 07:38 )             38.5             06-27    143  |  110<H>  |  18  ----------------------------<  109<H>  3.2<L>   |  28  |  0.76    Ca    8.5      27 Jun 2023 07:38    TPro  5.4<L>  /  Alb  2.6<L>  /  TBili  0.6  /  DBili  x   /  AST  18  /  ALT  20  /  AlkPhos  56  06-27      Lipid Panel  Chl 117  HDL 52  LDL --  Trg 81  LIVER FUNCTIONS - ( 27 Jun 2023 07:38 )  Alb: 2.6 g/dL / Pro: 5.4 gm/dL / ALK PHOS: 56 U/L / ALT: 20 U/L / AST: 18 U/L / GGT: x         PT/INR - ( 27 Jun 2023 07:38 )   PT: 12.2 sec;   INR: 1.05 ratio         PTT - ( 27 Jun 2023 07:38 )  PTT:27.3 sec  Urinalysis Basic - ( 27 Jun 2023 07:38 )    Color: x / Appearance: x / SG: x / pH: x  Gluc: 109 mg/dL / Ketone: x  / Bili: x / Urobili: x   Blood: x / Protein: x / Nitrite: x   Leuk Esterase: x / RBC: x / WBC x   Sq Epi: x / Non Sq Epi: x / Bacteria: x      Pro BNP  -- 06-26 @ 15:16  D Dimer  <150 06-26 @ 15:16    PT/INR - ( 27 Jun 2023 07:38 )   PT: 12.2 sec;   INR: 1.05 ratio         PTT - ( 27 Jun 2023 07:38 )  PTT:27.3 sec  Urinalysis Basic - ( 27 Jun 2023 07:38 )    Color: x / Appearance: x / SG: x / pH: x  Gluc: 109 mg/dL / Ketone: x  / Bili: x / Urobili: x   Blood: x / Protein: x / Nitrite: x   Leuk Esterase: x / RBC: x / WBC x   Sq Epi: x / Non Sq Epi: x / Bacteria: x             ________________________________    RADIOLOGY & ADDITIONAL STUDIES: IMPRESSION:  1. Age-appropriate involutional changes. Nonacute infarct on the left at   the level the corona radiata, as on prior. Atherosclerotic changes. No   large arterial distribution acute infarct. No acute intracranial   hemorrhage.  2. Soft tissue process again appreciated within the region of the left   nasal cavity, possibly a polyp, which is stable in appearance compared   with the prior dated 11/19/2019. Nonetheless, consider further assessment   via direct visualization if patient has never had dedicated assessment of   this finding.    Findings were communicated by Dr. Behr Ventura to Dr. Zi Oseguera in the   emergency department at approximately 6:38 PM on 6/26/2023.    IMPRESSION: Left base process from 2021 largely cleared. There is some   linear atelectasis right lower lung at this time.    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  No evidence of ACS.    Stress that showed no ischemia.  Chest pain has resolved.  Edema improving.Transition to oral diuretic.  Continue anticoagulation.  Discharge planning on oral diuretic.          ____________________________________________  (Dragon Dictation software used). Thank you for allowing me to participate in the care of your patient. Please contact me should any questions arise.    YOUNG Manzano DO, [facility]  Office: [phone]

## 2023-06-29 NOTE — DISCHARGE NOTE NURSING/CASE MANAGEMENT/SOCIAL WORK - PATIENT PORTAL LINK FT
You can access the FollowMyHealth Patient Portal offered by [facility] by registering at the following website: [email or web address]. By joining [email or web address] FollowMyHealth portal, you will also be able to view your health information using other applications (apps) compatible with our system.

## 2023-06-29 NOTE — DISCHARGE NOTE PROVIDER - NSDCCPCAREPLAN_GEN_ALL_CORE_FT
PRINCIPAL DISCHARGE DIAGNOSIS  Diagnosis: Chest pain  Assessment and Plan of Treatment: please follow up with cardiology in 1 week   your eliquis dose has been increased by cardiology to 5mg twice a day      SECONDARY DISCHARGE DIAGNOSES  Diagnosis: Acute systolic congestive heart failure  Assessment and Plan of Treatment:

## 2023-07-06 DIAGNOSIS — M41.9 SCOLIOSIS, UNSPECIFIED: ICD-10-CM

## 2023-07-06 DIAGNOSIS — Z88.0 ALLERGY STATUS TO PENICILLIN: ICD-10-CM

## 2023-07-06 DIAGNOSIS — Z79.01 LONG TERM (CURRENT) USE OF ANTICOAGULANTS: ICD-10-CM

## 2023-07-06 DIAGNOSIS — I11.0 HYPERTENSIVE HEART DISEASE WITH HEART FAILURE: ICD-10-CM

## 2023-07-06 DIAGNOSIS — I45.10 UNSPECIFIED RIGHT BUNDLE-BRANCH BLOCK: ICD-10-CM

## 2023-07-06 DIAGNOSIS — Z95.5 PRESENCE OF CORONARY ANGIOPLASTY IMPLANT AND GRAFT: ICD-10-CM

## 2023-07-06 DIAGNOSIS — I50.33 ACUTE ON CHRONIC DIASTOLIC (CONGESTIVE) HEART FAILURE: ICD-10-CM

## 2023-07-06 DIAGNOSIS — Z86.73 PERSONAL HISTORY OF TRANSIENT ISCHEMIC ATTACK (TIA), AND CEREBRAL INFARCTION WITHOUT RESIDUAL DEFICITS: ICD-10-CM

## 2023-07-06 DIAGNOSIS — G40.909 EPILEPSY, UNSPECIFIED, NOT INTRACTABLE, WITHOUT STATUS EPILEPTICUS: ICD-10-CM

## 2023-07-06 DIAGNOSIS — I25.10 ATHEROSCLEROTIC HEART DISEASE OF NATIVE CORONARY ARTERY WITHOUT ANGINA PECTORIS: ICD-10-CM

## 2023-07-06 DIAGNOSIS — M35.2 BEHCET'S DISEASE: ICD-10-CM

## 2023-07-06 DIAGNOSIS — Z86.718 PERSONAL HISTORY OF OTHER VENOUS THROMBOSIS AND EMBOLISM: ICD-10-CM

## 2023-07-06 DIAGNOSIS — E78.5 HYPERLIPIDEMIA, UNSPECIFIED: ICD-10-CM

## 2023-07-06 DIAGNOSIS — E66.9 OBESITY, UNSPECIFIED: ICD-10-CM

## 2023-08-18 NOTE — ED ADULT TRIAGE NOTE - BSA (M2)
1.7 [facility]  Date of visit: 08/21/2023      Reason for Visit: Follow up for Mutliple Myeloma     Oncology History   Multiple myeloma not having achieved remission (H)   11/7/2018 -  Cancer Staged    Staging form: Plasma Cell Myeloma and Disorders, AJCC 8th Edition  - Clinical stage from 11/7/2018: Albumin (g/dL): 3.4, ISS: Stage II, High-risk cytogenetics: Absent, LDH: Unknown     11/7/2018 Initial Diagnosis    Multiple myeloma not having achieved remission (H)     1/23/2019 - 11/14/2019 Chemotherapy    KRD x 6 cycles     11/4/2019 - 2/14/2021 Chemotherapy    Rev/Dex(20)      4/3/2020 - 7/17/2020 Chemotherapy    Velcade maintenance - dose reductions for orthostatic hypotension     7/17/2020 - 12/15/2020 Chemotherapy    Revlimid maintenance     11/23/2020 Progression    Bone marrow 35% plasma cells, PET CT demonstrating new lytic lesions     12/15/2020 - 6/11/2021 Chemotherapy    Elsy, Kd x 6 cycles     7/7/2021 -  Cancer Staged    PET/CT - CR     7/21/2021 -  Chemotherapy    Subcutaneous Daratumumab, IVIG, denosumab monthly     11/22/2021 -  Cancer Staged    Bone marrow aspirate - MRD 0.0022% positive     2/1/2022 - 2/5/2022 Chemotherapy    IP BMT Chemotherapy For Mobilization - High Dose Cyclophosphamide  Plan Provider: Julio C Guillory MD  Treatment goal: Other  Line of treatment: [No plan line of treatment]     5/25/2022 - 11/23/2022 Chemotherapy    IP - OP BMT 2016-35 Auto Multiple Myeloma - Melphalan (CrCL < 30 mL/min, >/= 2 comorbidities, OR age > 75yrs) - Adult (Version: 7/13/21)  Plan Provider: Julio C Guillory MD  Treatment goal: Other  Line of treatment: [No plan line of treatment]     9/29/2022 - 3/9/2023 Supportive Treatment    Oral ONC - ixazomib maintenance post-transplant  Plan Provider: Julio C Guillory MD  Treatment goal: Curative  Line of treatment: [No plan line of treatment]     4/21/2023 -  Supportive Treatment    OP ONC Zoledronic Acid (Zometa) MONTHLY  Plan  Provider: Julio C Guillory MD  Treatment goal: Palliative  Line of treatment: Maintenance     4/24/2023 -  Chemotherapy    OP ONC Multiple Myeloma - Elotuzumab / Pomalidomide / Dexamethasone (<75 years old)  Plan Provider: Julio C Guillory MD  Treatment goal: Palliative  Line of treatment: Third Line       Interval History:  Libby presents to clinic for follow up post-hospitalization.  She is overall doing well, continues to feel fatigued.  She no longer is feeling flu-like, she completed her antibiotics this weekend.  Her appetite is slowly improving, taste/appetite waxes and wanes.    Reports having diarrhea last night, had two episodes of loose/watery stools. She took an Imodium, denies any diarrhea or abdominal pain.      Denies fevers/chills, incontinence, urinary changes, N/V, recent night sweats, SOB/cough, dental pain, runny nose, sore throat, rashes/sores, new pains.      She would like to have dental implants on her bottom teeth to help improve her ability to chew and eat. Discussed would not recommend while neutropenic.     ROS: 8 point ROS neg other than the symptoms noted above in the HPI.     PHYSICAL EXAM:   Vitals:  Blood pressure (!) 146/62, pulse 75, temperature 99.4  F (37.4  C), temperature source Oral, resp. rate 16, weight 49.3 kg (108 lb 11.2 oz), SpO2 100 %.      Wt Readings from Last 4 Encounters:   08/21/23 49.3 kg (108 lb 11.2 oz)   08/10/23 49.8 kg (109 lb 12.8 oz)   08/08/23 49 kg (108 lb)   06/26/23 47.7 kg (105 lb 1.6 oz)     General: No acute distress  HEENT: Sclera anicteric. Oral mucosa pink and moist.  No mucositis or thrush  Heart: Regular, rate, and rhythm  Lungs: Clear to ascultation bilaterally  Abdomen: Positive bowel sounds. Soft, non-distended, non-tender. No organomegaly or mass.   Extremities: no lower extremity edema  Neuro: Cranial nerves grossly intact  Rash: none on exposed skin  Vascular access: port    Labs:  Most Recent 3 CBC's:  Recent Labs   Lab Test 08/21/23  1236  "08/11/23  0458 08/10/23  0619 08/09/23  0627 08/08/23  1309   WBC 2.9* 2.4* 3.5*   < > 8.2   HGB 9.8* 7.7* 7.4*   < > 9.5*   * 109* 110*   < > 110*    92* 111*   < > 107*   ANEUTAUTO 0.5* 1.1*  --   --  7.2    < > = values in this interval not displayed.     Most Recent 3 BMP's:  Recent Labs   Lab Test 08/21/23  1236 08/11/23  0458 08/10/23  2001 08/10/23  1501 08/10/23  0619    140  --   --  142   POTASSIUM 3.3* 3.4 3.5   < > 3.2*   CHLORIDE 107 109*  --   --  111*   CO2 26 22  --   --  22   BUN 10.5 4.5*  --   --  7.0*   CR 1.02* 0.91  --   --  1.06*   ANIONGAP 8 9  --   --  9   DIANDRA 8.8 8.3*  --   --  8.1*   * 92  --   --  74   PROTTOTAL 5.6* 4.6*  --   --  4.6*   ALBUMIN 3.4* 2.8*  --   --  2.6*    < > = values in this interval not displayed.    Most Recent 3 LFT's:  Recent Labs   Lab Test 08/21/23  1236 08/11/23  0458 08/10/23  0619   AST 21 22 24   ALT 5 20 21   ALKPHOS 84 84 87   BILITOTAL 0.3 0.3 0.3    Most Recent 2 TSH and T4:  Recent Labs   Lab Test 08/08/23  1309   TSH 11.30*   T4 1.06     I reviewed the above labs today.     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      Myeloma/BMT/IEC PROTOCOL for 2016-35  - Chemo protocol: HD Melphalan 140mg/m2  Day 0: Cell dose: 3.94x10^6 (s/p cytoxan chemo priming 2/1/2022 and subsequent stem cell collection).   - Restaging plan: per protocol.  - Maintenance Ninlaro started early Oct 2022; Day 28 (11/4) and stopped in January 2023  - Elotuzumab, pomalidomide, dexamethasone initiated April 2023  - Most recent dose (C3D1) 6/26.   - Was planned for C4 on 7/26, however dose was not given for unclear reasons.  - Libby presented to clinic for C4 8/8 to re-establish treatment schedule and treatment was held and patient was sent to the ED due to rigors, difficulty answering questions/confusion, and overall unwell appearance  - Per patient report, she \"believes\" she has been taking pomalidomide daily " "since C3D1 up until day of admission (8/8)  - Pomolidomide was held at the time of discharge  - 8/21 Discussed rising FLC and neutropenia with Dr. Guillory- will set patient up for Elotuzumab ASAP and get her back on treatment    Pancytopenia   2/2 chemotherapy and underlying disease. Near baseline, non-neutropenic. Slightly worsened thrombocytopenia likely from sepsis  - Transfuse to maintain Hgb >7, Plt >10K  - Type & Screen MWF    Neutropenia:  8/21 ANC 0.5- reinforced neutropenic precautions and if patient develops a fever >/= 100.4 F she needs to be seen in the ED, patient verbalized understanding  - Discussed with Dr. Guillory- plan to treat patient even if neutropenic  - If continues with cytopenias could consider a bone marrow      HEME/COAG  - Relevant thrombosis or bleeding history:   2/15/22: new non-occlusive LUE DVT. No longer on xarelto. On ASA.     Ppx:  - PTA Acyclovir 400 mg BID- patient has not been taking, script sent today (8/21)  - PJP ppx bactrim BID on Monday/Tuesdays  - Levofloxacin 250 mg PO- if ANC <1000  - Antifungal ppx, held d/t interaction with Methadone    Sepsis, 2/2 to presumed pulmonary infection   Atypical pneumonia  Underlying COPD  Presented to heme/onc clinic 8/8 to reestablish for treatment schedule, noted to be feeling poorly with the \"flu \"x 1 week.  She was referred to the emergency department for further evaluation given ongoing rigors, difficulty answering questions/confusion, and overall unwell appearance after 30 minutes of monitoring.  High risk for infection given immunosuppressed state with multiple myeloma and palliative chemotherapy.  History of recurrent UTIs and repeat pneumonia.  Per report presented with diarrhea/suprapubic tenderness, productive cough.  Found to be febrile to 102, elevated lactate (2.3), and elevated procalcitonin (1.27) without clear source of infection.  Chest x-ray noting increased upper and lower interstitial reticular/nodular patterns " raising concern for atypical infection.  Additional infectious workup including RSV, influenza A/B, COVID unremarkable  - Started on broad-spectrum antibiotics  - Zosyn 8/8-8/10  - Vancomycin 8/8 - 8/9  - Transitioned to oral levofloxacin 750 mg q48h (renally dosed) 8/10/2023 - plan for total of 7 day abx course (thru ~8/16)  - BCx 8/8 NGTD  - UCx 8/8 with < 10,000 CFU/mL urogenital cricket  - RVP, legionella Ag, MRSA, strep pneumo Ag negative  - Completed antibiotics, denies s/s of infection       CARDIOVASCULAR  - Risk of cardiomyopathy:  Baseline EF 56%  - known hyperlipidemia- per notes previously on Crestor but not on med list - follow-up with PCP if needed  - History of Afib - now in NSR but tachy. Continue metoprolol XL 25 mg/d      RESPIRATORY  COPD  COPD with 40pyr, quit 2010. Previously documented to have idiopathic pulmonary fibrosis, however most recent PFTs (1/26/22) were normal. Has previously required treatment for COPD exacerbation with pneumonia. Some expiratory wheezing on exam improved with Duonebs; continue home inhaler regimen.  - PTA Trelegy Ellipta inhaler qd  - PTA Albuterol BID PRN     Fluids/Electrolytes/Nutrition   - IVF bolus prn  - PRN lyte replacement per standing protocol  - Regular diet as tolerated   - K + 3.3- 20 mEq BID x2 days    PSYCH  Generalized Anxiety Disorder  - PTA Venlafaxine 75mg TID     Opiate dependence  - Methadone 10mg TID    ENDO  Hypothyroidism  History of Graves Disease  History of hypothyroidism 2/2 treatment for Graves Disease (patient reports surgery in [place], however not thyroidectomy). Stable dose of levothyroxine over the past four years. Upon admission, elevated TSH (11.3) with normal T4 (1.07) consistent with subclinical hypothyroidism. Following recovery, recommend outpatient follow-up with PCP to retest TSH/T4, consider dose adjustment.  - PTA Levothyroxine 112mcg every day      SYMPTOM MANAGEMENT.  Pain Assessment:  - Libby is experiencing pain due to  chronic pain - Continue suboxone and prn oxycodone.   - 8/21 Denies pain at today's visit      Transition Care Management Services  Admit Date: 8/8/23  Discharge Date: 8/11/23  Interactive contact date: 8/14/2023  Face-to-face visit date: 8/21/2023        Medical complexity decision making: Moderate complexity ([id number])    QUAN Turner CNP

## 2023-09-03 NOTE — DISCHARGE NOTE NURSING/CASE MANAGEMENT/SOCIAL WORK - NSDPACMPNY_GEN_ALL_CORE
All goals met for discharge      Problem: Pain  Goal: #Acceptable pain level achieved/maintained at rest using NRS/Faces  Description: This goal is used for patients who can self-report.  Acceptable means the level is at or below the identified comfort/function goal.  Outcome: Outcome Met, Complete Goal  Goal: # Acceptable pain level achieved/maintained at rest using NRS/Faces without oversedation (opioid naive or PCA/Epidural infusion)  Description: This goal is used if Opioid-naïve or on PCA/Epidural Infusion.  Outcome: Outcome Met, Complete Goal  Goal: # Acceptable pain level achieved/maintained with activity using NRS/Faces  Description: This goal is used for patients who can self-report and are not achieving acceptable pain control during activity.  Outcome: Outcome Met, Complete Goal  Goal: # Verbalizes understanding of pain management  Description: Documented in Patient Education Activity  Outcome: Outcome Met, Complete Goal     Problem: At Risk for Falls  Goal: # Patient does not fall  Outcome: Outcome Met, Complete Goal  Goal: # Takes action to control fall-related risks  Outcome: Outcome Met, Complete Goal  Goal: # Verbalizes understanding of fall risk/precautions  Description: Document education using the patient education activity  Outcome: Outcome Met, Complete Goal          Family

## 2023-09-13 NOTE — ED ADULT NURSE NOTE - NSSISCREENINGQ5_ED_A_ED
Transitional Care Management Visit       Patient has given consent to record this visit for documentation in their clinical record.      Admit - Discharge Date: 09/05/2023 - 09/06/2023.  Name of hospital: [facility].  Today's visit 09/13/2023 is 7 days after discharge.    Darío Calvert is a 62 year old here for Transitional Care Management (9-6-23Acute cystitis ,Sepsis )      Historian: Self.  She is gradually feeling better.  On August 28, 2023, she started feeling unwell. Was feeling sick throughout the week. On September 01, 2023, she started having chills at night. Had chills the next day as well. Was unable to concentrate. Could not eat. She thought she had a viral infection. Had vomiting. She slept most of the day. Was having fever intermittently. States fever was not extremely high. For a couple of days she was unable to keep down any of her medications. Took COVID test at home.  On September 03, 2023, she was having the same symptoms. Had chills and rigors. Her daughter came over and called an ambulance for her. Was informed to have sepsis due to urinary tract infection. A while ago, she had burning urination (only once) which had resolved. Did not have any other symptoms of urinary tract infection. Saw infectious disease specialist, Dr. Pulido, at the hospital. Inquires if sepsis and urinary tract infection can recur.  Since April or May 2023, she has been having chronic diarrhea. She thought diarrhea was due to her diet, so she did not take any medication for diarrhea. Was treated for E. coli at the hospital. She thought diarrhea had resolved after the hospital stay, but this morning she had diarrhea after eating. States that the diarrhea is not as severe as it was previously.  Was following up with gastroenterologist, Dr. Trejo, in the past. She was following up with gastroenterologist, Dr. Ibarra, in between. Infectious disease specialist, Dr. Pulido, advised her to follow up with  gastroenterologist, Dr. Trejo. Will see gastroenterologist, Dr. Trejo, Kaz Mcdonough MD  ATTENDING PHYSICIAN:  Kaz Mcdonough heart sounds.   Pulmonary:      Effort: Pulmonary effort is normal. No respiratory distress.      Breath sounds: Normal breath sounds.   Abdominal:      General: Bowel sounds are normal. There is no distension.      Palpations: Abdomen is soft.      Tenderness: There is no abdominal tenderness. There is no guarding or rebound.   Skin:     General: Skin is warm and dry.   Neurological:      Mental Status: She is alert.   Psychiatric:         Behavior: Behavior normal.         Thought Content: Thought content normal.           ASSESSMENT AND PLAN       1. Hospital discharge follow-up  2. History of hypokalemia  -     Basic Metabolic Panel; Future  3. Acute cystitis without hematuria  4. Chronic diarrhea  5. Solar purpura (CMD)  6. Chronic hepatitis B (CMD)    1. Hospital discharge follow-up  3. Acute cystitis without hematuria  Explained that she is prone to sepsis due to immunosuppression.  Explained that urinary tract infection can recur.  Explained that Acyclovir is used for treatment of the cold sores she had during the hospitalization.  Advised to follow up with gastroenterologist, Dr. Trejo, as scheduled next week.  Can go back to the gym after 1-1.5 weeks, but needs to decrease the length of time she spends on the machine and decrease the intensity of exercise by 50%. If feeling better, then she can gradually increase her exercise.  If she is feeling down and dragging without any reason, then notify us as this could be a sign of infection.  If she is having fever at 100.4 °F then notify us and go to the urgent care or emergency room.    2. History of hypokalemia  Potassium level has normalized in recent labs.  Labs done at the hospital showed low potassium level around 2.9 mmol/L. Labs done on September 12, 2023, showed normal potassium level.  Explained that electrolyte imbalance is commonly seen if a person is not eating well.  Discontinue Potassium Chloride ER.  Advised to repeat the basic metabolic panel in  Edmonds Imaging/UltrasoundUniversity Hospitals St. John Medical Center [facility] Annita Sanabria Lobo Robles MD       No

## 2023-10-12 NOTE — PHYSICAL THERAPY INITIAL EVALUATION ADULT - THERAPY FREQUENCY, PT EVAL
5-7x/week Spironolactone Pregnancy And Lactation Text: This medication can cause feminization of the male fetus and should be avoided during pregnancy. The active metabolite is also found in breast milk.

## 2023-11-08 NOTE — PHYSICAL THERAPY INITIAL EVALUATION ADULT - IMPAIRMENTS FOUND, PT EVAL
NURSE ANTICOAGULATION FAX    2 week INR result received from [facility] on [address] 1966.    No outpatient medications have been marked as taking for the 11/8/23 encounter (Anti-Coag) with Giovany Pina MD.       INR (no units)   Date Value   11/08/2023 2.5     GOAL RANGE: 2.0-3.0    ALLERGIES:   Allergen Reactions   • Ceftriaxone Other (See Comments)     Developed leukopenia on long term treatment (4 weeks)       Patient Active Problem List   Diagnosis   • Osteoporosis   • Intellectual disability   • Hematuria   • Hyperlipidemia   • Herpes simplex   • Elevated troponin   • Anemia   • Cardiomegaly   • Acute and subacute bacterial endocarditis   • Bicuspid aortic valve   • Aortic valve regurgitation   • Hospital-acquired pneumonia   • Hypoxia   • History of aortic valve replacement   • Long term (current) use of anticoagulants   • Encounter for therapeutic drug monitoring   • Unspecified hearing loss, left ear   • Mixed conductive and sensorineural hearing loss of left ear with unrestricted hearing of right ear   • Heart failure (CMD)   • Chest pain   • Lesion of basal ganglia   • Ischemic stroke (CMD)   • Fall       PATIENT REPORTED CHANGES: Medications as above, no written changes with medications, diet or other concerns per nursing home fax.    NURSE DOSING ADJUSTMENTS AND EDUCATION: Will continue regular warfarin dosing.      Patient will recheck INR in 2 weeks, sooner if changes or concerns develop.  No recent hospitalizations per nursing home. Warfarin Management done via fax.   Sent to Dr. Sánchez for review and signature.     gait, locomotion, and balance

## 2023-11-14 NOTE — ED ADULT NURSE NOTE - HARM RISK FACTORS
Daily Note     Today's date: 2023  Patient name: Stanislav Hunter  : 1943  MRN: [id number]  Referring provider: Eddy Castaneda MD  Dx:   Encounter Diagnosis     ICD-10-CM    1. Primary parkinsonism  G20. C           Start Time:   Stop Time: 0  Total time in clinic (min): 45 minutes    Subjective: Pt reports nothing new. Felt good after last session. Objective: See treatment diary below  Post session: 125/72 LUE seated    Assessment:  Pt reports working harder vs prior session. Increasing repetition of exercises and intensity as able. Introduced medicine ball rotations, with mild imbalance but able to adjust appropriately. In narrow space increased steps to turn 180* vs in more open area. Good ROM with overhead & rotational activities, no diminishing ROM with repetition. Patient would benefit from continued PT      Plan: Continue per plan of care. Large amplitude power. Dynamic balance. Precautions: h/o tinnitus, h/o PD, h/o peripheral neuropathy,h/o HTN, B/L TKR  Re-eval Date:  2023    Date 11/1 11/7 11/10 11/14    Visit Count 1 2 3 4    FOTO See IE       Pain In See IE       Pain Out                Testing        TUG/C 9.15 / 10.60 50 > 41 (37 > 28 by 4's error)       30s chair stand 14       5xSTS 9.74       Gait speed 0.90 no AD / 0.82 SPC       ABC 64       MiniBest 20       FGA 16       6MWT 1053ft SPC       Neuro Re-Ed        Dynamic balance  Bwd stepping 6 laps agility ladder CGA    Low hurdles lat step 8 laps Cw/ccw hurdles multidirectional step x4 ea direction         Static balance    11lb med ball rotation toss cues for power 2x10    Obstacle course        Cognitive dual task        Large amplitude movement  8lb med ball slam 2x8    8lb med ball sit<>stand + OH press 2x10 foam on chair Agility ladder 1-2 boxes 3x5 8lb med ball slams.      White chair sit<>stand UE raise 2x10 Agility ladder 1 box 2 laps x4 + 8lb med ball slam 4x8    Regular chair 11lb med ball 2x10, 15kb hold 1x10                    Ther Ex        SLR x 4        HR/TR        Mini Squats        Step ups        Unilat row   25lb 2x10 2x14 35lb    Upright Y   YTB 3x10 OTB 3x14    Leg Press    155lb 1x14, 1x20            Ther Activity        ADL                Gait Training        Divided Attention        Head turns        Stairs   3 flights x2, 2 flights x1 HR 108bpm     Modalities         prn yes

## 2024-01-05 NOTE — ED ADULT TRIAGE NOTE - INTERNATIONAL TRAVEL
Problem: Adult Inpatient Plan of Care  Goal: Plan of Care Review  Outcome: Ongoing, Progressing   Chart and care plan reviewed   No

## 2024-01-31 ENCOUNTER — INPATIENT (INPATIENT)
Facility: HOSPITAL | Age: 79
LOS: 0 days | Discharge: HOME | DRG: 286 | End: 2024-02-01
Attending: STUDENT IN AN ORGANIZED HEALTH CARE EDUCATION/TRAINING PROGRAM | Admitting: INTERNAL MEDICINE
Payer: MEDICARE

## 2024-01-31 VITALS
TEMPERATURE: 98 F | DIASTOLIC BLOOD PRESSURE: 68 MMHG | HEART RATE: 52 BPM | WEIGHT: 160.06 LBS | RESPIRATION RATE: 18 BRPM | OXYGEN SATURATION: 97 % | HEIGHT: 65 IN | SYSTOLIC BLOOD PRESSURE: 168 MMHG

## 2024-01-31 DIAGNOSIS — T50.1X6A UNDERDOSING OF LOOP [HIGH-CEILING] DIURETICS, INITIAL ENCOUNTER: ICD-10-CM

## 2024-01-31 DIAGNOSIS — G40.909 EPILEPSY, UNSPECIFIED, NOT INTRACTABLE, WITHOUT STATUS EPILEPTICUS: ICD-10-CM

## 2024-01-31 DIAGNOSIS — Z86.73 PERSONAL HISTORY OF TRANSIENT ISCHEMIC ATTACK (TIA), AND CEREBRAL INFARCTION WITHOUT RESIDUAL DEFICITS: ICD-10-CM

## 2024-01-31 DIAGNOSIS — Z79.01 LONG TERM (CURRENT) USE OF ANTICOAGULANTS: ICD-10-CM

## 2024-01-31 DIAGNOSIS — I25.10 ATHEROSCLEROTIC HEART DISEASE OF NATIVE CORONARY ARTERY WITHOUT ANGINA PECTORIS: ICD-10-CM

## 2024-01-31 DIAGNOSIS — Z90.710 ACQUIRED ABSENCE OF BOTH CERVIX AND UTERUS: ICD-10-CM

## 2024-01-31 DIAGNOSIS — Z11.52 ENCOUNTER FOR SCREENING FOR COVID-19: ICD-10-CM

## 2024-01-31 DIAGNOSIS — R06.02 SHORTNESS OF BREATH: ICD-10-CM

## 2024-01-31 DIAGNOSIS — Z79.52 LONG TERM (CURRENT) USE OF SYSTEMIC STEROIDS: ICD-10-CM

## 2024-01-31 DIAGNOSIS — R00.1 BRADYCARDIA, UNSPECIFIED: ICD-10-CM

## 2024-01-31 DIAGNOSIS — Z91.148 PATIENT'S OTHER NONCOMPLIANCE WITH MEDICATION REGIMEN FOR OTHER REASON: ICD-10-CM

## 2024-01-31 DIAGNOSIS — Z88.0 ALLERGY STATUS TO PENICILLIN: ICD-10-CM

## 2024-01-31 DIAGNOSIS — M41.9 SCOLIOSIS, UNSPECIFIED: ICD-10-CM

## 2024-01-31 DIAGNOSIS — Z86.718 PERSONAL HISTORY OF OTHER VENOUS THROMBOSIS AND EMBOLISM: ICD-10-CM

## 2024-01-31 DIAGNOSIS — E78.5 HYPERLIPIDEMIA, UNSPECIFIED: ICD-10-CM

## 2024-01-31 DIAGNOSIS — M54.50 LOW BACK PAIN, UNSPECIFIED: ICD-10-CM

## 2024-01-31 DIAGNOSIS — Z95.5 PRESENCE OF CORONARY ANGIOPLASTY IMPLANT AND GRAFT: ICD-10-CM

## 2024-01-31 DIAGNOSIS — M35.2 BEHCET'S DISEASE: ICD-10-CM

## 2024-01-31 DIAGNOSIS — I50.33 ACUTE ON CHRONIC DIASTOLIC (CONGESTIVE) HEART FAILURE: ICD-10-CM

## 2024-01-31 DIAGNOSIS — I11.0 HYPERTENSIVE HEART DISEASE WITH HEART FAILURE: ICD-10-CM

## 2024-01-31 DIAGNOSIS — Z90.710 ACQUIRED ABSENCE OF BOTH CERVIX AND UTERUS: Chronic | ICD-10-CM
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
## 2024-01-31 LAB
ALBUMIN SERPL ELPH-MCNC: 3.2 G/DL — LOW (ref 3.3–5)
ALP SERPL-CCNC: 84 U/L — SIGNIFICANT CHANGE UP (ref 40–120)
ALT FLD-CCNC: 30 U/L — SIGNIFICANT CHANGE UP (ref 12–78)
ANION GAP SERPL CALC-SCNC: 1 MMOL/L — LOW (ref 5–17)
ANISOCYTOSIS BLD QL: SIGNIFICANT CHANGE UP
APTT BLD: 32.1 SEC — SIGNIFICANT CHANGE UP (ref 24.5–35.6)
AST SERPL-CCNC: 21 U/L — SIGNIFICANT CHANGE UP (ref 15–37)
BASOPHILS # BLD AUTO: 0.02 K/UL — SIGNIFICANT CHANGE UP (ref 0–0.2)
BASOPHILS NFR BLD AUTO: 0.3 % — SIGNIFICANT CHANGE UP (ref 0–2)
BILIRUB SERPL-MCNC: 0.6 MG/DL — SIGNIFICANT CHANGE UP (ref 0.2–1.2)
BUN SERPL-MCNC: 21 MG/DL — SIGNIFICANT CHANGE UP (ref 7–23)
CALCIUM SERPL-MCNC: 9 MG/DL — SIGNIFICANT CHANGE UP (ref 8.5–10.1)
CHLORIDE SERPL-SCNC: 112 MMOL/L — HIGH (ref 96–108)
CO2 SERPL-SCNC: 28 MMOL/L — SIGNIFICANT CHANGE UP (ref 22–31)
CREAT SERPL-MCNC: 0.86 MG/DL — SIGNIFICANT CHANGE UP (ref 0.5–1.3)
EGFR: 69 ML/MIN/1.73M2 — SIGNIFICANT CHANGE UP
ELLIPTOCYTES BLD QL SMEAR: SLIGHT — SIGNIFICANT CHANGE UP
EOSINOPHIL # BLD AUTO: 0.03 K/UL — SIGNIFICANT CHANGE UP (ref 0–0.5)
EOSINOPHIL NFR BLD AUTO: 0.4 % — SIGNIFICANT CHANGE UP (ref 0–6)
FLUAV AG NPH QL: SIGNIFICANT CHANGE UP
FLUBV AG NPH QL: SIGNIFICANT CHANGE UP
GLUCOSE SERPL-MCNC: 131 MG/DL — HIGH (ref 70–99)
HCT VFR BLD CALC: 38.7 % — SIGNIFICANT CHANGE UP (ref 34.5–45)
HGB BLD-MCNC: 11.3 G/DL — LOW (ref 11.5–15.5)
IMM GRANULOCYTES NFR BLD AUTO: 0.3 % — SIGNIFICANT CHANGE UP (ref 0–0.9)
INR BLD: 0.99 RATIO — SIGNIFICANT CHANGE UP (ref 0.85–1.18)
LYMPHOCYTES # BLD AUTO: 1.39 K/UL — SIGNIFICANT CHANGE UP (ref 1–3.3)
LYMPHOCYTES # BLD AUTO: 18.8 % — SIGNIFICANT CHANGE UP (ref 13–44)
MAGNESIUM SERPL-MCNC: 2.2 MG/DL — SIGNIFICANT CHANGE UP (ref 1.6–2.6)
MANUAL SMEAR VERIFICATION: SIGNIFICANT CHANGE UP
MCHC RBC-ENTMCNC: 19.5 PG — LOW (ref 27–34)
MCHC RBC-ENTMCNC: 29.2 GM/DL — LOW (ref 32–36)
MCV RBC AUTO: 66.8 FL — LOW (ref 80–100)
MICROCYTES BLD QL: SIGNIFICANT CHANGE UP
MONOCYTES # BLD AUTO: 0.22 K/UL — SIGNIFICANT CHANGE UP (ref 0–0.9)
MONOCYTES NFR BLD AUTO: 3 % — SIGNIFICANT CHANGE UP (ref 2–14)
NEUTROPHILS # BLD AUTO: 5.72 K/UL — SIGNIFICANT CHANGE UP (ref 1.8–7.4)
NEUTROPHILS NFR BLD AUTO: 77.2 % — HIGH (ref 43–77)
NT-PROBNP SERPL-SCNC: 883 PG/ML — HIGH (ref 0–450)
OVALOCYTES BLD QL SMEAR: SLIGHT — SIGNIFICANT CHANGE UP
PLAT MORPH BLD: NORMAL — SIGNIFICANT CHANGE UP
PLATELET # BLD AUTO: 142 K/UL — LOW (ref 150–400)
POIKILOCYTOSIS BLD QL AUTO: SLIGHT — SIGNIFICANT CHANGE UP
POTASSIUM SERPL-MCNC: 4 MMOL/L — SIGNIFICANT CHANGE UP (ref 3.5–5.3)
POTASSIUM SERPL-SCNC: 4 MMOL/L — SIGNIFICANT CHANGE UP (ref 3.5–5.3)
PROT SERPL-MCNC: 6.6 GM/DL — SIGNIFICANT CHANGE UP (ref 6–8.3)
PROTHROM AB SERPL-ACNC: 11.2 SEC — SIGNIFICANT CHANGE UP (ref 9.5–13)
RBC # BLD: 5.79 M/UL — HIGH (ref 3.8–5.2)
RBC # FLD: 18 % — HIGH (ref 10.3–14.5)
RBC BLD AUTO: ABNORMAL
RSV RNA NPH QL NAA+NON-PROBE: SIGNIFICANT CHANGE UP
SARS-COV-2 RNA SPEC QL NAA+PROBE: SIGNIFICANT CHANGE UP
SODIUM SERPL-SCNC: 141 MMOL/L — SIGNIFICANT CHANGE UP (ref 135–145)
TARGETS BLD QL SMEAR: SLIGHT — SIGNIFICANT CHANGE UP
TROPONIN I, HIGH SENSITIVITY RESULT: 25.42 NG/L — SIGNIFICANT CHANGE UP
TROPONIN I, HIGH SENSITIVITY RESULT: 28.43 NG/L — SIGNIFICANT CHANGE UP
WBC # BLD: 7.4 K/UL — SIGNIFICANT CHANGE UP (ref 3.8–10.5)
WBC # FLD AUTO: 7.4 K/UL — SIGNIFICANT CHANGE UP (ref 3.8–10.5)

## 2024-01-31 PROCEDURE — 99223 1ST HOSP IP/OBS HIGH 75: CPT

## 2024-01-31 PROCEDURE — 93010 ELECTROCARDIOGRAM REPORT: CPT

## 2024-01-31 PROCEDURE — 36415 COLL VENOUS BLD VENIPUNCTURE: CPT

## 2024-01-31 PROCEDURE — 93458 L HRT ARTERY/VENTRICLE ANGIO: CPT

## 2024-01-31 PROCEDURE — C1769: CPT

## 2024-01-31 PROCEDURE — 71045 X-RAY EXAM CHEST 1 VIEW: CPT | Mod: 26

## 2024-01-31 PROCEDURE — C1894: CPT

## 2024-01-31 PROCEDURE — 99285 EMERGENCY DEPT VISIT HI MDM: CPT

## 2024-01-31 PROCEDURE — C1760: CPT

## 2024-01-31 PROCEDURE — 84484 ASSAY OF TROPONIN QUANT: CPT

## 2024-01-31 PROCEDURE — C1887: CPT

## 2024-01-31 RX ORDER — ACETAMINOPHEN 500 MG
650 TABLET ORAL EVERY 6 HOURS
Refills: 0 | Status: DISCONTINUED | OUTPATIENT
Start: 2024-01-31 | End: 2024-02-01

## 2024-01-31 RX ORDER — PANTOPRAZOLE SODIUM 20 MG/1
40 TABLET, DELAYED RELEASE ORAL
Refills: 0 | Status: DISCONTINUED | OUTPATIENT
Start: 2024-01-31 | End: 2024-02-01

## 2024-01-31 RX ORDER — LOSARTAN POTASSIUM 100 MG/1
100 TABLET, FILM COATED ORAL DAILY
Refills: 0 | Status: DISCONTINUED | OUTPATIENT
Start: 2024-01-31 | End: 2024-02-01

## 2024-01-31 RX ORDER — ONDANSETRON 8 MG/1
4 TABLET, FILM COATED ORAL EVERY 6 HOURS
Refills: 0 | Status: DISCONTINUED | OUTPATIENT
Start: 2024-01-31 | End: 2024-02-01

## 2024-01-31 RX ORDER — APIXABAN 2.5 MG/1
5 TABLET, FILM COATED ORAL EVERY 12 HOURS
Refills: 0 | Status: DISCONTINUED | OUTPATIENT
Start: 2024-01-31 | End: 2024-01-31

## 2024-01-31 RX ORDER — HEPARIN SODIUM 5000 [USP'U]/ML
5000 INJECTION INTRAVENOUS; SUBCUTANEOUS EVERY 12 HOURS
Refills: 0 | Status: DISCONTINUED | OUTPATIENT
Start: 2024-01-31 | End: 2024-02-01

## 2024-01-31 RX ORDER — FUROSEMIDE 40 MG
40 TABLET ORAL ONCE
Refills: 0 | Status: COMPLETED | OUTPATIENT
Start: 2024-01-31 | End: 2024-01-31

## 2024-01-31 RX ORDER — ATORVASTATIN CALCIUM 80 MG/1
40 TABLET, FILM COATED ORAL AT BEDTIME
Refills: 0 | Status: DISCONTINUED | OUTPATIENT
Start: 2024-01-31 | End: 2024-02-01

## 2024-01-31 RX ORDER — METOPROLOL TARTRATE 50 MG
12.5 TABLET ORAL
Refills: 0 | Status: DISCONTINUED | OUTPATIENT
Start: 2024-01-31 | End: 2024-02-01

## 2024-01-31 RX ORDER — FUROSEMIDE 40 MG
1 TABLET ORAL
Refills: 0 | DISCHARGE

## 2024-01-31 RX ADMIN — ATORVASTATIN CALCIUM 40 MILLIGRAM(S): 80 TABLET, FILM COATED ORAL at 21:21

## 2024-01-31 RX ADMIN — HEPARIN SODIUM 5000 UNIT(S): 5000 INJECTION INTRAVENOUS; SUBCUTANEOUS at 21:20

## 2024-01-31 RX ADMIN — Medication 40 MILLIGRAM(S): at 18:01

## 2024-01-31 NOTE — ED PROVIDER NOTE - DIFFERENTIAL DIAGNOSIS
includes but is not limited to acs, electrolyte abnl, gi bleed, less likely pe given on eliquis, arrythmia, no infectious sxs Differential Diagnosis

## 2024-01-31 NOTE — ED PROVIDER NOTE - PROGRESS NOTE DETAILS
d/w dr brooks attendign cardiologist, rec admit to tele, hold bblocker and ccb, hold eliquis for possible cath . MD LUANN

## 2024-01-31 NOTE — ED ADULT TRIAGE NOTE - CHIEF COMPLAINT QUOTE
Sent in from MD Lynne office for abnormal EKG. Denies any complaints of CP/SOB. VS stable at triage, no distress noted.

## 2024-01-31 NOTE — ED ADULT NURSE NOTE - OBJECTIVE STATEMENT
Patient here sent in by doctor for symptomatic bradycardia with shortness of breath while ambulating. 20 G IV inserted into left A/C. Cardiac monitor in place.

## 2024-01-31 NOTE — PATIENT PROFILE ADULT - FALL HARM RISK - HARM RISK INTERVENTIONS

## 2024-01-31 NOTE — PHARMACOTHERAPY INTERVENTION NOTE - INTERVENTION TYPE MED REC
Med Rec - Admission [TextEntry] : Claudette Barajas [facility] [address] [place] [address] [phone]

## 2024-01-31 NOTE — ED PROVIDER NOTE - CLINICAL SUMMARY MEDICAL DECISION MAKING FREE TEXT BOX
plan for heart monitor, labs, ekg, tba tele. plan for heart monitor, labs, ekg, tba tele.    d/w hospitalist dr pabon for admit. MD LUANN

## 2024-01-31 NOTE — ED PROVIDER NOTE - OBJECTIVE STATEMENT
78 yo F with BIB son , with PMHx of CHF, CVA, Behcet's s yndrome, CAD w/stent, DVT on Eliquis, HTN, lumbago with saw pmd today Dr Edwards for 1 month of progressing SOB. Dr edwards sent her to Dr Weir and Dr Weir sent her ot ED for abnormal EKG for symptomatic bradycardia and new twi V2-V6.  No cp. No fever. no cough. No n/v/d.      Son is .

## 2024-01-31 NOTE — H&P ADULT - HISTORY OF PRESENT ILLNESS
· 78 yo F with PMHx of CHF, HFpEF, CVA, Behcet's syndrome, CAD w/stent, DVT on Eliquis, HTN, lumbago with saw pmd today Dr Davidson for 1 month of progressing SOB and LE swelling.   Patient stopped diuretic on her own a few mo ago due to frequent urination.  No fever/chills, no cold symptoms no coughing   No increased salt intake per son    PAST MEDICAL HISTORY:  Asthmatic bronchitis   Behcet's syndrome   CHF (congestive heart failure)   CVA (cerebral vascular accident)   DVT (deep venous thrombosis)   HLD (hyperlipidemia)   HTN (hypertension)   Lumbago   Nonintractable epilepsy without status epilepticus 2/23/2018  Scoliosis.     PAST SURGICAL HISTORY:  S/P hysterectomy.     FAMILY HISTORY:  2 sons have CAD    Social Hx:   no smoking, no Etoh        REVIEW OF SYSTEMS:    CONSTITUTIONAL: No weakness, No fevers or chills  ENT: No ear ache, No sorethroat  NECK: No pain, No stiffness  RESPIRATORY: No cough, No wheezing, No hemoptysis; No dyspnea  CARDIOVASCULAR: No chest pain, No palpitations  GASTROINTESTINAL: No abd pain, No nausea, No vomiting, No hematemesis, No diarrhea or constipation. No melena, No hematochezia.  GENITOURINARY: No dysuria, No  hematuria  NEUROLOGICAL: No diplopia, No paresthesia, No motor dysfunction  MUSCULOSKELETAL: No arthralgia, No myalgia  SKIN: No rashes, or lesions   PSYCH: no anxiety, no suicidal ideation    All other review of systems is negative unless indicated above    Vital Signs Last 24 Hrs  T(C): 37.1 (31 Jan 2024 15:04), Max: 37.1 (31 Jan 2024 15:04)  T(F): 98.7 (31 Jan 2024 15:04), Max: 98.7 (31 Jan 2024 15:04)  HR: 50 (31 Jan 2024 15:42) (49 - 52)  BP: 145/51 (31 Jan 2024 15:42) (145/51 - 168/68)  BP(mean): 72 (31 Jan 2024 15:42) (72 - 72)  RR: 19 (31 Jan 2024 15:42) (18 - 19)  SpO2: 100% (31 Jan 2024 15:42) (97% - 100%)    Parameters below as of 31 Jan 2024 15:42  Patient On (Oxygen Delivery Method): room air        PHYSICAL EXAM:    GENERAL: NAD  HEENT:  NC/AT, EOMI, PERRLA, No scleral icterus, Moist mucous membranes  NECK: Supple, No JVD  CNS:  Alert & Oriented X3, Motor Strength 5/5 B/L upper and lower extremities; DTRs 2+ intact   LUNG: Normal Breath sounds, Clear to auscultation bilaterally, No rales, No rhonchi, No wheezing  HEART: RRR; No murmurs, No rubs  ABDOMEN: +BS, ST/ND/NT  GENITOURINARY: Voiding, Bladder not distended  EXTREMITIES:  2+ Peripheral Pulses, No clubbing, No cyanosis, +1 Bilateral  tibial edema  MUSCULOSKELTAL: Joints normal ROM, No TTP, No effusion  VAGINAL: deferred  SKIN: no rashes  RECTAL: deferred, not indicated  BREAST: deferred                          11.3   7.40  )-----------( 142      ( 31 Jan 2024 12:05 )             38.7     01-31    141  |  112<H>  |  21  ----------------------------<  131<H>  4.0   |  28  |  0.86    Ca    9.0      31 Jan 2024 12:05  Mg     2.2     01-31    TPro  6.6  /  Alb  3.2<L>  /  TBili  0.6  /  DBili  x   /  AST  21  /  ALT  30  /  AlkPhos  84  01-31    Vancomycin levels:   Cultures:     MEDICATIONS  (STANDING):    MEDICATIONS  (PRN):  acetaminophen     Tablet .. 650 milliGRAM(s) Oral every 6 hours PRN Mild Pain (1 - 3)  ondansetron Injectable 4 milliGRAM(s) IV Push every 6 hours PRN Nausea and/or Vomiting      all labs reviewed  all imaging reviewed    a/p:    1. Dyspnea:   r/o Acute Chf, diastolic dysfunction   Lasix 40mg IV x 1  BNP elevated  TTE per cardio records outpatient     2. Bradycardia:  decrease Lopressor to 12.5mg Bid    3. Htn:  c/w Losartan 100mg/day  c/w Lopressor   Add diuretic  · 78 yo F with PMHx of CHF, HFpEF, CVA, Behcet's syndrome, CAD w/stent, DVT on Eliquis, HTN, lumbago with saw pmd today Dr Davidson for 1 month of progressing SOB and LE swelling.   Patient stopped diuretic on her own a few mo ago due to frequent urination.  No fever/chills, no cold symptoms no coughing   No increased salt intake per son    PAST MEDICAL HISTORY:  Asthmatic bronchitis   Behcet's syndrome   CHF (congestive heart failure)   CVA (cerebral vascular accident)   DVT (deep venous thrombosis)   HLD (hyperlipidemia)   HTN (hypertension)   Lumbago   Nonintractable epilepsy without status epilepticus 2/23/2018  Scoliosis.     PAST SURGICAL HISTORY:  S/P hysterectomy.     FAMILY HISTORY:  2 sons have CAD    Social Hx:   no smoking, no Etoh        REVIEW OF SYSTEMS:    CONSTITUTIONAL: No weakness, No fevers or chills  ENT: No ear ache, No sorethroat  NECK: No pain, No stiffness  RESPIRATORY: No cough, No wheezing, No hemoptysis; No dyspnea  CARDIOVASCULAR: No chest pain, No palpitations  GASTROINTESTINAL: No abd pain, No nausea, No vomiting, No hematemesis, No diarrhea or constipation. No melena, No hematochezia.  GENITOURINARY: No dysuria, No  hematuria  NEUROLOGICAL: No diplopia, No paresthesia, No motor dysfunction  MUSCULOSKELETAL: No arthralgia, No myalgia  SKIN: No rashes, or lesions   PSYCH: no anxiety, no suicidal ideation    All other review of systems is negative unless indicated above    Vital Signs Last 24 Hrs  T(C): 37.1 (31 Jan 2024 15:04), Max: 37.1 (31 Jan 2024 15:04)  T(F): 98.7 (31 Jan 2024 15:04), Max: 98.7 (31 Jan 2024 15:04)  HR: 50 (31 Jan 2024 15:42) (49 - 52)  BP: 145/51 (31 Jan 2024 15:42) (145/51 - 168/68)  BP(mean): 72 (31 Jan 2024 15:42) (72 - 72)  RR: 19 (31 Jan 2024 15:42) (18 - 19)  SpO2: 100% (31 Jan 2024 15:42) (97% - 100%)    Parameters below as of 31 Jan 2024 15:42  Patient On (Oxygen Delivery Method): room air        PHYSICAL EXAM:    GENERAL: NAD  HEENT:  NC/AT, EOMI, PERRLA, No scleral icterus, Moist mucous membranes  NECK: Supple, No JVD  CNS:  Alert & Oriented X3, Motor Strength 5/5 B/L upper and lower extremities; DTRs 2+ intact   LUNG: Normal Breath sounds, Clear to auscultation bilaterally, No rales, No rhonchi, No wheezing  HEART: RRR; No murmurs, No rubs  ABDOMEN: +BS, ST/ND/NT  GENITOURINARY: Voiding, Bladder not distended  EXTREMITIES:  2+ Peripheral Pulses, No clubbing, No cyanosis, +1 Bilateral  tibial edema  MUSCULOSKELTAL: Joints normal ROM, No TTP, No effusion  VAGINAL: deferred  SKIN: no rashes  RECTAL: deferred, not indicated  BREAST: deferred                          11.3   7.40  )-----------( 142      ( 31 Jan 2024 12:05 )             38.7     01-31    141  |  112<H>  |  21  ----------------------------<  131<H>  4.0   |  28  |  0.86    Ca    9.0      31 Jan 2024 12:05  Mg     2.2     01-31    TPro  6.6  /  Alb  3.2<L>  /  TBili  0.6  /  DBili  x   /  AST  21  /  ALT  30  /  AlkPhos  84  01-31    Vancomycin levels:   Cultures:     MEDICATIONS  (STANDING):    MEDICATIONS  (PRN):  acetaminophen     Tablet .. 650 milliGRAM(s) Oral every 6 hours PRN Mild Pain (1 - 3)  ondansetron Injectable 4 milliGRAM(s) IV Push every 6 hours PRN Nausea and/or Vomiting      all labs reviewed  all imaging reviewed    a/p:    1. Dyspnea:   r/o Acute on chronic diastolic  Chf due to medication noncompliance   Lasix 40mg IV x 1  BNP elevated  TTE per cardio records outpatient     2. Bradycardia:  decrease Lopressor to 12.5mg Bid    3. Htn:  c/w Losartan 100mg/day  c/w Lopressor   Add diuretic     4. Behcet sdr:  c/w Prednisone 10mg/day · 80 yo F with PMHx of CHF, HFpEF, CVA, Behcet's syndrome, CAD w/stent, DVT on Eliquis, HTN, lumbago with saw pmd today Dr Davidson for 1 month of progressing SOB and LE swelling.   Patient stopped diuretic on her own a few mo ago due to frequent urination.  No fever/chills, no cold symptoms no coughing   No increased salt intake per son    PAST MEDICAL HISTORY:  Asthmatic bronchitis   Behcet's syndrome   CHF (congestive heart failure)   CVA (cerebral vascular accident)   DVT (deep venous thrombosis)   HLD (hyperlipidemia)   HTN (hypertension)   Lumbago   Nonintractable epilepsy without status epilepticus 2/23/2018  Scoliosis.     PAST SURGICAL HISTORY:  S/P hysterectomy.     FAMILY HISTORY:  2 sons have CAD    Social Hx:   no smoking, no Etoh        REVIEW OF SYSTEMS:    CONSTITUTIONAL: No weakness, No fevers or chills  ENT: No ear ache, No sorethroat  NECK: No pain, No stiffness  RESPIRATORY: No cough, No wheezing, No hemoptysis; No dyspnea  CARDIOVASCULAR: No chest pain, No palpitations  GASTROINTESTINAL: No abd pain, No nausea, No vomiting, No hematemesis, No diarrhea or constipation. No melena, No hematochezia.  GENITOURINARY: No dysuria, No  hematuria  NEUROLOGICAL: No diplopia, No paresthesia, No motor dysfunction  MUSCULOSKELETAL: No arthralgia, No myalgia  SKIN: No rashes, or lesions   PSYCH: no anxiety, no suicidal ideation    All other review of systems is negative unless indicated above    Vital Signs Last 24 Hrs  T(C): 37.1 (31 Jan 2024 15:04), Max: 37.1 (31 Jan 2024 15:04)  T(F): 98.7 (31 Jan 2024 15:04), Max: 98.7 (31 Jan 2024 15:04)  HR: 50 (31 Jan 2024 15:42) (49 - 52)  BP: 145/51 (31 Jan 2024 15:42) (145/51 - 168/68)  BP(mean): 72 (31 Jan 2024 15:42) (72 - 72)  RR: 19 (31 Jan 2024 15:42) (18 - 19)  SpO2: 100% (31 Jan 2024 15:42) (97% - 100%)    Parameters below as of 31 Jan 2024 15:42  Patient On (Oxygen Delivery Method): room air        PHYSICAL EXAM:    GENERAL: NAD  HEENT:  NC/AT, EOMI, PERRLA, No scleral icterus, Moist mucous membranes  NECK: Supple, No JVD  CNS:  Alert & Oriented X3, Motor Strength 5/5 B/L upper and lower extremities; DTRs 2+ intact   LUNG: Normal Breath sounds, Clear to auscultation bilaterally, No rales, No rhonchi, No wheezing  HEART: RRR; No murmurs, No rubs  ABDOMEN: +BS, ST/ND/NT  GENITOURINARY: Voiding, Bladder not distended  EXTREMITIES:  2+ Peripheral Pulses, No clubbing, No cyanosis, +1 Bilateral  tibial edema  MUSCULOSKELTAL: Joints normal ROM, No TTP, No effusion  VAGINAL: deferred  SKIN: no rashes  RECTAL: deferred, not indicated  BREAST: deferred                          11.3   7.40  )-----------( 142      ( 31 Jan 2024 12:05 )             38.7     01-31    141  |  112<H>  |  21  ----------------------------<  131<H>  4.0   |  28  |  0.86    Ca    9.0      31 Jan 2024 12:05  Mg     2.2     01-31    TPro  6.6  /  Alb  3.2<L>  /  TBili  0.6  /  DBili  x   /  AST  21  /  ALT  30  /  AlkPhos  84  01-31    Vancomycin levels:   Cultures:     MEDICATIONS  (STANDING):    MEDICATIONS  (PRN):  acetaminophen     Tablet .. 650 milliGRAM(s) Oral every 6 hours PRN Mild Pain (1 - 3)  ondansetron Injectable 4 milliGRAM(s) IV Push every 6 hours PRN Nausea and/or Vomiting      all labs reviewed  all imaging reviewed    a/p:    1. Dyspnea:   r/o Acute on chronic diastolic  Chf due to medication noncompliance   Lasix 40mg IV x 1  BNP elevated  TTE per cardio records outpatient     2. Bradycardia:  decrease Lopressor to 12.5mg Bid    CAD:  f/u Trops x 3  possible LHC in [place]; hold Apixaban     3. Htn:  c/w Losartan 100mg/day  c/w Lopressor   Add diuretic     4. Behcet sdr:  c/w Prednisone 10mg/day

## 2024-01-31 NOTE — ED ADULT NURSE NOTE - NSFALLUNIVINTERV_ED_ALL_ED
Bed/Stretcher in lowest position, wheels locked, appropriate side rails in place/Call bell, personal items and telephone in reach/Instruct patient to call for assistance before getting out of bed/chair/stretcher/Non-slip footwear applied when patient is off stretcher/[place] to call system/Physically safe environment - no spills, clutter or unnecessary equipment/Purposeful proactive rounding/Room/bathroom lighting operational, light cord in reach

## 2024-01-31 NOTE — PHARMACOTHERAPY INTERVENTION NOTE - COMMENTS
Medication reconciliation completed.  Reviewed Medication list and confirmed med allergies with patient's family at bedside; confirmed with Dr. First Medosmani.

## 2024-01-31 NOTE — ED PROVIDER NOTE - PHYSICAL EXAMINATION
Constitutional: NAD AOx3  Eyes: PERRL EOMI  Head: Normocephalic atraumatic  Mouth: MMM  Cardiac: bradycardic rate, normal  rhythm  Resp: Lungs CTAB  GI: Abd s/nd/nt  Neuro: CN2-12 grossly intact, FRAIRE x 4  Skin: No visible rashes   extrem: chronic le b/l edema pitting, no erythema

## 2024-02-01 ENCOUNTER — TRANSCRIPTION ENCOUNTER (OUTPATIENT)
Age: 79
End: 2024-02-01

## 2024-02-01 VITALS
RESPIRATION RATE: 18 BRPM | HEART RATE: 64 BPM | TEMPERATURE: 98 F | SYSTOLIC BLOOD PRESSURE: 93 MMHG | DIASTOLIC BLOOD PRESSURE: 78 MMHG | OXYGEN SATURATION: 97 %

## 2024-02-01 LAB — TROPONIN I, HIGH SENSITIVITY RESULT: 32.46 NG/L — SIGNIFICANT CHANGE UP

## 2024-02-01 PROCEDURE — 99239 HOSP IP/OBS DSCHRG MGMT >30: CPT

## 2024-02-01 RX ORDER — APIXABAN 2.5 MG/1
5 TABLET, FILM COATED ORAL EVERY 12 HOURS
Refills: 0 | Status: DISCONTINUED | OUTPATIENT
Start: 2024-02-01 | End: 2024-02-01

## 2024-02-01 RX ADMIN — LOSARTAN POTASSIUM 100 MILLIGRAM(S): 100 TABLET, FILM COATED ORAL at 10:55

## 2024-02-01 RX ADMIN — Medication 12.5 MILLIGRAM(S): at 10:55

## 2024-02-01 RX ADMIN — Medication 10 MILLIGRAM(S): at 10:54

## 2024-02-01 NOTE — CHART NOTE - NSCHARTNOTEFT_GEN_A_CORE
Dietitian Brief Note:   Consulted for: MST > 2       *Current Status: 2/1/24: RD consulted for MST >/=2. RD spoke w/ pt and family (son and grandson at bed-side); family interpreted for pt as she is Penelope speaking. Pt reports that she's eating well (consuming >75% of ENN), w/ stable weight, and NFPE reveals no muscle/fat wasting at this time. Does not currently meet criteria for Protein Calorie Malnutrition risk per MST. RD remains available for assessment per protocol or as needed.             Sonia Velasco, MS, RDN [phone]

## 2024-02-01 NOTE — CONSULT NOTE ADULT - ASSESSMENT
CAD, Unstable angina  Acute on chronic diastolic CHF  HTN  DVT, on anticoagulation  Behcet syndrome    Suggest:    Cardiac monitored bed  O2 supplement PRN  Cardiac enzymes negative  EKG is indeterminate because of LVH  Treat with aspirin   SC Heparin  Beta blockers if HR allows  ARBs  Statins  Nitrates PRN  Ischemia evaluation - Based on pt's symptoms, history, risk factors, exam, lab and EKG data I have advised pt have a cardiac catheterization and possible percutaneous coronary intervention. Procedure, its risks, alternatives, benefits and potential complications were discussed in detail. Risks include but not limited to bleeding, infection, allergy, renal failure requiring dialysis, stroke, vascular injury, pericardial tamponade, arrhythmias, MI and even death. Pt is agreeable and has consented for the procedure and the procedure is being scheduled.

## 2024-02-01 NOTE — DISCHARGE NOTE PROVIDER - CARE PROVIDER_API CALL
Christophe Lynne  Cardiovascular Disease  [address] [place] [address]  Phone: [phone]  Fax: [phone]  Established Patient  Follow Up Time: 1 week

## 2024-02-01 NOTE — DISCHARGE NOTE NURSING/CASE MANAGEMENT/SOCIAL WORK - PATIENT PORTAL LINK FT
You can access the FollowMyHealth Patient Portal offered by [facility] by registering at the following website: [email or web address]. By joining "[employer]’s FollowMyHealth portal, you will also be able to view your health information using other applications (apps) compatible with our system.

## 2024-02-01 NOTE — DISCHARGE NOTE PROVIDER - HOSPITAL COURSE
HPI: 80 yo F with PMHx of CHF, HFpEF, CVA, Behcet's syndrome, CAD w/stent, DVT on Eliquis, HTN, lumbago with saw pmd today Dr Davidson for 1 month of progressing SOB and LE swelling.   Patient stopped diuretic on her own a few mo ago due to frequent urination.  No fever/chills, no cold symptoms no coughing. TWI on EKG. Plan for cardiac cath     Hospital course (by problem):   #History of CAD  #Acute on chronic diastolic CHF  #HTN  #DVT, on anticoagulation  #Behcet syndrome    - s/p C 2/1: revealing patent stent; non obstructive disease  - Resume Eliquis tonight. Start ASA. Will also resume home diuretic (she self discontinued her Lasix 40 1 month ago). Will resume at Lasix 20 mg daily  - Plan above discussed with Cardiology. Will follow closely with Dr. Lynne upon discharge  - Discharge plan also discussed with family    Patient was discharged to: Home    Physical exam at the time of discharge:  LOS: 1d    VITALS:   T(C): 36.6 (02-01-24 @ 15:58), Max: 36.8 (01-31-24 @ 16:47)  HR: 64 (02-01-24 @ 15:58) (51 - 75)  BP: 93/78 (02-01-24 @ 15:58) (93/78 - 179/61)  RR: 18 (02-01-24 @ 15:58) (16 - 20)  SpO2: 97% (02-01-24 @ 15:58) (92% - 100%)    PHYSICAL EXAM:  GENERAL: NAD  HEAD:  Atraumatic, Normocephalic  EYES: EOMI, PERRLA, conjunctiva and sclera clear  ENMT: No tonsillar erythema, exudates, or enlargement; Moist mucous membranes  NECK: Supple, No JVD, Normal thyroid  HEART: Regular rate and rhythm; No murmurs, rubs, or gallops  RESPIRATORY: Unlabored respirations. CTA B/L, No W/R/R  ABDOMEN: Soft, Nontender, Nondistended; Bowel sounds present  NEUROLOGY: A&Ox3, nonfocal, moving all extremities  EXTREMITIES:  2+ Peripheral Pulses, mild bilateral LE edema   SKIN: warm, dry, normal color, no rash or abnormal lesions

## 2024-02-01 NOTE — PROGRESS NOTE ADULT - SUBJECTIVE AND OBJECTIVE BOX
Department of Cardiology                                                               Division of Interventional Cardiology                                                               [facility] /[address] [address]                                                                                 [phone]               HPI:  · 78 yo F with PMHx of CHF, HFpEF, CVA, Behcet's syndrome, CAD w/stent, DVT on Eliquis, HTN, lumbago with saw pmd today Dr Davidson for 1 month of progressing SOB and LE swelling.     -plan for cardiac cath for ischemic work up  -Consent obtained for cardiac catheterization w/ coronary angiogram and possible stent placement, with possible sedation and analgia. Pt is competent, has capacity, and understands risks and benefits of procedure. Risks and benefits discussed. Risk discussed included, but not limited to MI, stroke, mortality, major bleeding, arrythmia, or infection. All questions answered   -FRED bolus excluded 2/2 sx of SOB  -procedure discussed with patient; risks and benefits explained, questions answered  -consent obtained by attending IC    ASA class: III  Creatinine: 0.86  GFR: 69  Bleeding  Risk score: 2.6  Kristopher Score: 13      Vital Signs  Vital Signs Last 24 Hrs  T(C): 36.7 (01 Feb 2024 08:26), Max: 37.1 (31 Jan 2024 15:04)  T(F): 98.1 (01 Feb 2024 08:26), Max: 98.7 (31 Jan 2024 15:04)  HR: 52 (01 Feb 2024 08:26) (49 - 75)  BP: 111/45 (01 Feb 2024 08:26) (111/45 - 179/61)  BP(mean): 91 (31 Jan 2024 16:47) (72 - 91)  RR: 18 (01 Feb 2024 08:26) (18 - 20)  SpO2: 92% (01 Feb 2024 08:26) (92% - 100%)    Parameters below as of 01 Feb 2024 08:26  Patient On (Oxygen Delivery Method): room air          Labs:                        11.3   7.40  )-----------( 142      ( 31 Jan 2024 12:05 )             38.7     01-31    141  |  112<H>  |  21  ----------------------------<  131<H>  4.0   |  28  |  0.86    Ca    9.0      31 Jan 2024 12:05  Mg     2.2     01-31    TPro  6.6  /  Alb  3.2<L>  /  TBili  0.6  /  DBili  x   /  AST  21  /  ALT  30  /  AlkPhos  84  01-31    PT/INR - ( 31 Jan 2024 12:05 )   PT: 11.2 sec;   INR: 0.99 ratio         PTT - ( 31 Jan 2024 12:05 )  PTT:32.1 sec        MEDICATIONS  (STANDING):  atorvastatin 40 milliGRAM(s) Oral at bedtime  heparin   Injectable 5000 Unit(s) SubCutaneous every 12 hours  losartan 100 milliGRAM(s) Oral daily  metoprolol tartrate 12.5 milliGRAM(s) Oral two times a day  predniSONE   Tablet 10 milliGRAM(s) Oral daily      PHYSICAL EXAM  GENERAL: NAD, AAOx3  CHEST/LUNG: Clear to auscultation bilaterally; No wheeze  HEART: s1 s2 Regular rate and rhythm; No murmurs, rubs, or gallops  ABDOMEN: Soft, Nontender, Nondistended; Bowel sounds present X 4 quadrants   EXTREMITIES:  2+ Peripheral Pulses, No clubbing, cyanosis, or edema  Psych: normal affect and behavior, calm and cooperative   PROCEDURE SITE: ----- ,Site is without hematoma or bleeding. Sensation and CRISTELA intact. Distal pulses palpable 2+, capillary refill < 2 seconds. Patient denies pain, numbness, tingling, CP or SOB.      EKG    PROCEDURE RESULTS        s/p LHC :  Pt denies chest pain/SOB/palpitations post cath.    PLAN:  #CAD          Department of Cardiology                                                               Division of Interventional Cardiology                                                               [facility] /[address] [address]                                                                                 [phone]               HPI:  · 78 yo F with PMHx of CHF, HFpEF, CVA, Behcet's syndrome, CAD w/stent, DVT on Eliquis, HTN, lumbago with saw pmd today Dr Davidson for 1 month of progressing SOB and LE swelling.     -plan for cardiac cath for ischemic work up  -Consent obtained for cardiac catheterization w/ coronary angiogram and possible stent placement, with possible sedation and analgia. Pt is competent, has capacity, and understands risks and benefits of procedure. Risks and benefits discussed. Risk discussed included, but not limited to MI, stroke, mortality, major bleeding, arrythmia, or infection. All questions answered   -FRED bolus excluded 2/2 sx of SOB  -procedure discussed with patient; risks and benefits explained, questions answered  -consent obtained by attending IC    ASA class: III  Creatinine: 0.86  GFR: 69  Bleeding  Risk score: 2.6  Kristopher Score: 13      Vital Signs  Vital Signs Last 24 Hrs  T(C): 36.7 (01 Feb 2024 08:26), Max: 37.1 (31 Jan 2024 15:04)  T(F): 98.1 (01 Feb 2024 08:26), Max: 98.7 (31 Jan 2024 15:04)  HR: 52 (01 Feb 2024 08:26) (49 - 75)  BP: 111/45 (01 Feb 2024 08:26) (111/45 - 179/61)  BP(mean): 91 (31 Jan 2024 16:47) (72 - 91)  RR: 18 (01 Feb 2024 08:26) (18 - 20)  SpO2: 92% (01 Feb 2024 08:26) (92% - 100%)    Parameters below as of 01 Feb 2024 08:26  Patient On (Oxygen Delivery Method): room air          Labs:                        11.3   7.40  )-----------( 142      ( 31 Jan 2024 12:05 )             38.7     01-31    141  |  112<H>  |  21  ----------------------------<  131<H>  4.0   |  28  |  0.86    Ca    9.0      31 Jan 2024 12:05  Mg     2.2     01-31    TPro  6.6  /  Alb  3.2<L>  /  TBili  0.6  /  DBili  x   /  AST  21  /  ALT  30  /  AlkPhos  84  01-31    PT/INR - ( 31 Jan 2024 12:05 )   PT: 11.2 sec;   INR: 0.99 ratio         PTT - ( 31 Jan 2024 12:05 )  PTT:32.1 sec        MEDICATIONS  (STANDING):  atorvastatin 40 milliGRAM(s) Oral at bedtime  heparin   Injectable 5000 Unit(s) SubCutaneous every 12 hours  losartan 100 milliGRAM(s) Oral daily  metoprolol tartrate 12.5 milliGRAM(s) Oral two times a day  predniSONE   Tablet 10 milliGRAM(s) Oral daily      PHYSICAL EXAM  GENERAL: NAD, AAOx3  CHEST/LUNG: Clear to auscultation bilaterally; No wheeze  HEART: s1 s2 Regular rate and rhythm; No murmurs, rubs, or gallops  ABDOMEN: Soft, Nontender, Nondistended; Bowel sounds present X 4 quadrants   EXTREMITIES:  2+ Peripheral Pulses, No clubbing, cyanosis, or edema  Psych: normal affect and behavior, calm and cooperative   PROCEDURE SITE: RFA accessed, closed via perclose. Site is without hematoma or bleeding. Sensation and CRISTELA intact. Distal pulses palpable 2+, capillary refill < 2 seconds. Patient denies pain, numbness, tingling, CP or SOB.          PROCEDURE RESULTS  full report to follow       s/p LHC : revealing patent stent; non obs disease   Pt denies chest pain/SOB/palpitations post cath.      #CAD  -Return to inpatient room/ admit to   -VS, diet, activity as per post cath orders  - Encourage PO fluids  -Continue current medications  -- can resume Eliquis tonight   -hold metformin for 48 hrs post cath  -f/u EKG in AM, AM Labs  -Post cath instructions reviewed, post sedation instructions reviewed; patient verbalizes and understands instructions  -Discussed therapeutic lifestyle changes to reduce risk factors such as following a cardiac diet, weight loss, maintaining a healthy weight, exercise, smoking cessation, medication compliance, and regular follow-up  with PCP/Cardioloigst  -Plan of care discussed with patient, RN and Dr. Lira  -rest of care per primary team and general cardiology

## 2024-02-01 NOTE — PACU DISCHARGE NOTE - COMMENTS
Patient s/p LHC via RFA. Mynx closure device to RFA. No s/s of bleeding or hematoma. RLE warm and mobile. VS Stable. Patient denies pain. Report given to Lizbeth Rocha on 3N and confirmed capture for remote monitoring. Patient pending transport to  at this time

## 2024-02-01 NOTE — PROGRESS NOTE ADULT - SUBJECTIVE AND OBJECTIVE BOX
Non obstructive CAD  Patent stent in the LCx  Hyperdynamic LV function. Suggestion of apical hypertrophy and cavity obliteration    Manage with medical therapy  Resume anticoagulation for DVT  D/C SC Heparin  Will prefer Plavix over Aspirin  Can add low dose amlodipine for small vessel CAD, hypertrophic heart and BP control

## 2024-02-01 NOTE — DISCHARGE NOTE PROVIDER - NSDCCPCAREPLAN_GEN_ALL_CORE_FT
PRINCIPAL DISCHARGE DIAGNOSIS  Diagnosis: Shortness of breath  Assessment and Plan of Treatment: You had a left heart catherization done in the hospital which did not show any obstructive disease.   You may resume your home Eliquis tonight at 10 pm.   Please continue your home medications as prescribed. In addition, please take Aspirin 81 mg once daily and Lasix 20 mg once daily.   Please follow up with your Cardiologist within 1 week of discharge to continue management.

## 2024-02-01 NOTE — CONSULT NOTE ADULT - SUBJECTIVE AND OBJECTIVE BOX
79y old Female who has h/o CAD and previous LCx stent presents with a chief complaint of chest pain. Also has Bilateral lower ext edema and shortness of breath. Recently increase salty diet and snacks.      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     PAST SURGICAL HISTORY:  S/P hysterectomy.       PREVIOUS CARDIAC WORKUP:      < from: TTE Echo Complete w/ Contrast w/ Doppler (06.27.23 @ 09:38) >   Mild concentric left ventricular hypertrophy is present.   The left ventricular walls appear hyperechoic.   Definity was used to better visualize the endocardial border.   The right ventricular free wall appears hyperechoic and mildly   hypertrophic.      < from: Cardiac Cath Lab - Adult (07.16.18 @ 11:39) >   One Vessel coronary artery disease (LCx) .   Normal LV systolic function. Estimated LV ejection fraction is 60 %.   No mitral valve stenosis.   JOSE ANTONIO PCI of prox LCx      ALLERGIES:    penicillin (Rash)       MEDICATIONS  (STANDING):  apixaban 5 milliGRAM(s) Oral every 12 hours  atorvastatin 40 milliGRAM(s) Oral at bedtime  heparin   Injectable 5000 Unit(s) SubCutaneous every 12 hours  losartan 100 milliGRAM(s) Oral daily  metoprolol tartrate 12.5 milliGRAM(s) Oral two times a day  predniSONE   Tablet 10 milliGRAM(s) Oral daily    MEDICATIONS  (PRN):  acetaminophen     Tablet .. 650 milliGRAM(s) Oral every 6 hours PRN Mild Pain (1 - 3)  ondansetron Injectable 4 milliGRAM(s) IV Push every 6 hours PRN Nausea and/or Vomiting  pantoprazole    Tablet 40 milliGRAM(s) Oral before breakfast PRN for heartburn      FAMILY HISTORY:   Positive for early onset CAD          SOCIAL HISTORY:  Nonsmoker. No ETOH abuse. No illicit drugs.     ROS:     Detailed ten system ROS was performed and was negative except for history as eluded to above.    no fever  no chills  no nausea  no vomiting  no diarrhea  no constipation  no melena  no hematochezia  no chest pain  - resolved   no palpitations  no sob at rest  + dyspnea on exertion  no cough  no wheezing  no anorexia  no headache  no dizziness  no syncope  no weakness  no myalgia  no dysuria  no polyuria  no hematuria         Vital Signs Last 24 Hrs  T(C): 36.7 (01 Feb 2024 08:26), Max: 37.1 (31 Jan 2024 15:04)  T(F): 98.1 (01 Feb 2024 08:26), Max: 98.7 (31 Jan 2024 15:04)  HR: 52 (01 Feb 2024 08:26) (49 - 75)  BP: 111/45 (01 Feb 2024 08:26) (111/45 - 179/61)  BP(mean): 91 (31 Jan 2024 16:47) (72 - 91)  RR: 18 (01 Feb 2024 08:26) (18 - 20)  SpO2: 92% (01 Feb 2024 08:26) (92% - 100%)    Parameters below as of 01 Feb 2024 08:26  Patient On (Oxygen Delivery Method): room air      PHYSICAL EXAM:    General:                Comfortable, AAO X 3, in no distress.  HEENT:                  Unremarkable.   Neck:                     Supple, no adenopathy, no thyromegaly, no JVD, no bruit.  Chest:                    Clear, B/L symmetric air entry, no tachypnea  Heart:                     S1, S2 normal, no gallop, no murmur.  Abdomen:              Soft, non-tender, bowel sounds active. No palpable masses.  Extremities:           no cyanosis, no edema.   Neurologic:            Grossly nonfocal.       TELEMETRY:  sinus rhythm, sinus alisia    ECG:   Sinus alisia, LVH.     LABS:                        11.3   7.40  )-----------( 142      ( 31 Jan 2024 12:05 )             38.7     01-31    141  |  112<H>  |  21  ----------------------------<  131<H>  4.0   |  28  |  0.86    Ca    9.0      31 Jan 2024 12:05  Mg     2.2     01-31    TPro  6.6  /  Alb  3.2<L>  /  TBili  0.6  /  DBili  x   /  AST  21  /  ALT  30  /  AlkPhos  84  01-31      02-01 @ 04:22  Trop-I  32.46    01-31 @ 19:44  Trop-I  25.42    01-31 @ 12:05  Trop-I  28.43      Pro BNP   01-31 @ 12:05  883      PT/INR - ( 31 Jan 2024 12:05 )   PT: 11.2 sec;   INR: 0.99 ratio    PTT - ( 31 Jan 2024 12:05 )  PTT:32.1 sec      RADIOLOGY & ADDITIONAL STUDIES:    < from: Xray Chest 1 View- PORTABLE-Urgent (01.31.24 @ 13:05) >  INTERPRETATION:  AP chest on January 31, 2024 at 12:50 PM. Patient has chest pain.    Heart is quite enlarged.  There is persistent bandlike atelectasis in the right lower lateral lung.  Chest is surprisingly similar to June 26, 2023.  IMPRESSION: Stable findings as above.

## 2024-02-01 NOTE — DISCHARGE NOTE PROVIDER - NSDCMRMEDTOKEN_GEN_ALL_CORE_FT
aspirin 81 mg oral delayed release tablet: 1 tab(s) orally  atorvastatin 40 mg oral tablet: 1 tab(s) orally once a day (at bedtime)  cyclobenzaprine 5 mg oral tablet: 1 tab(s) orally once a day as needed for  muscle spasm  Eliquis 2.5 mg oral tablet: 1 tab(s) orally 2 times a day RESUME ELIQUIS TONIGHT AT 10 PM  Lasix 20 mg oral tablet: 1 tab(s) orally once a day  losartan 100 mg oral tablet: 1 tab(s) orally once a day (in the morning)  metoprolol tartrate 50 mg oral tablet: 0.25 tab(s) orally 2 times a day  pantoprazole 40 mg oral delayed release tablet: 1 tab(s) orally once a day as needed for  heartburn  predniSONE 10 mg oral tablet: 1 tab(s) orally once a day (in the morning)

## 2024-02-02 RX ORDER — FUROSEMIDE 40 MG
1 TABLET ORAL
Qty: 30 | Refills: 0
Start: 2024-02-02 | End: 2024-03-02

## 2024-02-02 RX ORDER — ASPIRIN/CALCIUM CARB/MAGNESIUM 324 MG
1 TABLET ORAL
Qty: 30 | Refills: 0 | DISCHARGE
Start: 2024-02-02 | End: 2024-03-02

## 2024-02-02 RX ORDER — ASPIRIN/CALCIUM CARB/MAGNESIUM 324 MG
1 TABLET ORAL
Qty: 30 | Refills: 0
Start: 2024-02-02 | End: 2024-03-02

## 2024-02-14 ENCOUNTER — APPOINTMENT (OUTPATIENT)
Dept: HOME HEALTH SERVICES | Facility: HOME HEALTH | Age: 79
End: 2024-02-14
Payer: MEDICARE

## 2024-02-14 VITALS
TEMPERATURE: 98.5 F | HEART RATE: 55 BPM | OXYGEN SATURATION: 99 % | RESPIRATION RATE: 15 BRPM | HEIGHT: 62 IN | DIASTOLIC BLOOD PRESSURE: 90 MMHG | SYSTOLIC BLOOD PRESSURE: 142 MMHG

## 2024-02-14 DIAGNOSIS — I20.9 ANGINA PECTORIS, UNSPECIFIED: ICD-10-CM

## 2024-02-14 DIAGNOSIS — M62.838 OTHER MUSCLE SPASM: ICD-10-CM

## 2024-02-14 DIAGNOSIS — I25.10 ATHEROSCLEROTIC HEART DISEASE OF NATIVE CORONARY ARTERY W/OUT ANGINA PECTORIS: ICD-10-CM

## 2024-02-14 DIAGNOSIS — R06.09 OTHER FORMS OF DYSPNEA: ICD-10-CM

## 2024-02-14 DIAGNOSIS — I82.409 ACUTE EMBOLISM AND THROMBOSIS OF UNSPECIFIED DEEP VEINS OF UNSPECIFIED LOWER EXTREMITY: ICD-10-CM

## 2024-02-14 DIAGNOSIS — I10 ESSENTIAL (PRIMARY) HYPERTENSION: ICD-10-CM

## 2024-02-14 DIAGNOSIS — R53.83 OTHER FATIGUE: ICD-10-CM

## 2024-02-14 DIAGNOSIS — M35.2 BEHCET'S DISEASE: ICD-10-CM

## 2024-02-14 DIAGNOSIS — I11.0 HYPERTENSIVE HEART DISEASE WITH HEART FAILURE: ICD-10-CM

## 2024-02-14 PROCEDURE — G0506: CPT

## 2024-02-14 PROCEDURE — 99345 HOME/RES VST NEW HIGH MDM 75: CPT | Mod: 25

## 2024-03-05 ENCOUNTER — INPATIENT (INPATIENT)
Facility: HOSPITAL | Age: 79
LOS: 1 days | Discharge: HOME | DRG: 872 | End: 2024-03-07
Attending: HOSPITALIST | Admitting: INTERNAL MEDICINE
Payer: MEDICARE

## 2024-03-05 VITALS
HEIGHT: 65 IN | OXYGEN SATURATION: 98 % | WEIGHT: 175.05 LBS | DIASTOLIC BLOOD PRESSURE: 81 MMHG | SYSTOLIC BLOOD PRESSURE: 147 MMHG | TEMPERATURE: 100 F | HEART RATE: 132 BPM | RESPIRATION RATE: 18 BRPM

## 2024-03-05 DIAGNOSIS — J10.1 INFLUENZA DUE TO OTHER IDENTIFIED INFLUENZA VIRUS WITH OTHER RESPIRATORY MANIFESTATIONS: ICD-10-CM

## 2024-03-05 DIAGNOSIS — Z90.710 ACQUIRED ABSENCE OF BOTH CERVIX AND UTERUS: Chronic | ICD-10-CM

## 2024-03-05 LAB
ALBUMIN SERPL ELPH-MCNC: 3.4 G/DL — SIGNIFICANT CHANGE UP (ref 3.3–5)
ALP SERPL-CCNC: 89 U/L — SIGNIFICANT CHANGE UP (ref 40–120)
ALT FLD-CCNC: 13 U/L — SIGNIFICANT CHANGE UP (ref 12–78)
ANION GAP SERPL CALC-SCNC: 4 MMOL/L — LOW (ref 5–17)
ANISOCYTOSIS BLD QL: SLIGHT — SIGNIFICANT CHANGE UP
APPEARANCE UR: CLEAR — SIGNIFICANT CHANGE UP
APTT BLD: 32.6 SEC — SIGNIFICANT CHANGE UP (ref 24.5–35.6)
AST SERPL-CCNC: 14 U/L — LOW (ref 15–37)
BACTERIA # UR AUTO: ABNORMAL /HPF
BASOPHILS # BLD AUTO: 0.02 K/UL — SIGNIFICANT CHANGE UP (ref 0–0.2)
BASOPHILS NFR BLD AUTO: 0.2 % — SIGNIFICANT CHANGE UP (ref 0–2)
BILIRUB SERPL-MCNC: 0.7 MG/DL — SIGNIFICANT CHANGE UP (ref 0.2–1.2)
BILIRUB UR-MCNC: NEGATIVE — SIGNIFICANT CHANGE UP
BUN SERPL-MCNC: 17 MG/DL — SIGNIFICANT CHANGE UP (ref 7–23)
CALCIUM SERPL-MCNC: 9.3 MG/DL — SIGNIFICANT CHANGE UP (ref 8.5–10.1)
CAST: 2 /LPF — SIGNIFICANT CHANGE UP (ref 0–4)
CHLORIDE SERPL-SCNC: 109 MMOL/L — HIGH (ref 96–108)
CO2 SERPL-SCNC: 27 MMOL/L — SIGNIFICANT CHANGE UP (ref 22–31)
COLOR SPEC: YELLOW — SIGNIFICANT CHANGE UP
CREAT SERPL-MCNC: 0.87 MG/DL — SIGNIFICANT CHANGE UP (ref 0.5–1.3)
DIFF PNL FLD: NEGATIVE — SIGNIFICANT CHANGE UP
EGFR: 68 ML/MIN/1.73M2 — SIGNIFICANT CHANGE UP
ELLIPTOCYTES BLD QL SMEAR: SLIGHT — SIGNIFICANT CHANGE UP
EOSINOPHIL # BLD AUTO: 0.23 K/UL — SIGNIFICANT CHANGE UP (ref 0–0.5)
EOSINOPHIL NFR BLD AUTO: 2.5 % — SIGNIFICANT CHANGE UP (ref 0–6)
FLUAV AG NPH QL: DETECTED
FLUBV AG NPH QL: SIGNIFICANT CHANGE UP
GLUCOSE SERPL-MCNC: 102 MG/DL — HIGH (ref 70–99)
GLUCOSE UR QL: NEGATIVE MG/DL — SIGNIFICANT CHANGE UP
HCT VFR BLD CALC: 39 % — SIGNIFICANT CHANGE UP (ref 34.5–45)
HGB BLD-MCNC: 11.4 G/DL — LOW (ref 11.5–15.5)
HYPOCHROMIA BLD QL: SIGNIFICANT CHANGE UP
IMM GRANULOCYTES NFR BLD AUTO: 0.1 % — SIGNIFICANT CHANGE UP (ref 0–0.9)
INR BLD: 1.05 RATIO — SIGNIFICANT CHANGE UP (ref 0.85–1.18)
KETONES UR-MCNC: NEGATIVE MG/DL — SIGNIFICANT CHANGE UP
LACTATE SERPL-SCNC: 1.4 MMOL/L — SIGNIFICANT CHANGE UP (ref 0.7–2)
LEUKOCYTE ESTERASE UR-ACNC: ABNORMAL
LYMPHOCYTES # BLD AUTO: 2.21 K/UL — SIGNIFICANT CHANGE UP (ref 1–3.3)
LYMPHOCYTES # BLD AUTO: 24.4 % — SIGNIFICANT CHANGE UP (ref 13–44)
MANUAL SMEAR VERIFICATION: SIGNIFICANT CHANGE UP
MCHC RBC-ENTMCNC: 19.7 PG — LOW (ref 27–34)
MCHC RBC-ENTMCNC: 29.2 GM/DL — LOW (ref 32–36)
MCV RBC AUTO: 67.4 FL — LOW (ref 80–100)
MICROCYTES BLD QL: SIGNIFICANT CHANGE UP
MONOCYTES # BLD AUTO: 0.55 K/UL — SIGNIFICANT CHANGE UP (ref 0–0.9)
MONOCYTES NFR BLD AUTO: 6.1 % — SIGNIFICANT CHANGE UP (ref 2–14)
NEUTROPHILS # BLD AUTO: 6.03 K/UL — SIGNIFICANT CHANGE UP (ref 1.8–7.4)
NEUTROPHILS NFR BLD AUTO: 66.7 % — SIGNIFICANT CHANGE UP (ref 43–77)
NITRITE UR-MCNC: NEGATIVE — SIGNIFICANT CHANGE UP
NT-PROBNP SERPL-SCNC: 629 PG/ML — HIGH (ref 0–450)
OVALOCYTES BLD QL SMEAR: SLIGHT — SIGNIFICANT CHANGE UP
PH UR: 7.5 — SIGNIFICANT CHANGE UP (ref 5–8)
PLAT MORPH BLD: NORMAL — SIGNIFICANT CHANGE UP
PLATELET # BLD AUTO: 226 K/UL — SIGNIFICANT CHANGE UP (ref 150–400)
POTASSIUM SERPL-MCNC: 4.1 MMOL/L — SIGNIFICANT CHANGE UP (ref 3.5–5.3)
POTASSIUM SERPL-SCNC: 4.1 MMOL/L — SIGNIFICANT CHANGE UP (ref 3.5–5.3)
PROT SERPL-MCNC: 7 GM/DL — SIGNIFICANT CHANGE UP (ref 6–8.3)
PROT UR-MCNC: NEGATIVE MG/DL — SIGNIFICANT CHANGE UP
PROTHROM AB SERPL-ACNC: 11.9 SEC — SIGNIFICANT CHANGE UP (ref 9.5–13)
RBC # BLD: 5.79 M/UL — HIGH (ref 3.8–5.2)
RBC # FLD: 16.1 % — HIGH (ref 10.3–14.5)
RBC BLD AUTO: ABNORMAL
RBC CASTS # UR COMP ASSIST: 3 /HPF — SIGNIFICANT CHANGE UP (ref 0–4)
RSV RNA NPH QL NAA+NON-PROBE: SIGNIFICANT CHANGE UP
SARS-COV-2 RNA SPEC QL NAA+PROBE: SIGNIFICANT CHANGE UP
SODIUM SERPL-SCNC: 140 MMOL/L — SIGNIFICANT CHANGE UP (ref 135–145)
SP GR SPEC: 1.02 — SIGNIFICANT CHANGE UP (ref 1–1.03)
SQUAMOUS # UR AUTO: 10 /HPF — HIGH (ref 0–5)
STOMATOCYTES BLD QL SMEAR: SLIGHT — SIGNIFICANT CHANGE UP
TROPONIN I, HIGH SENSITIVITY RESULT: 28.75 NG/L — SIGNIFICANT CHANGE UP
UROBILINOGEN FLD QL: 0.2 MG/DL — SIGNIFICANT CHANGE UP (ref 0.2–1)
WBC # BLD: 9.05 K/UL — SIGNIFICANT CHANGE UP (ref 3.8–10.5)
WBC # FLD AUTO: 9.05 K/UL — SIGNIFICANT CHANGE UP (ref 3.8–10.5)
WBC UR QL: 13 /HPF — HIGH (ref 0–5)

## 2024-03-05 PROCEDURE — 97116 GAIT TRAINING THERAPY: CPT | Mod: GP

## 2024-03-05 PROCEDURE — 93010 ELECTROCARDIOGRAM REPORT: CPT | Mod: 76

## 2024-03-05 PROCEDURE — 97163 PT EVAL HIGH COMPLEX 45 MIN: CPT | Mod: GP

## 2024-03-05 PROCEDURE — 85610 PROTHROMBIN TIME: CPT

## 2024-03-05 PROCEDURE — 85027 COMPLETE CBC AUTOMATED: CPT

## 2024-03-05 PROCEDURE — 94760 N-INVAS EAR/PLS OXIMETRY 1: CPT

## 2024-03-05 PROCEDURE — 71045 X-RAY EXAM CHEST 1 VIEW: CPT | Mod: 26

## 2024-03-05 PROCEDURE — 85025 COMPLETE CBC W/AUTO DIFF WBC: CPT

## 2024-03-05 PROCEDURE — 94640 AIRWAY INHALATION TREATMENT: CPT

## 2024-03-05 PROCEDURE — 36600 WITHDRAWAL OF ARTERIAL BLOOD: CPT

## 2024-03-05 PROCEDURE — 36415 COLL VENOUS BLD VENIPUNCTURE: CPT

## 2024-03-05 PROCEDURE — 85730 THROMBOPLASTIN TIME PARTIAL: CPT

## 2024-03-05 PROCEDURE — 80053 COMPREHEN METABOLIC PANEL: CPT

## 2024-03-05 PROCEDURE — 80048 BASIC METABOLIC PNL TOTAL CA: CPT

## 2024-03-05 PROCEDURE — 82803 BLOOD GASES ANY COMBINATION: CPT

## 2024-03-05 PROCEDURE — 99291 CRITICAL CARE FIRST HOUR: CPT

## 2024-03-05 PROCEDURE — 71275 CT ANGIOGRAPHY CHEST: CPT | Mod: 26,MC

## 2024-03-05 RX ORDER — MEROPENEM 1 G/30ML
1000 INJECTION INTRAVENOUS ONCE
Refills: 0 | Status: COMPLETED | OUTPATIENT
Start: 2024-03-05 | End: 2024-03-05

## 2024-03-05 RX ORDER — METOPROLOL TARTRATE 50 MG
1 TABLET ORAL
Refills: 0 | DISCHARGE

## 2024-03-05 RX ORDER — ACETAMINOPHEN 500 MG
1000 TABLET ORAL ONCE
Refills: 0 | Status: COMPLETED | OUTPATIENT
Start: 2024-03-05 | End: 2024-03-05

## 2024-03-05 RX ORDER — SODIUM CHLORIDE 9 MG/ML
1000 INJECTION INTRAMUSCULAR; INTRAVENOUS; SUBCUTANEOUS ONCE
Refills: 0 | Status: COMPLETED | OUTPATIENT
Start: 2024-03-05 | End: 2024-03-05

## 2024-03-05 RX ORDER — CEFTRIAXONE 500 MG/1
1000 INJECTION, POWDER, FOR SOLUTION INTRAMUSCULAR; INTRAVENOUS ONCE
Refills: 0 | Status: DISCONTINUED | OUTPATIENT
Start: 2024-03-05 | End: 2024-03-05

## 2024-03-05 RX ORDER — APIXABAN 2.5 MG/1
1 TABLET, FILM COATED ORAL
Refills: 0 | DISCHARGE

## 2024-03-05 RX ORDER — PANTOPRAZOLE SODIUM 20 MG/1
1 TABLET, DELAYED RELEASE ORAL
Refills: 0 | DISCHARGE

## 2024-03-05 RX ORDER — DIPHENHYDRAMINE HCL 50 MG
50 CAPSULE ORAL ONCE
Refills: 0 | Status: COMPLETED | OUTPATIENT
Start: 2024-03-05 | End: 2024-03-05

## 2024-03-05 RX ORDER — LOSARTAN POTASSIUM 100 MG/1
1 TABLET, FILM COATED ORAL
Refills: 0 | DISCHARGE

## 2024-03-05 RX ORDER — MEROPENEM 1 G/30ML
1000 INJECTION INTRAVENOUS ONCE
Refills: 0 | Status: DISCONTINUED | OUTPATIENT
Start: 2024-03-05 | End: 2024-03-05

## 2024-03-05 RX ORDER — CEFTRIAXONE 500 MG/1
1000 INJECTION, POWDER, FOR SOLUTION INTRAMUSCULAR; INTRAVENOUS ONCE
Refills: 0 | Status: COMPLETED | OUTPATIENT
Start: 2024-03-05 | End: 2024-03-05

## 2024-03-05 RX ORDER — METOPROLOL TARTRATE 50 MG
0.25 TABLET ORAL
Refills: 0 | DISCHARGE

## 2024-03-05 RX ORDER — CYCLOBENZAPRINE HYDROCHLORIDE 10 MG/1
1 TABLET, FILM COATED ORAL
Refills: 0 | DISCHARGE

## 2024-03-05 RX ORDER — IPRATROPIUM/ALBUTEROL SULFATE 18-103MCG
3 AEROSOL WITH ADAPTER (GRAM) INHALATION ONCE
Refills: 0 | Status: COMPLETED | OUTPATIENT
Start: 2024-03-05 | End: 2024-03-05

## 2024-03-05 RX ADMIN — Medication 3 MILLILITER(S): at 19:22

## 2024-03-05 RX ADMIN — MEROPENEM 1000 MILLIGRAM(S): 1 INJECTION INTRAVENOUS at 18:53

## 2024-03-05 RX ADMIN — Medication 50 MILLIGRAM(S): at 19:19

## 2024-03-05 RX ADMIN — Medication 75 MILLIGRAM(S): at 22:49

## 2024-03-05 RX ADMIN — Medication 400 MILLIGRAM(S): at 16:57

## 2024-03-05 RX ADMIN — SODIUM CHLORIDE 1000 MILLILITER(S): 9 INJECTION INTRAMUSCULAR; INTRAVENOUS; SUBCUTANEOUS at 16:55

## 2024-03-05 RX ADMIN — Medication 125 MILLIGRAM(S): at 19:19

## 2024-03-05 RX ADMIN — SODIUM CHLORIDE 1000 MILLILITER(S): 9 INJECTION INTRAMUSCULAR; INTRAVENOUS; SUBCUTANEOUS at 18:00

## 2024-03-05 NOTE — ED PROVIDER NOTE - CLINICAL SUMMARY MEDICAL DECISION MAKING FREE TEXT BOX
Plan: EKG, chest XR, labs, sepsis workup including IVF 1 L insetting of CHF, IV abx, flu/COVID swab, UA, cardiac monitor, likely admit. Plan: EKG, chest XR, labs, sepsis workup including IVF 1 L insetting of CHF, IV abx, flu/COVID swab, UA, cardiac monitor, likely admit.  CXR no acute infiltrate. Pt given meropenem in setting of PCN allergy and pt declining ceftriaxone with worsening wheezing- given solumedrol, benadryl and duoneb which improved symptoms. CTA negative for PE. Tamiflu prescribed. Admitted to Dr. Carmen hospitalist attending for influenza with symptomatic tachycardia.

## 2024-03-05 NOTE — ED ADULT NURSE NOTE - NSFALLHARMRISKINTERV_ED_ALL_ED

## 2024-03-05 NOTE — ED PROVIDER NOTE - PHYSICAL EXAMINATION
Constitutional: well appearing, NAD AAOx3  Eyes: EOMI, PERRL  Head: Normocephalic atraumatic  Mouth: no airway obstruction, posterior oropharynx clear without erythema or exudate  Neck: supple  Cardiac: tachycardic and rhythm, no MRG  Resp: Crackles right base. Expiratory wheezing b/l.   GI: Abd s/nt/nd  Neuro: CN2-12 intact, strength 5/5x4, sensation grossly intact  Skin: Posterior right calf with 2cm wound with small amount of purulent drainage. No fluctuance or induration.

## 2024-03-05 NOTE — ED ADULT NURSE REASSESSMENT NOTE - NS ED NURSE REASSESS COMMENT FT1
Pt c/o shortness of breath and wheezing s/p approx 20 mins after IV ABX of Merren. Dr. Avendano aware and at bedside. Pt is tachypneic and tachycardic. Awaiting further orders from MD.

## 2024-03-05 NOTE — ED STATDOCS - PROGRESS NOTE DETAILS
Sunita Rose for attending Dr. Rocha   51-year-old male presents the emergency department with right first toe pain after a door fell on his toe no other injuries exam with tenderness to palpation to the right first toe will x-ray rule out fracture no other signs of traumatic injury. Pt will be sent to Main ED for further work up and evaluation. Sunita Rose for attending Dr. Rocha   79-year-old female presents to the emergency department for fever cough wheezing here patient is tachycardic to the 130s oxygen is 92% on room air will send to main for further workup and evaluation.

## 2024-03-05 NOTE — ED ADULT NURSE NOTE - OBJECTIVE STATEMENT
Pt is a 79yr old female, A&OX3, arrives with daughter, primarily Romansh speaking but requesting daughter to translate, c/o fever/chills, cough, and SOB x2 days. HX of CHF, CAD with 1 cardiac stent, HTN, and HDL. Pt noted to be slightly tachypneic, orally febrile, and tachycardic to the 130's. Dr. Avendano aware and at bedside. Denies chest pain, HA, dizziness, abd. pain, N/V, and urinary symptoms. Labs drawn and sent as per order. Fluids infusing as per order. EKG completed. Placed on cardiac monitor.

## 2024-03-05 NOTE — ED PROVIDER NOTE - OBJECTIVE STATEMENT
80 y/o female with a PMHx of asthmatic bronchitis, Behcet's syndrome, CHF, CVA, DVT, HLD, HTN, lumbago, nonintractable epilepsy, scoliosis presents to the ED c/o cough and wheezing x2 days. No other complaints at this time.

## 2024-03-05 NOTE — ED ADULT NURSE REASSESSMENT NOTE - NS ED NURSE REASSESS COMMENT FT1
Pt states she feels much better and has relief from medications given as per MD order. Pt no longer tachypneic, airway patent. SpO2 at 98% on RA. Offers no complaints. Pt noted to intermittently be tachycardic. Dr. Avendano aware. No further orders at this time. In NAD.

## 2024-03-05 NOTE — ED ADULT TRIAGE NOTE - CHIEF COMPLAINT QUOTE
Pt presents to ED from home w/ cough, chills, congestion since yesterday night. Has not checked temperature but "feels warm". Denies sick contacts. Daughter also notes pt has a boil on her right leg and left arm. Pt took tylenol this AM. PMH HTN.

## 2024-03-05 NOTE — ED STATDOCS - INTERPRETATION SERVICES DECLINED
HISTORY OF PRESENT ILLNESS  Sadie Tello is a 60 y.o. female    HPI  C/o started 3 days ago with abdominal pain, associated with nausea/vomiting and diarrhea, no fever or chills, her vomiting and diarrhea has resolved since yesterday, still having nausea and poor po intake. No headache, no s/t, no cough  She has not been taking her pepcid recently  Pt was in with her son today    Review of Systems   Constitutional:  Negative for chills and fever. HENT:  Negative for sore throat. Respiratory:  Negative for cough. Gastrointestinal:  Negative for blood in stool. Physical Exam  Vitals reviewed. Constitutional:       General: She is not in acute distress. Appearance: She is not diaphoretic. Cardiovascular:      Rate and Rhythm: Normal rate and regular rhythm. Pulmonary:      Effort: Pulmonary effort is normal.      Breath sounds: Normal breath sounds. Abdominal:      General: Bowel sounds are normal.      Palpations: Abdomen is soft. There is no hepatomegaly. Tenderness: There is abdominal tenderness (mild) in the epigastric area. There is no guarding or rebound. ASSESSMENT and PLAN    1. Acute gastroenteritis, most likely viral  -     ondansetron (ZOFRAN-ODT) 8 MG TBDP disintegrating tablet; Place 1 tablet under the tongue every 8 hours as needed for Nausea or Vomiting, Disp-20 tablet, R-0Normal  - discussed bland diet today with pt and her son  - advised pt to take her pepcid 20 mg po BID for her abdominal pain, she does have that from previous Rx  2. Colon cancer screening  -     External Referral To Gastroenterology         Return in about 1 month (around 4/7/2023) for physical next visit. Patient/Caregiver requests family/friend to interpret.

## 2024-03-05 NOTE — ED STATDOCS - NS_ ATTENDINGSCRIBEDETAILS _ED_A_ED_FT
I, Allen Rocha MD,  performed the initial face to face bedside interview with this patient regarding history of present illness, review of symptoms and relevant past medical, social and family history.  I completed an independent physical examination.  I was the initial provider who evaluated this patient.   I personally saw the patient and performed a substantive portion of the visit including all aspects of the medical decision making.  The history, relevant review of systems, past medical and surgical history, medical decision making, and physical examination was documented by the scribe in my presence and I attest to the accuracy of the documentation.

## 2024-03-05 NOTE — ED PROVIDER NOTE - AXIS
Normal O-Z Plasty Text: The defect edges were debeveled with a #15 scalpel blade.  Given the location of the defect, shape of the defect and the proximity to free margins an O-Z plasty (double transposition flap) was deemed most appropriate.  Using a sterile surgical marker, the appropriate transposition flaps were drawn incorporating the defect and placing the expected incisions within the relaxed skin tension lines where possible.    The area thus outlined was incised deep to adipose tissue with a #15 scalpel blade.  The skin margins were undermined to an appropriate distance in all directions utilizing iris scissors.  Hemostasis was achieved with electrocautery.  The flaps were then transposed into place, one clockwise and the other counterclockwise, and anchored with interrupted buried subcutaneous sutures.

## 2024-03-05 NOTE — ED ADULT NURSE REASSESSMENT NOTE - NS ED NURSE REASSESS COMMENT FT1
Pt back from CT scan. HR noted to be going from 60's to 130's. Dr. Avendano aware. Additional fluids infusing as per MD order. Pt denies chest pain or palpitations. VS as noted.

## 2024-03-06 ENCOUNTER — NON-APPOINTMENT (OUTPATIENT)
Age: 79
End: 2024-03-06

## 2024-03-06 DIAGNOSIS — J10.1 INFLUENZA DUE TO OTHER IDENTIFIED INFLUENZA VIRUS WITH OTHER RESPIRATORY MANIFESTATIONS: ICD-10-CM

## 2024-03-06 DIAGNOSIS — J98.01 ACUTE BRONCHOSPASM: ICD-10-CM

## 2024-03-06 DIAGNOSIS — J98.11 ATELECTASIS: ICD-10-CM

## 2024-03-06 LAB
ALBUMIN SERPL ELPH-MCNC: 2.8 G/DL — LOW (ref 3.3–5)
ALP SERPL-CCNC: 68 U/L — SIGNIFICANT CHANGE UP (ref 40–120)
ALT FLD-CCNC: 11 U/L — LOW (ref 12–78)
ANION GAP SERPL CALC-SCNC: 3 MMOL/L — LOW (ref 5–17)
APTT BLD: 29.9 SEC — SIGNIFICANT CHANGE UP (ref 24.5–35.6)
AST SERPL-CCNC: 11 U/L — LOW (ref 15–37)
BASE EXCESS BLDA CALC-SCNC: -2.8 MMOL/L — LOW (ref -2–3)
BASOPHILS # BLD AUTO: 0.01 K/UL — SIGNIFICANT CHANGE UP (ref 0–0.2)
BASOPHILS NFR BLD AUTO: 0.2 % — SIGNIFICANT CHANGE UP (ref 0–2)
BILIRUB SERPL-MCNC: 0.4 MG/DL — SIGNIFICANT CHANGE UP (ref 0.2–1.2)
BLOOD GAS COMMENTS ARTERIAL: SIGNIFICANT CHANGE UP
BUN SERPL-MCNC: 20 MG/DL — SIGNIFICANT CHANGE UP (ref 7–23)
CALCIUM SERPL-MCNC: 8.6 MG/DL — SIGNIFICANT CHANGE UP (ref 8.5–10.1)
CHLORIDE SERPL-SCNC: 117 MMOL/L — HIGH (ref 96–108)
CO2 SERPL-SCNC: 23 MMOL/L — SIGNIFICANT CHANGE UP (ref 22–31)
CREAT SERPL-MCNC: 0.87 MG/DL — SIGNIFICANT CHANGE UP (ref 0.5–1.3)
EGFR: 68 ML/MIN/1.73M2 — SIGNIFICANT CHANGE UP
EOSINOPHIL # BLD AUTO: 0 K/UL — SIGNIFICANT CHANGE UP (ref 0–0.5)
EOSINOPHIL NFR BLD AUTO: 0 % — SIGNIFICANT CHANGE UP (ref 0–6)
GLUCOSE SERPL-MCNC: 209 MG/DL — HIGH (ref 70–99)
HCO3 BLDA-SCNC: 21 MMOL/L — SIGNIFICANT CHANGE UP (ref 21–28)
HCT VFR BLD CALC: 33.4 % — LOW (ref 34.5–45)
HGB BLD-MCNC: 9.8 G/DL — LOW (ref 11.5–15.5)
IMM GRANULOCYTES NFR BLD AUTO: 0.7 % — SIGNIFICANT CHANGE UP (ref 0–0.9)
INR BLD: 1.05 RATIO — SIGNIFICANT CHANGE UP (ref 0.85–1.18)
LYMPHOCYTES # BLD AUTO: 0.51 K/UL — LOW (ref 1–3.3)
LYMPHOCYTES # BLD AUTO: 9.1 % — LOW (ref 13–44)
MCHC RBC-ENTMCNC: 19.6 PG — LOW (ref 27–34)
MCHC RBC-ENTMCNC: 29.3 GM/DL — LOW (ref 32–36)
MCV RBC AUTO: 66.7 FL — LOW (ref 80–100)
MONOCYTES # BLD AUTO: 0.07 K/UL — SIGNIFICANT CHANGE UP (ref 0–0.9)
MONOCYTES NFR BLD AUTO: 1.3 % — LOW (ref 2–14)
NEUTROPHILS # BLD AUTO: 4.97 K/UL — SIGNIFICANT CHANGE UP (ref 1.8–7.4)
NEUTROPHILS NFR BLD AUTO: 88.7 % — HIGH (ref 43–77)
PCO2 BLDA: 32 MMHG — SIGNIFICANT CHANGE UP (ref 32–45)
PH BLDA: 7.42 — SIGNIFICANT CHANGE UP (ref 7.35–7.45)
PLATELET # BLD AUTO: 171 K/UL — SIGNIFICANT CHANGE UP (ref 150–400)
PO2 BLDA: 66 MMHG — LOW (ref 83–108)
POTASSIUM SERPL-MCNC: 3.6 MMOL/L — SIGNIFICANT CHANGE UP (ref 3.5–5.3)
POTASSIUM SERPL-SCNC: 3.6 MMOL/L — SIGNIFICANT CHANGE UP (ref 3.5–5.3)
PROT SERPL-MCNC: 6 GM/DL — SIGNIFICANT CHANGE UP (ref 6–8.3)
PROTHROM AB SERPL-ACNC: 11.8 SEC — SIGNIFICANT CHANGE UP (ref 9.5–13)
RBC # BLD: 5.01 M/UL — SIGNIFICANT CHANGE UP (ref 3.8–5.2)
RBC # FLD: 15.9 % — HIGH (ref 10.3–14.5)
SAO2 % BLDA: 95 % — SIGNIFICANT CHANGE UP (ref 94–98)
SODIUM SERPL-SCNC: 143 MMOL/L — SIGNIFICANT CHANGE UP (ref 135–145)
WBC # BLD: 5.6 K/UL — SIGNIFICANT CHANGE UP (ref 3.8–10.5)
WBC # FLD AUTO: 5.6 K/UL — SIGNIFICANT CHANGE UP (ref 3.8–10.5)

## 2024-03-06 PROCEDURE — 99223 1ST HOSP IP/OBS HIGH 75: CPT

## 2024-03-06 RX ORDER — ASPIRIN/CALCIUM CARB/MAGNESIUM 324 MG
81 TABLET ORAL DAILY
Refills: 0 | Status: DISCONTINUED | OUTPATIENT
Start: 2024-03-06 | End: 2024-03-07

## 2024-03-06 RX ORDER — IPRATROPIUM/ALBUTEROL SULFATE 18-103MCG
3 AEROSOL WITH ADAPTER (GRAM) INHALATION EVERY 4 HOURS
Refills: 0 | Status: DISCONTINUED | OUTPATIENT
Start: 2024-03-06 | End: 2024-03-07

## 2024-03-06 RX ORDER — FUROSEMIDE 40 MG
20 TABLET ORAL DAILY
Refills: 0 | Status: DISCONTINUED | OUTPATIENT
Start: 2024-03-06 | End: 2024-03-07

## 2024-03-06 RX ORDER — IPRATROPIUM/ALBUTEROL SULFATE 18-103MCG
3 AEROSOL WITH ADAPTER (GRAM) INHALATION ONCE
Refills: 0 | Status: COMPLETED | OUTPATIENT
Start: 2024-03-06 | End: 2024-03-06

## 2024-03-06 RX ORDER — BUDESONIDE, MICRONIZED 100 %
0.5 POWDER (GRAM) MISCELLANEOUS EVERY 12 HOURS
Refills: 0 | Status: DISCONTINUED | OUTPATIENT
Start: 2024-03-06 | End: 2024-03-07

## 2024-03-06 RX ORDER — CYCLOBENZAPRINE HYDROCHLORIDE 10 MG/1
5 TABLET, FILM COATED ORAL DAILY
Refills: 0 | Status: DISCONTINUED | OUTPATIENT
Start: 2024-03-06 | End: 2024-03-07

## 2024-03-06 RX ORDER — LOSARTAN POTASSIUM 100 MG/1
50 TABLET, FILM COATED ORAL DAILY
Refills: 0 | Status: DISCONTINUED | OUTPATIENT
Start: 2024-03-06 | End: 2024-03-07

## 2024-03-06 RX ORDER — APIXABAN 2.5 MG/1
5 TABLET, FILM COATED ORAL EVERY 12 HOURS
Refills: 0 | Status: DISCONTINUED | OUTPATIENT
Start: 2024-03-06 | End: 2024-03-07

## 2024-03-06 RX ORDER — ACETAMINOPHEN 500 MG
650 TABLET ORAL EVERY 6 HOURS
Refills: 0 | Status: DISCONTINUED | OUTPATIENT
Start: 2024-03-06 | End: 2024-03-07

## 2024-03-06 RX ORDER — ATORVASTATIN CALCIUM 80 MG/1
40 TABLET, FILM COATED ORAL AT BEDTIME
Refills: 0 | Status: DISCONTINUED | OUTPATIENT
Start: 2024-03-06 | End: 2024-03-07

## 2024-03-06 RX ORDER — METOPROLOL TARTRATE 50 MG
25 TABLET ORAL THREE TIMES A DAY
Refills: 0 | Status: DISCONTINUED | OUTPATIENT
Start: 2024-03-06 | End: 2024-03-07

## 2024-03-06 RX ADMIN — ATORVASTATIN CALCIUM 40 MILLIGRAM(S): 80 TABLET, FILM COATED ORAL at 22:43

## 2024-03-06 RX ADMIN — Medication 20 MILLIGRAM(S): at 05:55

## 2024-03-06 RX ADMIN — Medication 81 MILLIGRAM(S): at 10:58

## 2024-03-06 RX ADMIN — LOSARTAN POTASSIUM 50 MILLIGRAM(S): 100 TABLET, FILM COATED ORAL at 11:00

## 2024-03-06 RX ADMIN — Medication 650 MILLIGRAM(S): at 22:44

## 2024-03-06 RX ADMIN — Medication 3 MILLILITER(S): at 03:33

## 2024-03-06 RX ADMIN — Medication 25 MILLIGRAM(S): at 22:44

## 2024-03-06 RX ADMIN — Medication 75 MILLIGRAM(S): at 10:57

## 2024-03-06 RX ADMIN — Medication 40 MILLIGRAM(S): at 23:04

## 2024-03-06 RX ADMIN — Medication 75 MILLIGRAM(S): at 22:43

## 2024-03-06 RX ADMIN — APIXABAN 5 MILLIGRAM(S): 2.5 TABLET, FILM COATED ORAL at 22:43

## 2024-03-06 RX ADMIN — Medication 25 MILLIGRAM(S): at 05:55

## 2024-03-06 RX ADMIN — Medication 40 MILLIGRAM(S): at 11:01

## 2024-03-06 RX ADMIN — Medication 40 MILLIGRAM(S): at 17:17

## 2024-03-06 RX ADMIN — Medication 650 MILLIGRAM(S): at 23:30

## 2024-03-06 RX ADMIN — Medication 0.5 MILLIGRAM(S): at 20:41

## 2024-03-06 RX ADMIN — Medication 40 MILLIGRAM(S): at 05:55

## 2024-03-06 RX ADMIN — APIXABAN 5 MILLIGRAM(S): 2.5 TABLET, FILM COATED ORAL at 10:57

## 2024-03-06 RX ADMIN — Medication 3 MILLILITER(S): at 15:30

## 2024-03-06 RX ADMIN — Medication 3 MILLILITER(S): at 07:46

## 2024-03-06 RX ADMIN — Medication 3 MILLILITER(S): at 20:41

## 2024-03-06 NOTE — PHYSICAL THERAPY INITIAL EVALUATION ADULT - MODALITIES TREATMENT COMMENTS
pt left in bed supine post Eval 2 pt request; bed alarm on; daughter present; callbell in reach; pt instructed not to get up alone; call nursing for assist; giselle well; denied pain; isolation maintained

## 2024-03-06 NOTE — ED ADULT NURSE REASSESSMENT NOTE - NS ED NURSE REASSESS COMMENT FT1
Pt remains as previously assessed sitting comfortably in chair. Hospital bed placed in room vs stretcher for comfort. Call bell in reach-isolation precautions in place.

## 2024-03-06 NOTE — CONSULT NOTE ADULT - ASSESSMENT
Influenza A, bronchospasm, consider asthmatic bronchitis. RML atelectasis.   NC O2 as needed, currently on RA.  Tamiflu. On empiric antibiotic. Duonebs. Will add Budesonide nebs. Chest PT. Incentive spirometer. Encourage deep coughs.  Repeat CXR in AM.     CAD, h.o. stent, h.o. CHF, h.o. CVA, h.o. Behcet's, h.o. DVT.    CVT prophylaxis.  On Eliquis.  Influenza A, bronchospasm, consider asthmatic bronchitis. RML atelectasis.   NC O2 as needed, currently on RA.  Tamiflu. On empiric antibiotic. Duonebs. Will add Budesonide nebs.  Solumedrol 40 mg every 6 hrs.  Chest PT. Incentive spirometer. Encourage deep coughs.  Repeat CXR in 1-2 days.  Up in chair.     CAD, h.o. stent, h.o. CHF, h.o. CVA, h.o. Behcet's, h.o. DVT.    DVT prophylaxis.  On Eliquis.

## 2024-03-06 NOTE — ED ADULT NURSE REASSESSMENT NOTE - NS ED NURSE REASSESS COMMENT FT1
Pt received A&Ox4 VSS afebrile. Pt resting comfortably in bed-assisted OOB to chair with standby assist. Pt with MONTILLA noted and wheezing b/l. Pt remains on room air satting 95%. Assessment performed using  ipad-pt feeling "better". Plan of care reviewed. Isolation precautions in place. Call bell in reach.

## 2024-03-06 NOTE — CONSULT NOTE ADULT - SUBJECTIVE AND OBJECTIVE BOX
Patient is a 79y old  Female who presents with a chief complaint of cough and wheezing (06 Mar 2024 09:09)    HPI:  80 y/o F w/ PMH of behcet's syndorome, asthmatic bronchitis, CAD s/p PCI, chronic diastolic CHF, CVA, DVT on Eliquis, HTN, dyslipidemia, lumbago, epilepsy, scoliosis, p/w cough and wheezing. Son at bedside provides history, and states patient has been coughing and has been having SOB. Also states that patient c/o HA from the cough. +Generalized weakness. Patient to be flu positive in ED. RVP positive for influenza. Imaging shows No pulmonary embolism. Complete right middle lobe collapse, present since January 2024 chest  radiograph and new from 2021. Consider direct visualization to exclude an endobronchial lesion. Started on IV levaquin and tamiflu.         PSH: hysterectomy   PMH: as above    Meds: per reconciliation sheet, noted below  MEDICATIONS  (STANDING):  albuterol/ipratropium for Nebulization 3 milliLiter(s) Nebulizer every 4 hours  apixaban 5 milliGRAM(s) Oral every 12 hours  aspirin enteric coated 81 milliGRAM(s) Oral daily  atorvastatin 40 milliGRAM(s) Oral at bedtime  buDESOnide    Inhalation Suspension 0.5 milliGRAM(s) Inhalation every 12 hours  furosemide    Tablet 20 milliGRAM(s) Oral daily  levoFLOXacin IVPB 750 milliGRAM(s) IV Intermittent every 24 hours  losartan 50 milliGRAM(s) Oral daily  methylPREDNISolone sodium succinate Injectable 40 milliGRAM(s) IV Push every 6 hours  metoprolol tartrate 25 milliGRAM(s) Oral three times a day  oseltamivir 75 milliGRAM(s) Oral two times a day    Allergies    penicillin (Rash)  meropenem (Short breath)    Intolerances      Social: no smoking, no alcohol, no illegal drugs; no recent travel, no exposure to TB  FAMILY HISTORY:  No pertinent family history in first degree relatives       no history of premature cardiovascular disease in first degree relatives    ROS: the patient denies fever, no chills, no HA, no dizziness, no sore throat, no blurry vision, no CP, no palpitations, + SOB, + cough, no abdominal pain, no diarrhea, no N/V, no dysuria, no leg pain, no claudication, no rash, no joint aches, no rectal pain or bleeding, no night sweats    All other systems reviewed and are negative    Vital Signs Last 24 Hrs  T(C): 37 (06 Mar 2024 06:30), Max: 38 (05 Mar 2024 15:23)  T(F): 98.6 (06 Mar 2024 06:30), Max: 100.4 (05 Mar 2024 15:23)  HR: 62 (06 Mar 2024 06:30) (62 - 133)  BP: 105/57 (06 Mar 2024 06:30) (95/54 - 147/81)  BP(mean): 71 (06 Mar 2024 06:30) (53 - 108)  RR: 20 (06 Mar 2024 06:30) (18 - 27)  SpO2: 94% (06 Mar 2024 06:30) (94% - 100%)    Parameters below as of 06 Mar 2024 04:10  Patient On (Oxygen Delivery Method): room air      Daily Height in cm: 165.1 (05 Mar 2024 15:23)    Daily     PE:  Constitutional: NAD   HEENT: NC/AT, EOMI, PERRLA, conjunctivae clear; ears and nose atraumatic; pharynx benign  Neck: supple; thyroid not palpable  Back: no tenderness  Respiratory: decreased breath sounds, rhonchi   Cardiovascular: S1S2 regular, no murmurs  Abdomen: soft, not tender, not distended, positive BS; liver and spleen WNL  Genitourinary: no suprapubic tenderness  Lymphatic: no LN palpable  Musculoskeletal: no muscle tenderness, no joint swelling or tenderness  Extremities: no pedal edema  Neurological/ Psychiatric: AxOx3, Judgement and insight normal;  moving all extremities  Skin: no rashes; no palpable lesions    Labs: all available labs reviewed                        9.8    5.60  )-----------( 171      ( 06 Mar 2024 07:08 )             33.4     03-06    143  |  117<H>  |  20  ----------------------------<  209<H>  3.6   |  23  |  0.87    Ca    8.6      06 Mar 2024 07:08    TPro  6.0  /  Alb  2.8<L>  /  TBili  0.4  /  DBili  x   /  AST  11<L>  /  ALT  11<L>  /  AlkPhos  68  03-06     LIVER FUNCTIONS - ( 06 Mar 2024 07:08 )  Alb: 2.8 g/dL / Pro: 6.0 gm/dL / ALK PHOS: 68 U/L / ALT: 11 U/L / AST: 11 U/L / GGT: x           Urinalysis Basic - ( 06 Mar 2024 07:08 )    Color: x / Appearance: x / SG: x / pH: x  Gluc: 209 mg/dL / Ketone: x  / Bili: x / Urobili: x   Blood: x / Protein: x / Nitrite: x   Leuk Esterase: x / RBC: x / WBC x   Sq Epi: x / Non Sq Epi: x / Bacteria: x          Radiology: all available radiological tests reviewed  < from: CT Angio Chest PE Protocol w/ IV Cont (03.05.24 @ 18:18) >  ACC: [id number] EXAM:  CT ANGIO CHEST PULM ART [facility]   ORDERED BY:   TRACI CABRERA     PROCEDURE DATE:  03/05/2024          INTERPRETATION:  CLINICAL INDICATION: History of DVT with shortness of   breath and tachycardia, rule out pulmonary embolism    TECHNIQUE: A volumetric acquisition of the chest was obtained from the   thoracic inlet to the upper abdomen during dynamic administration of 70cc   intravenous contrast according to the PE protocol. 3D MIP images were   provided.    COMPARISON: Chest CT 6/17/2018. Chest radiographs 1/3/2024    FINDINGS:  CTA: The study is technically adequate with a good contrast bolus to the   pulmonary arteries. No pulmonary embolism. The great vessels are normal   in size. Coronary artery calcification with circumflex artery stenting.   The heart is normal in size. Question apical myocardial thickening. No   pericardial effusion.    Lungs/Airways/Pleura: The central airways are patent. No pleural   effusion. Complete right middle lobe atelectasis, as seen on the January 2024 chest radiograph, and new from 2021. Right lower lobe calcified   granuloma.    Mediastinum/Lymph nodes: Small calcified left hilar nodes are likely   sequela of old granulomatous disease. Small hiatal hernia.    Upper Abdomen: Calcified hepatic granulomas.    Bones and Soft Tissues: No aggressive osseous lesions.    IMPRESSION:  No pulmonary embolism.    Complete right middle lobe collapse, present since January 2024 chest   radiograph and new from 2021. Consider direct visualization to exclude an   endobronchial lesion.      < end of copied text >    Advanced directives addressed: full resuscitation

## 2024-03-06 NOTE — PHYSICAL THERAPY INITIAL EVALUATION ADULT - CRITERIA FOR SKILLED THERAPEUTIC INTERVENTIONS
will attempt completion of Eval @ later date; pt currently refusing functional assessment; further course of PT intervention pending

## 2024-03-06 NOTE — ED ADULT NURSE REASSESSMENT NOTE - NS ED NURSE REASSESS COMMENT FT1
received pt from day RN PRASHANT Flood. pt resting comfortably at this time. no signs of distress noted. VSS as charted. no further complaints or discomforts reported at this time. safety and comfort maintained. Son at bedside

## 2024-03-06 NOTE — PATIENT PROFILE ADULT - FALL HARM RISK - HARM RISK INTERVENTIONS

## 2024-03-06 NOTE — CONSULT NOTE ADULT - ASSESSMENT
80 y/o F w/ PMH of behcet's syndorome, asthmatic bronchitis, CAD s/p PCI, chronic diastolic CHF, CVA, DVT on Eliquis, HTN, dyslipidemia, lumbago, epilepsy, scoliosis, p/w cough and wheezing. Son at bedside provides history, and states patient has been coughing and has been having SOB. Also states that patient c/o HA from the cough. +Generalized weakness. Patient to be flu positive in ED. RVP positive for influenza. Imaging shows No pulmonary embolism. Complete right middle lobe collapse, present since January 2024 chest  radiograph and new from 2021. Consider direct visualization to exclude an endobronchial lesion. Started on IV levaquin and tamiflu.     1. Viral syndrome. Influenza. RML atelectasis/lung collapse. Asthma exacerbation  - imaging reviewed  - agree with levaquin [id number] for now  - on steroids for asthma  - on tamiflu 75mg BID 5 day course  - monitor temps  - tolerating abx well so far; no side effects noted  - reason for abx use and side effects reviewed with patient  - supportive care  - isolation precautions  - fu cbc    Clinical team may change from intravenous to oral antibiotics when the following criteria are met:   1. Patient is clinically improving/stable       a)	Improved signs and symptoms of infection from initial presentation       b)	Afebrile for 24 hours       c)	Leukocytosis trending towards normal range   2. Patient is tolerating oral intake   3. Initial blood cultures are negative     When above criteria met may change iv antibiotics to: po levaquin [id number] x 7 day course

## 2024-03-06 NOTE — PATIENT PROFILE ADULT - FALL HARM RISK - FALLEN IN PAST
Message from MyChart:  Original authorizing provider: MD Michelle Kunz would like a refill of the following medications:  azelastine (ASTELIN) 0.1 % spray [Kade Luu MD]    Preferred pharmacy: [id number] [id number] [facility]    Comment:    
No

## 2024-03-06 NOTE — CONSULT NOTE ADULT - SUBJECTIVE AND OBJECTIVE BOX
Patient is a 79y old  Female who presents with a chief complaint of cough and wheezing (06 Mar 2024 10:26)    ________________________________  ALO ÁLVAREZ is a 79y year old Female with a past medical history of CAD status post PCI and circumflex, with repeat cardiac catheterization 2/1/2024 for unstable angina showing patent stent and nonobstructive CAD in LAD, chronic diastolic heart failure, history of CVA, DVT, on anticoagulation, hypertension, hyperlipidemia and obesity.  Patient presents with cough, wheezing, admitted for shortness of breath.  Noted to be positive for influenza.  BNP elevated on arrival, improved.  Mild lower extremity edema on exam and wheezing.  No chest discomfort.  No palpitations.    PREVIOUS CARDIAC WORKUP:      Cardiac Catheterization 2/1/2024  Patent stent and LCx.   Hypocontractile LV function.  Estimated EF greater than 75%.   Ball[place] shaped ventricle with suggestion of apical hypertrophy on LV  angiography.  Elevated LVEDP 17 mmHg.   No aortic valve stenosis.       Coronary Angiography   The coronary circulation is right dominant.      LM   Left main artery: The segment is large and mildly calcified.  Angiography shows no disease.    LAD   Left anterior descending artery: The segment is large and moderately  calcified. Proximal left anterior descending: There is a 30  % stenosis.      CX   Circumflex: The segment is large. Angiography shows minor  irregularities.  Patent stent.    Left Heart Cath   Left ventricular function was assessed. Normal wal motion. Suggestion  of apical hypertrophy. Global leftventricular function is  hypercontractile. Ejection fraction was visually estimated by  estimation with a value of 75%. LV to AO pullback was performed.  The left ventricular end diastolic pressure was 17 mmHg. LHC  performed: Aortic valve crossed and left ventricular pressure  obtained.          ________________________________  Review of systems: A 10 point review of system has been performed, and is negative except for what has been mentioned in the above history of present illness.     PAST MEDICAL & SURGICAL HISTORY:  HTN (hypertension)      DVT (deep venous thrombosis)      CVA (cerebral vascular accident)      CHF (congestive heart failure)      HLD (hyperlipidemia)      Nonintractable epilepsy without status epilepticus  2/23/2018      Asthmatic bronchitis      Behcet's syndrome      Lumbago      Scoliosis      S/P hysterectomy        FAMILY HISTORY:  No pertinent family history in first degree relatives         SOCIAL HISTORY: The patient denies any tobacco abuse, alcohol abuse or illicit drug use.    ALLERGIES:  penicillin (Rash)  meropenem (Short breath)    Home Medications:  aspirin 81 mg oral delayed release tablet: 1 tab(s) orally once a day (05 Mar 2024 21:50)  atorvastatin 40 mg oral tablet: 1 tab(s) orally once a day (at bedtime) (05 Mar 2024 21:51)  cyclobenzaprine 5 mg oral tablet: 1 tab(s) orally once a day as needed for  muscle spasm (05 Mar 2024 21:51)  Eliquis 2.5 mg oral tablet: 1 tab(s) orally 2 times a day (05 Mar 2024 21:51)  losartan 50 mg oral tablet: 1 tab(s) orally once a day (in the morning) (05 Mar 2024 21:52)  metoprolol tartrate 25 mg oral tablet: 1 tab(s) orally 3 times a day (05 Mar 2024 21:52)    MEDICATIONS  (STANDING):  albuterol/ipratropium for Nebulization 3 milliLiter(s) Nebulizer every 4 hours  apixaban 5 milliGRAM(s) Oral every 12 hours  aspirin enteric coated 81 milliGRAM(s) Oral daily  atorvastatin 40 milliGRAM(s) Oral at bedtime  buDESOnide    Inhalation Suspension 0.5 milliGRAM(s) Inhalation every 12 hours  furosemide    Tablet 20 milliGRAM(s) Oral daily  levoFLOXacin IVPB 750 milliGRAM(s) IV Intermittent every 24 hours  losartan 50 milliGRAM(s) Oral daily  methylPREDNISolone sodium succinate Injectable 40 milliGRAM(s) IV Push every 6 hours  metoprolol tartrate 25 milliGRAM(s) Oral three times a day  oseltamivir 75 milliGRAM(s) Oral two times a day    MEDICATIONS  (PRN):  acetaminophen     Tablet .. 650 milliGRAM(s) Oral every 6 hours PRN Temp greater or equal to 38C (100.4F), Mild Pain (1 - 3)  cyclobenzaprine 5 milliGRAM(s) Oral daily PRN Muscle Spasm    Vital Signs Last 24 Hrs  T(C): 37.4 (06 Mar 2024 14:07), Max: 37.4 (06 Mar 2024 14:07)  T(F): 99.4 (06 Mar 2024 14:07), Max: 99.4 (06 Mar 2024 14:07)  HR: 77 (06 Mar 2024 15:39) (57 - 133)  BP: 129/53 (06 Mar 2024 14:07) (95/54 - 129/63)  BP(mean): 71 (06 Mar 2024 06:30) (61 - 108)  RR: 19 (06 Mar 2024 14:07) (19 - 27)  SpO2: 98% (06 Mar 2024 15:39) (94% - 100%)    Parameters below as of 06 Mar 2024 15:39  Patient On (Oxygen Delivery Method): room air      I&O's Summary    ________________________________  GENERAL APPEARANCE:  No acute distress, obese  HEAD: normocephalic, atraumatic  NECK: supple, no jugular venous distention, no carotid bruit    HEART: Regular rate and rhythm, S1, S2 normal, 1/6 murmur    CHEST:  No anterior chest wall tenderness    LUNGS: Mild wheezing    ABDOMEN: soft, nontender, nondistended, with positive bowel sounds appreciated  EXTREMITIES: Minimal lower extremity edema.   NEURO: Alert and oriented x3  PSYC:  Normal affect  SKIN:  Dry  ________________________________   TELEMETRY: Sinus rhythm    ECG: Sinus rhythm, nonspecific changes    LABS:                        9.8    5.60  )-----------( 171      ( 06 Mar 2024 07:08 )             33.4             03-06    143  |  117<H>  |  20  ----------------------------<  209<H>  3.6   |  23  |  0.87    Ca    8.6      06 Mar 2024 07:08    TPro  6.0  /  Alb  2.8<L>  /  TBili  0.4  /  DBili  x   /  AST  11<L>  /  ALT  11<L>  /  AlkPhos  68  03-06      LIVER FUNCTIONS - ( 06 Mar 2024 07:08 )  Alb: 2.8 g/dL / Pro: 6.0 gm/dL / ALK PHOS: 68 U/L / ALT: 11 U/L / AST: 11 U/L / GGT: x         PT/INR - ( 06 Mar 2024 07:08 )   PT: 11.8 sec;   INR: 1.05 ratio         PTT - ( 06 Mar 2024 07:08 )  PTT:29.9 sec  Urinalysis Basic - ( 06 Mar 2024 07:08 )    Color: x / Appearance: x / SG: x / pH: x  Gluc: 209 mg/dL / Ketone: x  / Bili: x / Urobili: x   Blood: x / Protein: x / Nitrite: x   Leuk Esterase: x / RBC: x / WBC x   Sq Epi: x / Non Sq Epi: x / Bacteria: x        PT/INR - ( 06 Mar 2024 07:08 )   PT: 11.8 sec;   INR: 1.05 ratio         PTT - ( 06 Mar 2024 07:08 )  PTT:29.9 sec  Urinalysis Basic - ( 06 Mar 2024 07:08 )    Color: x / Appearance: x / SG: x / pH: x  Gluc: 209 mg/dL / Ketone: x  / Bili: x / Urobili: x   Blood: x / Protein: x / Nitrite: x   Leuk Esterase: x / RBC: x / WBC x   Sq Epi: x / Non Sq Epi: x / Bacteria: x      ABG - ( 06 Mar 2024 07:51 )  pH, Arterial: 7.42  pH, Blood: x     /  pCO2: 32    /  pO2: 66    / HCO3: 21    / Base Excess: -2.8  /  SaO2: 95         ________________________________    RADIOLOGY & ADDITIONAL STUDIES: IMPRESSION:  No pulmonary embolism.    Complete right middle lobe collapse, present since January 2024 chest   radiograph and new from 2021. Consider direct visualization to exclude an   endobronchial lesion.    ________________________________    ASSESSMENT:  CAD status post PCI to circumflex in the past, with cardiac cath removed 2024 showing patent stent and nonobstructive CAD  Compensated diastolic heart failure  Hypertension  Hyperlipidemia  History of CVA  History of DVT on anticoagulation  Shortness of breath due to influenza infection    PLAN:  In summary, this is a 79y Female with a past medical history of CAD and diastolic heart failure.  Patient now presents with cough and shortness of breath.  Likely due to influenza infection.  Compensated from heart failure standpoint.  Continue oral diuretic.  Continue anticoagulation.  Continue aspirin for history of CAD.  Continue statin.  Continue beta-blocker.        ____________________________________________  (Dragon Dictation software used). Thank you for allowing me to participate in the care of your patient. Please contact me should any questions arise.    YOUNG Manzano DO, [facility]  Office: [phone]

## 2024-03-06 NOTE — H&P ADULT - RESPIRATORY
no respiratory distress/airway patent/breath sounds equal/good air movement/wheezes/respirations labored

## 2024-03-06 NOTE — CHART NOTE - NSCHARTNOTEFT_GEN_A_CORE
I have personally seen and examined patient on the above date.  I have reviewed the H+P completed by my colleague this AM and I agree with findings and plan as detailed per H+P unless otherwise stated below.    Sepsis secondary to influenza  - cont tamiflu  - cont levaquin for possible underlying pneumonia  - cont duonebs and steroids  - ID and Pulm following, appreciate recs

## 2024-03-06 NOTE — H&P ADULT - ASSESSMENT
80 y/o F w/ PMH of behcet's syndorome, asthmatic bronchitis, CAD s/p PCI, chronic diastolic CHF, CVA, DVT on Eliquis, HTN, dyslipidemia, lumbago, epilepsy, scoliosis, p/w cough and wheezing    *Sepsis 2/2 Influenza w/ possible asthma exacerbation and h/o diastolic CHF   -CT reports Complete RML collapse, present since 1/2024 chest radiograph and new from 2021. Consider direct visualization to exclude an endobronchial lesion.  -Tamiflu  -Will cover with levofloxacin for possible bacterial infection as well  -Duonebs  -Steroids   -ABG  -ID consult   -C/w home lasix  -I/Os + Daily weights   -Cardio consult    *H/o behcet's syndrome / diastolic CHF / asthmatic bronchitis  / CAD / CVA / DVT / HTN / dyslipidemia / epilepsy  -C/w home meds and f/u outpatient for further management if conditions remain stable during hospitalization    *DVT ppx  -On Eliquis     >75 mins required for admission    80 y/o F w/ PMH of behcet's syndorome, asthmatic bronchitis, CAD s/p PCI, chronic diastolic CHF, CVA, DVT on Eliquis, HTN, dyslipidemia, lumbago, epilepsy, scoliosis, p/w cough and wheezing    *Sepsis 2/2 Influenza w/ possible asthma exacerbation and h/o diastolic CHF   -CT reports Complete RML collapse, present since 1/2024 chest radiograph and new from 2021. Consider direct visualization to exclude an endobronchial lesion.  -Tamiflu  -Will cover with levofloxacin for possible bacterial infection as well  -Duonebs  -Steroids   -ABG  -ID consult   -C/w home lasix  -I/Os + Daily weights   -Cardio consult    *H/o behcet's syndrome  / CAD / CVA / DVT / HTN / dyslipidemia / epilepsy  -C/w home meds and f/u outpatient for further management if conditions remain stable during hospitalization    *DVT ppx  -On Eliquis     >75 mins required for admission

## 2024-03-06 NOTE — CONSULT NOTE ADULT - SUBJECTIVE AND OBJECTIVE BOX
HPI:  80 y/o F w/ PMH of behcet's syndorome, asthmatic bronchitis, CAD s/p PCI, chronic diastolic CHF, CVA, DVT on Eliquis, HTN, dyslipidemia, lumbago, epilepsy, scoliosis, p/w cough and wheezing. Son at bedside provides history, and states patient has been coughing and has been having SOB. Also states that patient c/o HA from the cough. +Generalized weakness. Patient to be flu positive in ED.     3/6:  Persian  used for today's visit. Pt is nonsmoker.  Has cough, no hemoptysis.  Notes wheezing.           In bed, on RA, no distress, no SOB appearing.         PSH: hysterectomy     Social Hx: No smoking / etoh / drugs     Family Hx: No known pertinent family hx  (06 Mar 2024 03:40)      PAST MEDICAL & SURGICAL HISTORY:  HTN (hypertension)      DVT (deep venous thrombosis)      CVA (cerebral vascular accident)      CHF (congestive heart failure)      HLD (hyperlipidemia)      Nonintractable epilepsy without status epilepticus  2/23/2018      Asthmatic bronchitis      Behcet's syndrome      Lumbago      Scoliosis      S/P hysterectomy          MEDICATIONS  (STANDING):  albuterol/ipratropium for Nebulization 3 milliLiter(s) Nebulizer every 4 hours  apixaban 5 milliGRAM(s) Oral every 12 hours  aspirin enteric coated 81 milliGRAM(s) Oral daily  atorvastatin 40 milliGRAM(s) Oral at bedtime  furosemide    Tablet 20 milliGRAM(s) Oral daily  levoFLOXacin IVPB 750 milliGRAM(s) IV Intermittent every 24 hours  losartan 50 milliGRAM(s) Oral daily  methylPREDNISolone sodium succinate Injectable 40 milliGRAM(s) IV Push every 6 hours  metoprolol tartrate 25 milliGRAM(s) Oral three times a day  oseltamivir 75 milliGRAM(s) Oral two times a day    MEDICATIONS  (PRN):  acetaminophen     Tablet .. 650 milliGRAM(s) Oral every 6 hours PRN Temp greater or equal to 38C (100.4F), Mild Pain (1 - 3)  cyclobenzaprine 5 milliGRAM(s) Oral daily PRN Muscle Spasm      Allergies    penicillin (Rash)  meropenem (Short breath)    Intolerances        SOCIAL HISTORY: Denies tobacco, etoh abuse or illicit drug use    FAMILY HISTORY:  No pertinent family history in first degree relatives        Vital Signs Last 24 Hrs  T(C): 37 (06 Mar 2024 06:30), Max: 38 (05 Mar 2024 15:23)  T(F): 98.6 (06 Mar 2024 06:30), Max: 100.4 (05 Mar 2024 15:23)  HR: 62 (06 Mar 2024 06:30) (62 - 133)  BP: 105/57 (06 Mar 2024 06:30) (95/54 - 147/81)  BP(mean): 71 (06 Mar 2024 06:30) (53 - 108)  RR: 20 (06 Mar 2024 06:30) (18 - 27)  SpO2: 94% (06 Mar 2024 06:30) (94% - 100%)    Parameters below as of 06 Mar 2024 04:10  Patient On (Oxygen Delivery Method): room air        REVIEW OF SYSTEMS:    CONSTITUTIONAL:  As per HPI.  HEENT:  Eyes:  No diplopia or blurred vision. ENT:  No earache, sore throat or runny nose.  CARDIOVASCULAR:  No pressure, squeezing, tightness, heaviness or aching about the chest, neck, axilla or epigastrium.  RESPIRATORY:  See above.  GASTROINTESTINAL:  No nausea, vomiting or diarrhea.  GENITOURINARY:  No dysuria, frequency or urgency.  MUSCULOSKELETAL:  As per HPI.  SKIN:  No change in skin, hair or nails.  NEUROLOGIC:  No paresthesias, fasciculations, seizures or weakness.  PSYCHIATRIC:  No disorder of thought or mood.  ENDOCRINE:  No heat or cold intolerance, polyuria or polydipsia.  HEMATOLOGICAL:  No easy bruising or bleedings:  .     PHYSICAL EXAMINATION:    GENERAL APPEARANCE:  Pt. is not currently dyspneic, in no distress. Pt. is alert, oriented, and pleasant.  HEENT:  Pupils are normal and react normally. No icterus. Mucous membranes well colored.  NECK:  Supple. No lymphadenopathy. Jugular venous pressure not elevated. Carotids equal.   HEART:   The cardiac impulse has a normal quality. Regular. Normal S1 and S2. There are no murmurs, rubs or gallops noted  CHEST:  Bilateral wheezing audible.  Normal respiratory effort.  ABDOMEN:  Soft and nontender.   EXTREMITIES:  There is no cyanosis, clubbing.  Trace to 1 plus LE edema.   SKIN:  No rash or significant lesions are noted.  Neuro: Alert, awake, and O x 3.      LABS:                        9.8    5.60  )-----------( 171      ( 06 Mar 2024 07:08 )             33.4     03-06    143  |  117<H>  |  20  ----------------------------<  209<H>  3.6   |  23  |  0.87    Ca    8.6      06 Mar 2024 07:08    TPro  6.0  /  Alb  2.8<L>  /  TBili  0.4  /  DBili  x   /  AST  11<L>  /  ALT  11<L>  /  AlkPhos  68  03-06    LIVER FUNCTIONS - ( 06 Mar 2024 07:08 )  Alb: 2.8 g/dL / Pro: 6.0 gm/dL / ALK PHOS: 68 U/L / ALT: 11 U/L / AST: 11 U/L / GGT: x           PT/INR - ( 06 Mar 2024 07:08 )   PT: 11.8 sec;   INR: 1.05 ratio         PTT - ( 06 Mar 2024 07:08 )  PTT:29.9 sec      Urinalysis Basic - ( 06 Mar 2024 07:08 )    Color: x / Appearance: x / SG: x / pH: x  Gluc: 209 mg/dL / Ketone: x  / Bili: x / Urobili: x   Blood: x / Protein: x / Nitrite: x   Leuk Esterase: x / RBC: x / WBC x   Sq Epi: x / Non Sq Epi: x / Bacteria: x      ABG - ( 06 Mar 2024 07:51 )  pH, Arterial: 7.42  pH, Blood: x     /  pCO2: 32    /  pO2: 66    / HCO3: 21    / Base Excess: -2.8  /  SaO2: 95                  RADIOLOGY & ADDITIONAL STUDIES:       ACC: [id number] EXAM:  [facility]   ORDERED BY:   TRACI CABRERA     PROCEDURE DATE:  03/05/2024          INTERPRETATION:  CLINICAL INDICATION: History of DVT with shortness of   breath and tachycardia, rule out pulmonary embolism    TECHNIQUE: A volumetric acquisition of the chest was obtained from the   thoracic inlet to the upper abdomen during dynamic administration of 70cc   intravenous contrast according to the PE protocol. 3D MIP images were   provided.    COMPARISON: Chest CT 6/17/2018. Chest radiographs 1/3/2024    FINDINGS:  CTA: The study is technically adequate with a good contrast bolus to the   pulmonary arteries. No pulmonary embolism. The great vessels are normal   in size. Coronary artery calcification with circumflex artery stenting.   The heart is normal in size. Question apical myocardial thickening. No   pericardial effusion.    Lungs/Airways/Pleura: The central airways are patent. No pleural   effusion. Complete right middle lobe atelectasis, as seen on the January 2024 chest radiograph, and new from 2021. Right lower lobe calcified   granuloma.    Mediastinum/Lymph nodes: Small calcified left hilar nodes are likely   sequela of old granulomatous disease. Small hiatal hernia.    Upper Abdomen: Calcified hepatic granulomas.    Bones and Soft Tissues: No aggressive osseous lesions.    IMPRESSION:  No pulmonary embolism.    Complete right middle lobe collapse, present since January 2024 chest   radiograph and new from 2021. Consider direct visualization to exclude an   endobronchial lesion.       HPI:  78 y/o F w/ PMH of behcet's syndorome, asthmatic bronchitis, CAD s/p PCI, chronic diastolic CHF, CVA, DVT on Eliquis, HTN, dyslipidemia, lumbago, epilepsy, scoliosis, p/w cough and wheezing. Son at bedside provides history, and states patient has been coughing and has been having SOB. Also states that patient c/o HA from the cough. +Generalized weakness. Patient to be flu positive in ED.     3/6:  Luxembourgish  used for today's visit. Pt is nonsmoker.  Has cough, no hemoptysis.  Notes wheezing.           In bed, on RA, no distress, not SOB appearing.         PSH: hysterectomy     Social Hx: No smoking / etoh / drugs     Family Hx: No known pertinent family hx  (06 Mar 2024 03:40)      PAST MEDICAL & SURGICAL HISTORY:  HTN (hypertension)      DVT (deep venous thrombosis)      CVA (cerebral vascular accident)      CHF (congestive heart failure)      HLD (hyperlipidemia)      Nonintractable epilepsy without status epilepticus  2/23/2018      Asthmatic bronchitis      Behcet's syndrome      Lumbago      Scoliosis      S/P hysterectomy          MEDICATIONS  (STANDING):  albuterol/ipratropium for Nebulization 3 milliLiter(s) Nebulizer every 4 hours  apixaban 5 milliGRAM(s) Oral every 12 hours  aspirin enteric coated 81 milliGRAM(s) Oral daily  atorvastatin 40 milliGRAM(s) Oral at bedtime  furosemide    Tablet 20 milliGRAM(s) Oral daily  levoFLOXacin IVPB 750 milliGRAM(s) IV Intermittent every 24 hours  losartan 50 milliGRAM(s) Oral daily  methylPREDNISolone sodium succinate Injectable 40 milliGRAM(s) IV Push every 6 hours  metoprolol tartrate 25 milliGRAM(s) Oral three times a day  oseltamivir 75 milliGRAM(s) Oral two times a day    MEDICATIONS  (PRN):  acetaminophen     Tablet .. 650 milliGRAM(s) Oral every 6 hours PRN Temp greater or equal to 38C (100.4F), Mild Pain (1 - 3)  cyclobenzaprine 5 milliGRAM(s) Oral daily PRN Muscle Spasm      Allergies    penicillin (Rash)  meropenem (Short breath)    Intolerances        SOCIAL HISTORY: Denies tobacco, etoh abuse or illicit drug use    FAMILY HISTORY:  No pertinent family history in first degree relatives        Vital Signs Last 24 Hrs  T(C): 37 (06 Mar 2024 06:30), Max: 38 (05 Mar 2024 15:23)  T(F): 98.6 (06 Mar 2024 06:30), Max: 100.4 (05 Mar 2024 15:23)  HR: 62 (06 Mar 2024 06:30) (62 - 133)  BP: 105/57 (06 Mar 2024 06:30) (95/54 - 147/81)  BP(mean): 71 (06 Mar 2024 06:30) (53 - 108)  RR: 20 (06 Mar 2024 06:30) (18 - 27)  SpO2: 94% (06 Mar 2024 06:30) (94% - 100%)    Parameters below as of 06 Mar 2024 04:10  Patient On (Oxygen Delivery Method): room air        REVIEW OF SYSTEMS:    CONSTITUTIONAL:  As per HPI.  HEENT:  Eyes:  No diplopia or blurred vision. ENT:  No earache, sore throat or runny nose.  CARDIOVASCULAR:  No pressure, squeezing, tightness, heaviness or aching about the chest, neck, axilla or epigastrium.  RESPIRATORY:  See above.  GASTROINTESTINAL:  No nausea, vomiting or diarrhea.  GENITOURINARY:  No dysuria, frequency or urgency.  MUSCULOSKELETAL:  As per HPI.  SKIN:  No change in skin, hair or nails.  NEUROLOGIC:  No paresthesias, fasciculations, seizures or weakness.  PSYCHIATRIC:  No disorder of thought or mood.  ENDOCRINE:  No heat or cold intolerance, polyuria or polydipsia.  HEMATOLOGICAL:  No easy bruising or bleedings:  .     PHYSICAL EXAMINATION:    GENERAL APPEARANCE:  Pt. is not currently dyspneic, in no distress. Pt. is alert, oriented, and pleasant.  HEENT:  Pupils are normal and react normally. No icterus. Mucous membranes well colored.  NECK:  Supple. No lymphadenopathy. Jugular venous pressure not elevated. Carotids equal.   HEART:   The cardiac impulse has a normal quality. Regular. Normal S1 and S2. There are no murmurs, rubs or gallops noted  CHEST:  Bilateral wheezing audible.  Normal respiratory effort.  ABDOMEN:  Soft and nontender.   EXTREMITIES:  There is no cyanosis, clubbing.  Trace to 1 plus LE edema.   SKIN:  No rash or significant lesions are noted.  Neuro: Alert, awake, and O x 3.      LABS:                        9.8    5.60  )-----------( 171      ( 06 Mar 2024 07:08 )             33.4     03-06    143  |  117<H>  |  20  ----------------------------<  209<H>  3.6   |  23  |  0.87    Ca    8.6      06 Mar 2024 07:08    TPro  6.0  /  Alb  2.8<L>  /  TBili  0.4  /  DBili  x   /  AST  11<L>  /  ALT  11<L>  /  AlkPhos  68  03-06    LIVER FUNCTIONS - ( 06 Mar 2024 07:08 )  Alb: 2.8 g/dL / Pro: 6.0 gm/dL / ALK PHOS: 68 U/L / ALT: 11 U/L / AST: 11 U/L / GGT: x           PT/INR - ( 06 Mar 2024 07:08 )   PT: 11.8 sec;   INR: 1.05 ratio         PTT - ( 06 Mar 2024 07:08 )  PTT:29.9 sec      Urinalysis Basic - ( 06 Mar 2024 07:08 )    Color: x / Appearance: x / SG: x / pH: x  Gluc: 209 mg/dL / Ketone: x  / Bili: x / Urobili: x   Blood: x / Protein: x / Nitrite: x   Leuk Esterase: x / RBC: x / WBC x   Sq Epi: x / Non Sq Epi: x / Bacteria: x      ABG - ( 06 Mar 2024 07:51 )  pH, Arterial: 7.42  pH, Blood: x     /  pCO2: 32    /  pO2: 66    / HCO3: 21    / Base Excess: -2.8  /  SaO2: 95                  RADIOLOGY & ADDITIONAL STUDIES:       ACC: [id number] [facility]   ORDERED BY:   TRACI CABRERA     PROCEDURE DATE:  03/05/2024          INTERPRETATION:  CLINICAL INDICATION: History of DVT with shortness of   breath and tachycardia, rule out pulmonary embolism    TECHNIQUE: A volumetric acquisition of the chest was obtained from the   thoracic inlet to the upper abdomen during dynamic administration of 70cc   intravenous contrast according to the PE protocol. 3D MIP images were   provided.    COMPARISON: Chest CT 6/17/2018. Chest radiographs 1/3/2024    FINDINGS:  CTA: The study is technically adequate with a good contrast bolus to the   pulmonary arteries. No pulmonary embolism. The great vessels are normal   in size. Coronary artery calcification with circumflex artery stenting.   The heart is normal in size. Question apical myocardial thickening. No   pericardial effusion.    Lungs/Airways/Pleura: The central airways are patent. No pleural   effusion. Complete right middle lobe atelectasis, as seen on the January 2024 chest radiograph, and new from 2021. Right lower lobe calcified   granuloma.    Mediastinum/Lymph nodes: Small calcified left hilar nodes are likely   sequela of old granulomatous disease. Small hiatal hernia.    Upper Abdomen: Calcified hepatic granulomas.    Bones and Soft Tissues: No aggressive osseous lesions.    IMPRESSION:  No pulmonary embolism.    Complete right middle lobe collapse, present since January 2024 chest   radiograph and new from 2021. Consider direct visualization to exclude an   endobronchial lesion.

## 2024-03-06 NOTE — H&P ADULT - HISTORY OF PRESENT ILLNESS
78 y/o F w/ PMH of behcet's syndorome, asthmatic bronchitis, CAD s/p PCI, chronic diastolic CHF, CVA, DVT on Eliquis, HTN, dyslipidemia, lumbago, epilepsy, scoliosis, p/w cough and wheezing. Son at bedside provides history, and states patient has been coughing and has been having SOB. Also states that patient c/o HA from the cough. +Generalized weakness. Patient to be flu positive in ED.     PSH: hysterectomy     Social Hx: No smoking / etoh / drugs     Family Hx: No known pertinent family hx

## 2024-03-07 ENCOUNTER — TRANSCRIPTION ENCOUNTER (OUTPATIENT)
Age: 79
End: 2024-03-07

## 2024-03-07 VITALS — WEIGHT: 174.17 LBS

## 2024-03-07 LAB
ANION GAP SERPL CALC-SCNC: 6 MMOL/L — SIGNIFICANT CHANGE UP (ref 5–17)
BUN SERPL-MCNC: 22 MG/DL — SIGNIFICANT CHANGE UP (ref 7–23)
CALCIUM SERPL-MCNC: 8.6 MG/DL — SIGNIFICANT CHANGE UP (ref 8.5–10.1)
CHLORIDE SERPL-SCNC: 115 MMOL/L — HIGH (ref 96–108)
CO2 SERPL-SCNC: 22 MMOL/L — SIGNIFICANT CHANGE UP (ref 22–31)
CREAT SERPL-MCNC: 0.83 MG/DL — SIGNIFICANT CHANGE UP (ref 0.5–1.3)
CULTURE RESULTS: SIGNIFICANT CHANGE UP
EGFR: 72 ML/MIN/1.73M2 — SIGNIFICANT CHANGE UP
GLUCOSE SERPL-MCNC: 164 MG/DL — HIGH (ref 70–99)
HCT VFR BLD CALC: 33.1 % — LOW (ref 34.5–45)
HGB BLD-MCNC: 10 G/DL — LOW (ref 11.5–15.5)
MCHC RBC-ENTMCNC: 19.8 PG — LOW (ref 27–34)
MCHC RBC-ENTMCNC: 30.2 GM/DL — LOW (ref 32–36)
MCV RBC AUTO: 65.5 FL — LOW (ref 80–100)
PLATELET # BLD AUTO: 186 K/UL — SIGNIFICANT CHANGE UP (ref 150–400)
POTASSIUM SERPL-MCNC: 3.8 MMOL/L — SIGNIFICANT CHANGE UP (ref 3.5–5.3)
POTASSIUM SERPL-SCNC: 3.8 MMOL/L — SIGNIFICANT CHANGE UP (ref 3.5–5.3)
RBC # BLD: 5.05 M/UL — SIGNIFICANT CHANGE UP (ref 3.8–5.2)
RBC # FLD: 16.1 % — HIGH (ref 10.3–14.5)
SODIUM SERPL-SCNC: 143 MMOL/L — SIGNIFICANT CHANGE UP (ref 135–145)
SPECIMEN SOURCE: SIGNIFICANT CHANGE UP
WBC # BLD: 11.49 K/UL — HIGH (ref 3.8–10.5)
WBC # FLD AUTO: 11.49 K/UL — HIGH (ref 3.8–10.5)

## 2024-03-07 PROCEDURE — 99239 HOSP IP/OBS DSCHRG MGMT >30: CPT

## 2024-03-07 PROCEDURE — 99233 SBSQ HOSP IP/OBS HIGH 50: CPT

## 2024-03-07 RX ORDER — IPRATROPIUM/ALBUTEROL SULFATE 18-103MCG
3 AEROSOL WITH ADAPTER (GRAM) INHALATION
Qty: 90 | Refills: 0
Start: 2024-03-07 | End: 2024-04-05

## 2024-03-07 RX ORDER — LEVOFLOXACIN 5 MG/ML
1 INJECTION, SOLUTION INTRAVENOUS
Qty: 3 | Refills: 0
Start: 2024-03-07 | End: 2024-03-09

## 2024-03-07 RX ADMIN — Medication 75 MILLIGRAM(S): at 10:06

## 2024-03-07 RX ADMIN — Medication 40 MILLIGRAM(S): at 05:52

## 2024-03-07 RX ADMIN — Medication 3 MILLILITER(S): at 15:56

## 2024-03-07 RX ADMIN — Medication 25 MILLIGRAM(S): at 05:52

## 2024-03-07 RX ADMIN — Medication 40 MILLIGRAM(S): at 12:06

## 2024-03-07 RX ADMIN — Medication 3 MILLILITER(S): at 04:51

## 2024-03-07 RX ADMIN — Medication 0.5 MILLIGRAM(S): at 07:36

## 2024-03-07 RX ADMIN — Medication 25 MILLIGRAM(S): at 15:25

## 2024-03-07 RX ADMIN — LOSARTAN POTASSIUM 50 MILLIGRAM(S): 100 TABLET, FILM COATED ORAL at 10:06

## 2024-03-07 RX ADMIN — Medication 20 MILLIGRAM(S): at 05:52

## 2024-03-07 RX ADMIN — Medication 3 MILLILITER(S): at 07:33

## 2024-03-07 RX ADMIN — Medication 3 MILLILITER(S): at 10:37

## 2024-03-07 RX ADMIN — APIXABAN 5 MILLIGRAM(S): 2.5 TABLET, FILM COATED ORAL at 10:08

## 2024-03-07 RX ADMIN — Medication 81 MILLIGRAM(S): at 10:06

## 2024-03-07 NOTE — DIETITIAN INITIAL EVALUATION ADULT - ADD RECOMMEND
Maintain DASH diet  Add ONS if pt's po intake falls below 75% ENN   Record PO intake in EMR after each meal (nursing.)   MVI w/ minerals daily to ensure 100% RDA met   Monitor bowel movements, if no BM for >3 days, consider implementing bowel regimen.

## 2024-03-07 NOTE — PROGRESS NOTE ADULT - SUBJECTIVE AND OBJECTIVE BOX
Allen Albert MD  [facility]  Contact via Teams or text/call at [phone]    Patient is a 79y old  Female who presents with a chief complaint of cough and wheezing (07 Mar 2024 10:29)    Feels better.  Less shortness of breath.      Patient seen and examined at bedside. No overnight events reported.     ALLERGIES:  penicillin (Rash)  meropenem (Short breath)    MEDICATIONS  (STANDING):  albuterol/ipratropium for Nebulization 3 milliLiter(s) Nebulizer every 4 hours  apixaban 5 milliGRAM(s) Oral every 12 hours  aspirin enteric coated 81 milliGRAM(s) Oral daily  atorvastatin 40 milliGRAM(s) Oral at bedtime  buDESOnide    Inhalation Suspension 0.5 milliGRAM(s) Inhalation every 12 hours  furosemide    Tablet 20 milliGRAM(s) Oral daily  levoFLOXacin IVPB 750 milliGRAM(s) IV Intermittent every 24 hours  losartan 50 milliGRAM(s) Oral daily  methylPREDNISolone sodium succinate Injectable 40 milliGRAM(s) IV Push every 6 hours  metoprolol tartrate 25 milliGRAM(s) Oral three times a day  oseltamivir 75 milliGRAM(s) Oral two times a day    MEDICATIONS  (PRN):  acetaminophen     Tablet .. 650 milliGRAM(s) Oral every 6 hours PRN Temp greater or equal to 38C (100.4F), Mild Pain (1 - 3)  cyclobenzaprine 5 milliGRAM(s) Oral daily PRN Muscle Spasm    Vital Signs Last 24 Hrs  T(F): 98.4 (07 Mar 2024 08:59), Max: 99.4 (06 Mar 2024 14:07)  HR: 82 (07 Mar 2024 10:40) (57 - 86)  BP: 137/62 (07 Mar 2024 08:59) (121/52 - 137/62)  RR: 19 (07 Mar 2024 08:59) (18 - 20)  SpO2: 96% (07 Mar 2024 10:40) (96% - 100%)  I&O's Summary    PHYSICAL EXAM:  General: NAD, A/O x 3  ENT: No gross hearing impairment, Moist mucous membranes, no thrush  Neck: Supple, No JVD  Lungs: Clear to auscultation bilaterally, good air entry, non-labored breathing, no wheezes  Cardio: RRR, S1/S2, No murmur  Abdomen: Soft, Nontender, Nondistended; Bowel sounds present  Extremities: No calf tenderness, No cyanosis, No pitting edema  Psych: Appropriate mood and affect    LABS:                        10.0   11.49 )-----------( 186      ( 07 Mar 2024 06:30 )             33.1     03-07    143  |  115  |  22  ----------------------------<  164  3.8   |  22  |  0.83    Ca    8.6      07 Mar 2024 06:30    TPro  6.0  /  Alb  2.8  /  TBili  0.4  /  DBili  x   /  AST  11  /  ALT  11  /  AlkPhos  68  03-06          PT/INR - ( 06 Mar 2024 07:08 )   PT: 11.8 sec;   INR: 1.05 ratio         PTT - ( 06 Mar 2024 07:08 )  PTT:29.9 sec  Lactate, Blood: 1.4 mmol/L (03-05 @ 16:29)      CARDIAC MARKERS ( 05 Mar 2024 16:29 )  x     / 28.75 ng/L / x     / x     / x                ABG - ( 06 Mar 2024 07:51 )  pH, Arterial: 7.42  pH, Blood: x     /  pCO2: 32    /  pO2: 66    / HCO3: 21    / Base Excess: -2.8  /  SaO2: 95        Urinalysis Basic - ( 07 Mar 2024 06:30 )    Color: x / Appearance: x / SG: x / pH: x  Gluc: 164 mg/dL / Ketone: x  / Bili: x / Urobili: x   Blood: x / Protein: x / Nitrite: x   Leuk Esterase: x / RBC: x / WBC x   Sq Epi: x / Non Sq Epi: x / Bacteria: x        Culture - Urine (collected 05 Mar 2024 16:42)  Source: Clean Catch None  Final Report (07 Mar 2024 08:08):    <10,000 CFU/mL Normal Urogenital Nimo    Culture - Blood (collected 05 Mar 2024 16:29)  Source: .Blood None  Preliminary Report (06 Mar 2024 20:01):    No growth at 24 hours    Culture - Blood (collected 05 Mar 2024 16:29)  Source: .Blood None  Preliminary Report (06 Mar 2024 20:01):    No growth at 24 hours        RADIOLOGY & ADDITIONAL TESTS:    Care Discussed with Consultants/Other Providers:

## 2024-03-07 NOTE — DIETITIAN NUTRITION RISK NOTIFICATION - TREATMENT: THE FOLLOWING DIET HAS BEEN RECOMMENDED
Diet, Regular:   DASH/TLC {Sodium & Cholesterol Restricted} (DASH) (03-06-24 @ 04:24) [Active]

## 2024-03-07 NOTE — DISCHARGE NOTE PROVIDER - HOSPITAL COURSE
Hospital Course  HPI:  80 y/o F w/ PMH of behcet's syndorome, asthmatic bronchitis, CAD s/p PCI, chronic diastolic CHF, CVA, DVT on Eliquis, HTN, dyslipidemia, lumbago, epilepsy, scoliosis, p/w cough and wheezing. Son at bedside provides history, and states patient has been coughing and has been having SOB. Also states that patient c/o HA from the cough. +Generalized weakness. Patient to be flu positive in ED.     PSH: hysterectomy     Social Hx: No smoking / etoh / drugs     Family Hx: No known pertinent family hx  (06 Mar 2024 03:40)    You were admitted for asthma exacerbation secondary to influenza A.    You were treated with IV steroids, antibiotics and antiviral medication.      You were prescribed the following new medications:  Tamiflu 75 mg twice a day for 3 more days  Steroids 20 mg PO daily for 3 more days  Levaquin 500 mg PO daily for 3 more days    You will need to follow up with your primary care physician.    Discharging Provider:  Allen Albert MD  Contact Info: [phone] - Please call with any questions or concerns.

## 2024-03-07 NOTE — DIETITIAN INITIAL EVALUATION ADULT - ORAL INTAKE PTA/DIET HISTORY
Pt lives with her son and daughter, and her granddaughter. They all shops and cook for her. Pt watches her sodium intake.  The food she eats is all made  fresh, they get no food from outside.   Via her granddaughter, she eats 2 meals a day.  For the past 5-6 months she reports that her appetite  has decreased, she had become "lazy" physically, and her appetite as a result decreased.  PO intake estimated < 75% ENN > one month.

## 2024-03-07 NOTE — DISCHARGE NOTE PROVIDER - DETAILS OF MALNUTRITION DIAGNOSIS/DIAGNOSES
This patient has been assessed with a concern for Malnutrition and was treated during this hospitalization for the following Nutrition diagnosis/diagnoses:     -  03/07/2024: Moderate protein-calorie malnutrition

## 2024-03-07 NOTE — DISCHARGE NOTE NURSING/CASE MANAGEMENT/SOCIAL WORK - NSDCPEFALRISK_GEN_ALL_CORE
For information on Fall & Injury Prevention, visit: [email or web address] OR  [email or web address] OR  https://www.cdc.gov/steadi/patient.html

## 2024-03-07 NOTE — DIETITIAN NUTRITION RISK NOTIFICATION - ADDITIONAL COMMENTS/DIETITIAN RECOMMENDATIONS
Maintain DASH diet  Add ONS if pt's po intake falls below 75% ENN   Record PO intake in EMR after each meal (nursing.)   MVI w/ minerals daily to ensure 100% RDA met   Monitor bowel movements, if no BM for >3 days, consider implementing bowel regimen.  Monitor PO intake, tolerance, labs and weight.

## 2024-03-07 NOTE — DISCHARGE NOTE NURSING/CASE MANAGEMENT/SOCIAL WORK - NSDCFUADDAPPT_GEN_ALL_CORE_FT
Wander Davidson [phone] Wander Davidson [phone]    Dr. Manzano  Monday, March 18, 2024 at 10:00 a.m.

## 2024-03-07 NOTE — PROGRESS NOTE ADULT - SUBJECTIVE AND OBJECTIVE BOX
Cross coverage for Dr. Leach    Subjective:   patient seen and examined, Granddaughter at bedside.     C/o dry cough no expectoration, no hemoptysis    ROS:  Gen: Denies fever, chills, rigors  HEENT: Denies neck swelling, difficulty swallowing, nasal congestion  PULM: As above  Cardiology: Denies palpitation, dizziness,         GENERAL APPEARANCE:  Pt. is not currently dyspneic, in no distress. Pt. is alert and pleasant. obese  HEENT:   No icterus. Mucous membranes moist,   NECK:  Supple.  Jugular venous pressure not elevated.   HEART:    Regular. Normal S1 and S2. There are no murmurs, rubs or gallops appreciated  CHEST:  clear to auscultation b/l no rales rhochi or wheezes.   EXTREMITIES:  There is no cyanosis, clubbing      Vital Signs Last 24 Hrs  T(C): 36.9 (07 Mar 2024 08:59), Max: 37.4 (06 Mar 2024 14:07)  T(F): 98.4 (07 Mar 2024 08:59), Max: 99.4 (06 Mar 2024 14:07)  HR: 86 (07 Mar 2024 08:59) (57 - 86)  BP: 137/62 (07 Mar 2024 08:59) (121/52 - 137/62)  RR: 19 (07 Mar 2024 08:59) (18 - 20)  SpO2: 100% (07 Mar 2024 08:59) (96% - 100%)  Parameters below as of 07 Mar 2024 08:59  Patient On (Oxygen Delivery Method): nasal cannula  O2 Flow (L/min): 2    Allergies    penicillin (Rash)  meropenem (Short breath)    Intolerances      LABS:                        10.0   11.49 )-----------( 186      ( 07 Mar 2024 06:30 )             33.1     03-07    143  |  115<H>  |  22  ----------------------------<  164<H>  3.8   |  22  |  0.83    Ca    8.6      07 Mar 2024 06:30    TPro  6.0  /  Alb  2.8<L>  /  TBili  0.4  /  DBili  x   /  AST  11<L>  /  ALT  11<L>  /  AlkPhos  68  03-06    PT/INR - ( 06 Mar 2024 07:08 )   PT: 11.8 sec;   INR: 1.05 ratio         PTT - ( 06 Mar 2024 07:08 )  PTT:29.9 sec    RADIOLOGY & ADDITIONAL TESTS (images reviewed):    < from: CT Angio Chest PE Protocol w/ IV Cont (03.05.24 @ 18:18) >    ACC: [id number] EXAM:  CT ANGIO CHEST PULM ART [facility]   ORDERED BY:   TRACI CABRERA     PROCEDURE DATE:  03/05/2024          INTERPRETATION:  CLINICAL INDICATION: History of DVT with shortness of   breath and tachycardia, rule out pulmonary embolism    TECHNIQUE: A volumetric acquisition of the chest was obtained from the   thoracic inlet to the upper abdomen during dynamic administration of 70cc   intravenous contrast according to the PE protocol. 3D MIP images were   provided.    COMPARISON: Chest CT 6/17/2018. Chest radiographs 1/3/2024    FINDINGS:  CTA: The study is technically adequate with a good contrast bolus to the   pulmonary arteries. No pulmonary embolism. The great vessels are normal   in size. Coronary artery calcification with circumflex artery stenting.   The heart is normal in size. Question apical myocardial thickening. No   pericardial effusion.    Lungs/Airways/Pleura: The central airways are patent. No pleural   effusion. Complete right middle lobe atelectasis, as seen on the January 2024 chest radiograph, and new from 2021. Right lower lobe calcified   granuloma.    Mediastinum/Lymph nodes: Small calcified left hilar nodes are likely   sequela of old granulomatous disease. Small hiatal hernia.    Upper Abdomen: Calcified hepatic granulomas.    Bones and Soft Tissues: No aggressive osseous lesions.    IMPRESSION:  No pulmonary embolism.    Complete right middle lobe collapse, present since January 2024 chest   radiograph and new from 2021. Consider direct visualization to exclude an   endobronchial lesion.    --- End of Report ---            BRIANNA FABIAN M.D., Attending Radiologist  This document has been electronically signed. Mar  5 2024  7:14PM    < end of copied text >    MEDICATIONS  (STANDING):  albuterol/ipratropium for Nebulization 3 milliLiter(s) Nebulizer every 4 hours  apixaban 5 milliGRAM(s) Oral every 12 hours  aspirin enteric coated 81 milliGRAM(s) Oral daily  atorvastatin 40 milliGRAM(s) Oral at bedtime  buDESOnide    Inhalation Suspension 0.5 milliGRAM(s) Inhalation every 12 hours  furosemide    Tablet 20 milliGRAM(s) Oral daily  levoFLOXacin IVPB 750 milliGRAM(s) IV Intermittent every 24 hours  losartan 50 milliGRAM(s) Oral daily  methylPREDNISolone sodium succinate Injectable 40 milliGRAM(s) IV Push every 6 hours  metoprolol tartrate 25 milliGRAM(s) Oral three times a day  oseltamivir 75 milliGRAM(s) Oral two times a day    MEDICATIONS  (PRN):  acetaminophen     Tablet .. 650 milliGRAM(s) Oral every 6 hours PRN Temp greater or equal to 38C (100.4F), Mild Pain (1 - 3)  cyclobenzaprine 5 milliGRAM(s) Oral daily PRN Muscle Spasm      Impression:    78 y/o F w/ PMH of behcet's syndrome, asthmatic bronchitis, CAD s/p PCI, chronic diastolic CHF, CVA, DVT on Eliquis, HTN, dyslipidemia, lumbago, epilepsy, scoliosis, p/w cough and wheezing. She was found to have influenza respiratory infection. She is requiring supplemental oxygen. She feels that her symptoms have improved significantly.     - Influenza LRTI  - Right middle lobe atelectasis (chronic at least since Jan 2024)  - RLL calcified granulmoa    Recommendations:    influenza:  -- Clinically much improved from influenza perspective.  -- ambulatory pulse ox. If she doesn't require oxygen, potentially can be d/jamal    RML atelectasis:    -- Unclear etiology she is a never smoker, no history of chronic lung disease  -- potentially could be related to mucous plug but endobronchial lesions can't be ruled out.  -- I discussed at length the role of endoscopy. Her grand-daughter is a nursing student and she explained it her in detail. I discussed risks benefits and alternates. For now alternative modes such as chest PT/ incentive spirometry etc to mobilize secretion.  -- Discussed that she needs a repeat CXR in about 1-2 weeks. She will discuss that with PCP.  -- All questions answered.

## 2024-03-07 NOTE — DIETITIAN INITIAL EVALUATION ADULT - PERTINENT LABORATORY DATA
03-07    143  |  115<H>  |  22  ----------------------------<  164<H>  3.8   |  22  |  0.83    Ca    8.6      07 Mar 2024 06:30    TPro  6.0  /  Alb  2.8<L>  /  TBili  0.4  /  DBili  x   /  AST  11<L>  /  ALT  11<L>  /  AlkPhos  68  03-06  A1C with Estimated Average Glucose Result: 6.4 % (06-27-23 @ 07:38)

## 2024-03-07 NOTE — DISCHARGE NOTE NURSING/CASE MANAGEMENT/SOCIAL WORK - PATIENT PORTAL LINK FT
You can access the FollowMyHealth Patient Portal offered by [facility] by registering at the following website: [email or web address]. By joining "[employer]’s FollowMyHealth portal, you will also be able to view your health information using other applications (apps) compatible with our system.

## 2024-03-07 NOTE — DIETITIAN INITIAL EVALUATION ADULT - NAME AND PHONE
Seda Hernández RDN, CDN, [facility]      [phone]   [email or web address]

## 2024-03-07 NOTE — DISCHARGE NOTE PROVIDER - NSDCMRMEDTOKEN_GEN_ALL_CORE_FT
aspirin 81 mg oral delayed release tablet: 1 tab(s) orally once a day  atorvastatin 40 mg oral tablet: 1 tab(s) orally once a day (at bedtime)  cyclobenzaprine 5 mg oral tablet: 1 tab(s) orally once a day as needed for  muscle spasm  Eliquis 2.5 mg oral tablet: 1 tab(s) orally 2 times a day  ipratropium-albuterol 0.5 mg-2.5 mg/3 mL inhalation solution: 3 milliliter(s) by nebulizer 3 times a day as needed for  shortness of breath and/or wheezing  Lasix 20 mg oral tablet: 1 tab(s) orally once a day  levoFLOXacin 250 mg oral tablet: 1 tab(s) orally once a day  losartan 50 mg oral tablet: 1 tab(s) orally once a day (in the morning)  metoprolol tartrate 25 mg oral tablet: 1 tab(s) orally 3 times a day  oseltamivir 75 mg oral capsule: 1 cap(s) orally 2 times a day   no

## 2024-03-07 NOTE — PROGRESS NOTE ADULT - SUBJECTIVE AND OBJECTIVE BOX
Date of service: 03-07-24 @ 12:09    pt seen and examined  feels better  no distress  afebrile    ROS: no fever or chills; denies dizziness, no HA, no abdominal pain, no diarrhea or constipation; no dysuria, no urinary frequency, no legs pain, no rashes    MEDICATIONS  (STANDING):  albuterol/ipratropium for Nebulization 3 milliLiter(s) Nebulizer every 4 hours  apixaban 5 milliGRAM(s) Oral every 12 hours  aspirin enteric coated 81 milliGRAM(s) Oral daily  atorvastatin 40 milliGRAM(s) Oral at bedtime  buDESOnide    Inhalation Suspension 0.5 milliGRAM(s) Inhalation every 12 hours  furosemide    Tablet 20 milliGRAM(s) Oral daily  levoFLOXacin IVPB 750 milliGRAM(s) IV Intermittent every 24 hours  losartan 50 milliGRAM(s) Oral daily  methylPREDNISolone sodium succinate Injectable 40 milliGRAM(s) IV Push every 6 hours  metoprolol tartrate 25 milliGRAM(s) Oral three times a day  oseltamivir 75 milliGRAM(s) Oral two times a day      Vital Signs Last 24 Hrs  T(C): 36.9 (07 Mar 2024 08:59), Max: 37.4 (06 Mar 2024 14:07)  T(F): 98.4 (07 Mar 2024 08:59), Max: 99.4 (06 Mar 2024 14:07)  HR: 82 (07 Mar 2024 10:40) (57 - 86)  BP: 137/62 (07 Mar 2024 08:59) (121/52 - 137/62)  BP(mean): --  RR: 19 (07 Mar 2024 08:59) (18 - 20)  SpO2: 96% (07 Mar 2024 10:40) (96% - 100%)    Parameters below as of 07 Mar 2024 10:40  Patient On (Oxygen Delivery Method): room air      PE:  Constitutional: NAD   HEENT: NC/AT, EOMI, PERRLA, conjunctivae clear; ears and nose atraumatic; pharynx benign  Neck: supple; thyroid not palpable  Back: no tenderness  Respiratory: decreased breath sounds, rhonchi   Cardiovascular: S1S2 regular, no murmurs  Abdomen: soft, not tender, not distended, positive BS; liver and spleen WNL  Genitourinary: no suprapubic tenderness  Lymphatic: no LN palpable  Musculoskeletal: no muscle tenderness, no joint swelling or tenderness  Extremities: no pedal edema  Neurological/ Psychiatric: AxOx3, Judgement and insight normal;  moving all extremities  Skin: no rashes; no palpable lesions    Labs: all available labs reviewed                                   10.0   11.49 )-----------( 186      ( 07 Mar 2024 06:30 )             33.1     03-07    143  |  115<H>  |  22  ----------------------------<  164<H>  3.8   |  22  |  0.83    Ca    8.6      07 Mar 2024 06:30    TPro  6.0  /  Alb  2.8<L>  /  TBili  0.4  /  DBili  x   /  AST  11<L>  /  ALT  11<L>  /  AlkPhos  68  03-06         LIVER FUNCTIONS - ( 06 Mar 2024 07:08 )  Alb: 2.8 g/dL / Pro: 6.0 gm/dL / ALK PHOS: 68 U/L / ALT: 11 U/L / AST: 11 U/L / GGT: x           Urinalysis Basic - ( 06 Mar 2024 07:08 )    Color: x / Appearance: x / SG: x / pH: x  Gluc: 209 mg/dL / Ketone: x  / Bili: x / Urobili: x   Blood: x / Protein: x / Nitrite: x   Leuk Esterase: x / RBC: x / WBC x   Sq Epi: x / Non Sq Epi: x / Bacteria: x    Culture - Urine (03.05.24 @ 16:42)   Specimen Source: Clean Catch None  Culture Results:   <10,000 CFU/mL Normal Urogenital Nimo  Culture - Blood (03.05.24 @ 16:29)   Specimen Source: .Blood None  Culture Results:   No growth at 24 hours    Radiology: all available radiological tests reviewed    ACC: [id number] EXAM:  CT ANGIO CHEST PULM ART [facility]   ORDERED BY:   TRACI CABRERA     PROCEDURE DATE:  03/05/2024          INTERPRETATION:  CLINICAL INDICATION: History of DVT with shortness of   breath and tachycardia, rule out pulmonary embolism    TECHNIQUE: A volumetric acquisition of the chest was obtained from the   thoracic inlet to the upper abdomen during dynamic administration of 70cc   intravenous contrast according to the PE protocol. 3D MIP images were   provided.    COMPARISON: Chest CT 6/17/2018. Chest radiographs 1/3/2024    FINDINGS:  CTA: The study is technically adequate with a good contrast bolus to the   pulmonary arteries. No pulmonary embolism. The great vessels are normal   in size. Coronary artery calcification with circumflex artery stenting.   The heart is normal in size. Question apical myocardial thickening. No   pericardial effusion.    Lungs/Airways/Pleura: The central airways are patent. No pleural   effusion. Complete right middle lobe atelectasis, as seen on the January 2024 chest radiograph, and new from 2021. Right lower lobe calcified   granuloma.    Mediastinum/Lymph nodes: Small calcified left hilar nodes are likely   sequela of old granulomatous disease. Small hiatal hernia.    Upper Abdomen: Calcified hepatic granulomas.    Bones and Soft Tissues: No aggressive osseous lesions.    IMPRESSION:  No pulmonary embolism.    Complete right middle lobe collapse, present since January 2024 chest   radiograph and new from 2021. Consider direct visualization to exclude an   endobronchial lesion.      < end of copied text >    Advanced directives addressed: full resuscitation

## 2024-03-07 NOTE — PROGRESS NOTE ADULT - ASSESSMENT
80 y/o F w/ PMH of behcet's syndorome, asthmatic bronchitis, CAD s/p PCI, chronic diastolic CHF, CVA, DVT on Eliquis, HTN, dyslipidemia, lumbago, epilepsy, scoliosis, p/w cough and wheezing. Son at bedside provides history, and states patient has been coughing and has been having SOB. Also states that patient c/o HA from the cough. +Generalized weakness. Patient to be flu positive in ED. RVP positive for influenza. Imaging shows No pulmonary embolism. Complete right middle lobe collapse, present since January 2024 chest  radiograph and new from 2021. Consider direct visualization to exclude an endobronchial lesion. Started on IV levaquin and tamiflu.     1. Viral syndrome. Influenza. RML atelectasis/lung collapse. Asthma exacerbation  - imaging reviewed  - on levaquin [id number] #[id number]   - on steroids for asthma  - on tamiflu 75mg BID complete 5 day course  - monitor temps  - tolerating abx well so far; no side effects noted  - reason for abx use and side effects reviewed with patient  - supportive care  - isolation precautions  - fu cbc    Clinical team may change from intravenous to oral antibiotics when the following criteria are met:   1. Patient is clinically improving/stable       a)	Improved signs and symptoms of infection from initial presentation       b)	Afebrile for 24 hours       c)	Leukocytosis trending towards normal range   2. Patient is tolerating oral intake   3. Initial blood cultures are negative     When above criteria met may change iv antibiotics to: po levaquin [id number] x 7 day course    
  78 y/o F w/ PMH of behcet's syndorome, asthmatic bronchitis, CAD s/p PCI, chronic diastolic CHF, CVA, DVT on Eliquis, HTN, dyslipidemia, lumbago, epilepsy, scoliosis, p/w cough and wheezing    Influenza A  Asthma exacerbation  - no wheezes, feels better  - cont tamiflu  - cont levaquin change to PO  - cont steroids    H/o behcet's syndrome  / CAD / CVA / DVT / HTN / dyslipidemia / epilepsy  - C/w home meds and f/u outpatient for further management if conditions remain stable during hospitalization    DVT ppx  - On Eliquis     d/c home today

## 2024-03-07 NOTE — DISCHARGE NOTE PROVIDER - NSDCCPCAREPLAN_GEN_ALL_CORE_FT
PRINCIPAL DISCHARGE DIAGNOSIS  Diagnosis: Influenza A  Assessment and Plan of Treatment: You were admitted for asthma exacerbation secondary to influenza A.  You were treated with IV steroids, antibiotics and antiviral medication.    You were prescribed the following new medications:  Tamiflu 75 mg twice a day for 3 more days  Steroids 20 mg PO daily for 3 more days  Levaquin 500 mg PO daily for 3 more days  You will need to follow up with your primary care physician.

## 2024-03-08 ENCOUNTER — APPOINTMENT (OUTPATIENT)
Dept: HOME HEALTH SERVICES | Facility: HOME HEALTH | Age: 79
End: 2024-03-08

## 2024-03-08 VITALS
TEMPERATURE: 98.2 F | HEART RATE: 60 BPM | OXYGEN SATURATION: 98 % | RESPIRATION RATE: 16 BRPM | SYSTOLIC BLOOD PRESSURE: 138 MMHG | DIASTOLIC BLOOD PRESSURE: 84 MMHG

## 2024-03-08 DIAGNOSIS — R05.9 COUGH, UNSPECIFIED: ICD-10-CM

## 2024-03-08 DIAGNOSIS — R06.2 WHEEZING: ICD-10-CM

## 2024-03-08 RX ORDER — APIXABAN 2.5 MG/1
2.5 TABLET, FILM COATED ORAL
Qty: 3 | Refills: 3 | Status: ACTIVE | COMMUNITY
Start: 2024-03-08

## 2024-03-08 RX ORDER — OSELTAMIVIR PHOSPHATE 75 MG/1
75 CAPSULE ORAL TWICE DAILY
Refills: 0 | Status: ACTIVE | COMMUNITY
Start: 2024-03-08

## 2024-03-08 RX ORDER — BENZONATATE 100 MG/1
100 CAPSULE ORAL
Qty: 21 | Refills: 0 | Status: ACTIVE | COMMUNITY
Start: 2024-03-08

## 2024-03-08 RX ORDER — IPRATROPIUM BROMIDE AND ALBUTEROL SULFATE 2.5; .5 MG/3ML; MG/3ML
0.5-2.5 (3) SOLUTION RESPIRATORY (INHALATION)
Qty: 1 | Refills: 3 | Status: ACTIVE | COMMUNITY
Start: 2024-03-08

## 2024-03-10 LAB
CULTURE RESULTS: SIGNIFICANT CHANGE UP
CULTURE RESULTS: SIGNIFICANT CHANGE UP
SPECIMEN SOURCE: SIGNIFICANT CHANGE UP
SPECIMEN SOURCE: SIGNIFICANT CHANGE UP

## 2024-03-15 ENCOUNTER — APPOINTMENT (OUTPATIENT)
Dept: HOME HEALTH SERVICES | Facility: HOME HEALTH | Age: 79
End: 2024-03-15
Payer: MEDICARE

## 2024-03-15 VITALS
OXYGEN SATURATION: 97 % | BODY MASS INDEX: 38.78 KG/M2 | DIASTOLIC BLOOD PRESSURE: 61 MMHG | HEART RATE: 52 BPM | SYSTOLIC BLOOD PRESSURE: 143 MMHG | RESPIRATION RATE: 16 BRPM | WEIGHT: 212 LBS

## 2024-03-15 DIAGNOSIS — R09.89 OTHER SPECIFIED SYMPTOMS AND SIGNS INVOLVING THE CIRCULATORY AND RESPIRATORY SYSTEMS: ICD-10-CM

## 2024-03-15 DIAGNOSIS — R00.1 BRADYCARDIA, UNSPECIFIED: ICD-10-CM

## 2024-03-15 DIAGNOSIS — I10 ESSENTIAL (PRIMARY) HYPERTENSION: ICD-10-CM

## 2024-03-15 DIAGNOSIS — I50.32 CHRONIC DIASTOLIC (CONGESTIVE) HEART FAILURE: ICD-10-CM

## 2024-03-15 DIAGNOSIS — J45.909 UNSPECIFIED ASTHMA, UNCOMPLICATED: ICD-10-CM

## 2024-03-15 DIAGNOSIS — I82.409 ACUTE EMBOLISM AND THROMBOSIS OF UNSPECIFIED DEEP VEINS OF UNSPECIFIED LOWER EXTREMITY: ICD-10-CM

## 2024-03-15 DIAGNOSIS — R60.0 LOCALIZED EDEMA: ICD-10-CM

## 2024-03-15 DIAGNOSIS — I49.3 VENTRICULAR PREMATURE DEPOLARIZATION: ICD-10-CM

## 2024-03-15 PROCEDURE — 99495 TRANSJ CARE MGMT MOD F2F 14D: CPT

## 2024-03-15 RX ORDER — LEVOFLOXACIN 250 MG/1
250 TABLET, FILM COATED ORAL
Refills: 0 | Status: DISCONTINUED | COMMUNITY
Start: 2024-03-08 | End: 2024-03-15

## 2024-03-15 RX ORDER — PREDNISONE 5 MG/1
5 TABLET ORAL
Qty: 35 | Refills: 0 | Status: ACTIVE | COMMUNITY
Start: 2024-03-15 | End: 1900-01-01

## 2024-03-15 RX ORDER — FUROSEMIDE 20 MG/1
20 TABLET ORAL
Qty: 30 | Refills: 2 | Status: ACTIVE | COMMUNITY
Start: 2024-03-15 | End: 1900-01-01

## 2024-03-15 RX ORDER — METOPROLOL TARTRATE 25 MG/1
25 TABLET, FILM COATED ORAL
Qty: 60 | Refills: 3 | Status: ACTIVE | COMMUNITY
Start: 2024-03-15 | End: 1900-01-01

## 2024-03-15 RX ORDER — GUAIFENESIN SYRUP AND DEXTROMETHORPHAN 100; 10 MG/5ML; MG/5ML
100-10 SYRUP ORAL EVERY 6 HOURS
Qty: 560 | Refills: 2 | Status: ACTIVE | COMMUNITY
Start: 2024-03-15 | End: 1900-01-01

## 2024-03-15 RX ORDER — METHYLPREDNISOLONE 4 MG/1
4 TABLET ORAL
Qty: 1 | Refills: 0 | Status: DISCONTINUED | COMMUNITY
Start: 2024-03-08 | End: 2024-03-15

## 2024-03-15 NOTE — REVIEW OF SYSTEMS
[Fatigue] : fatigue [Chest Pain] : no chest pain [Palpitations] : no palpitations [Leg Claudication] : no leg claudication [Lower Ext Edema] : lower extremity edema [Orthopnea] : orthopnea [Paroxysmal Nocturnal Dyspnea] : no paroxysmal nocturnal dyspnea [Shortness Of Breath] : shortness of breath [Wheezing] : no wheezing [Cough] : cough [Dyspnea on Exertion] : dyspnea on exertion [Abdominal Pain] : no abdominal pain [Nausea] : no nausea [Constipation] : no constipation [Diarrhea] : diarrhea [Vomiting] : no vomiting [Melena] : no melena [Dysuria] : no dysuria [Incontinence] : no incontinence [Nocturia] : no nocturia [Hematuria] : no hematuria [Frequency] : no frequency [Joint Pain] : no joint pain [Joint Stiffness] : no joint stiffness [Joint Swelling] : no joint swelling [Muscle Weakness] : no muscle weakness [Muscle Pain] : no muscle pain [Itching] : no itching [Back Pain] : no back pain [Headache] : no headache [Dizziness] : no dizziness [Fainting] : no fainting [Confusion] : no confusion [Memory Loss] : no memory loss [Unsteady Walking] : ataxia [Insomnia] : no insomnia [Anxiety] : no anxiety [Depression] : no depression [Negative] : Heme/Lymph

## 2024-03-15 NOTE — HEALTH RISK ASSESSMENT
[Some assistance needed] : walking [Independent] : using telephone [Full assistance needed] : managing medications [No falls in past year] : Patient reported no falls in the past year [No] : The patient does not have visual impairment [HRA Reviewed] : Health Risk Assessment reviewed

## 2024-03-15 NOTE — REASON FOR VISIT
[Post-hospitalization from ___ Hospital] : Post-hospitalization from [unfilled] Hospital [Admitted on: ___] : The patient was admitted on [unfilled] [Discharged on ___] : discharged on [unfilled] [Patient Contacted By: ____] : and contacted by [unfilled] [Intercurrent Specialty/Sub-specialty Visits] : The patient has intercurrent specialty/sub-specialty visits [Pre-Visit Preparation] : Pre-visit preparation was done [FreeTextEntry2] : Hospital Course: Discharge Date 07-Mar-2024 Admission Date 05-Mar-2024 20:16 Reason for Admission cough and wheezing   HPI: 80 y/o F w/ PMH of behcet's syndorome, asthmatic bronchitis, CAD s/p PCI, chronic diastolic CHF, CVA, DVT on Eliquis, HTN, dyslipidemia, lumbago, epilepsy, scoliosis, p/w cough and wheezing. Son at bedside provides history, and states patient has been coughing and has been having SOB. Also states that patient c/o HA from the cough. +Generalized weakness. Patient to be flu positive in ED. PSH: hysterectomy [FreeTextEntry4] : Chartr trudy completed [FreeTextEntry5] : Pulmonologost

## 2024-03-15 NOTE — CHRONIC CARE ASSESSMENT
[Non-communicative (PAINAD):] : Non-communicative (PAINAD): [Oriented To Person] : ~L oriented to person [Oriented To Place] : ~L oriented to place [Oriented To Time] : ~L oriented to time [Oriented To Situation] : ~L oriented to situation [Alert] : ~L alert [Reviewed Home Safety Evaluation] : Reviewed home safety evaluation [No Action Needed] : No action needed [de-identified] : none noted [PPS Score: ____] : Palliative Performance Scale (PPS) Score: [unfilled]

## 2024-03-15 NOTE — ASSESSMENT
[FreeTextEntry1] : PHILIPPE ÁLVAREZ is 80y/o female with PMHX of Behcet's syndrome, asthmatic bronchitis, CAD s/p PCI, chronic diastolic CHF, CVA, DVT on Eliquis, HTN, dyslipidemia, lumbago, epilepsy, scoliosis, is seen for a post-discharge follow-up. Son Mauricio was on the phone, and her grandson was present in person during the visit.   AOx3, denies CP or dizziness, any GI discomfort, endorses SOB on minimal exertion.  The patient reports having a difficult time bringing up phlegm. The patient also presents with swelling on hugo feet and ankles. Furosemide was discontinued at discharge and has been off for a week now.  [] asthmatic bronchitis/ chest congestion - acute on chronic, s/p influeza, completed Levaquin, Prednisone - wheezing on exam, + MONTILLA - c/w Duoneb,  - START guaifenesin 2mg Q6hrs PRN - START Prednisone 20mg 5 days, 10mg x 5day, then 5mg x 5days  [] bradycardia - asymptomatic. - discharged on metoprolol tartrate 25mg TID, spoke to SOLOMON Penaloza at Dr. Lynne ([facility]) office.; Patient has been in 40s before and asymptomatic. Metoprolol Tartrate changed to BID - Follow up with cardiologist on 3/18 @10am  [] CHF/ hugo LE edema - chronic - fluid overload on exam - Resume furosemide 20mg QD in AM  [] hypetension - chronic  - hypertensive on exam - continue Losartan, furosemide, metoprolol tartrate.    [] h/o DVT - continue eliquis, 2.5mg BID  Reviewed all medications at length with patient/caregiver. All questions addressed. Worsening symptoms were discussed with the patient /caregiver and verbalized understanding no issues or concerns at this time. The patient/caregiver is encouraged to call the [facility] 24-hour number with any questions, concerns, or issues.

## 2024-03-15 NOTE — HISTORY OF PRESENT ILLNESS
[Patient] : patient [Family Member] : family member [FreeTextEntry1] : Patient is not home-bound [FreeTextEntry2] : PHILIPPE ÁLVAREZ is 78y/o female with PMHX of Behcet's syndrome, asthmatic bronchitis, CAD s/p PCI, chronic diastolic CHF, CVA, DVT on Eliquis, HTN, dyslipidemia, lumbago, epilepsy, scoliosis, is seen for a post-discharge follow-up. Son Mauricio was on the phone, and her grandson was present in person during the visit.   AOx3, denies CP or dizziness, any GI discomfort, endorses SOB on minimal exertion.  The patient reports having a difficult time bringing up phlegm. The patient also presents with swelling on hugo feet and ankles. Furosemide was discontinued at discharge and has been off for a week now.

## 2024-03-15 NOTE — COUNSELING
[Obese (BMI >29.9)] : Obese - BMI >29.9 [DASH diet given] : DASH diet given [Sodium restriction 2gm recommended] : Sodium restriction 2 gm recommended [Non - Smoker] : non-smoker [Medical alert] : medical alert [Improve mobility] : improve mobility [Decrease stress] : decrease stress [Decrease hospital use] : decrease hospital use [Minimize unnecessary interventions] : minimize unnecessary interventions [Maintain functional ability] : maintain functional ability [Discussed disease trajectory with patient/caregiver] : discussed disease trajectory with patient/caregiver [Likely to achieve goals/desired outcomes] : likely to achieve goals/desired outcomes [Patient/Caregiver has ___ understanding of disease process] : patient/caregiver has [unfilled] understanding of disease process [Patient/Caregiver not ready to engage in discussion] : patient/caregiver not ready to engage in discussion [Full Code] : Code Status: Full Code

## 2024-03-15 NOTE — PHYSICAL EXAM
[No Acute Distress] : no acute distress [Normal Voice/Communication] : normal voice communication [Normal Sclera/Conjunctiva] : normal sclera/conjunctiva [PERRL] : pupils equal, round and reactive to light [EOMI] : extra ocular movement intact [Normal Outer Ear/Nose] : the ears and nose were normal in appearance [Normal Nasal Mucosa] : the nasal mucosa was normal [Supple] : the neck was supple [Thyroid Normal, No Nodules] : the thyroid was normal and there were no nodules present [No Respiratory Distress] : no respiratory distress [No Accessory Muscle Use] : no accessory muscle use [Normal Rate] : heart rate was normal  [Regular Rhythm] : with a regular rhythm [No Carotid Bruit] : No carotid bruit [Normal Bowel Sounds] : normal bowel sounds [Non Tender] : non-tender [Soft] : abdomen soft [Not Distended] : not distended [No CVA Tenderness] : no ~M costovertebral angle tenderness [No Spinal Tenderness] : no spinal tenderness [No Joint Swelling] : no joint swelling seen [No Clubbing, Cyanosis] : no clubbing  or cyanosis of the fingernails [Normal Strength/Tone] : muscle strength and tone were normal [No Rash] : no rash [No Skin Lesions] : no skin lesions [No Motor Deficits] : the motor exam was normal [No Gross Sensory Deficits] : no gross sensory deficits [Normal Reflexes] : deep tendon reflexes were 2+ and symmetric [Oriented x3] : oriented to person, place, and time [Normal Affect] : the affect was normal [Normal Mood] : the mood was normal [Normal Insight/Judgement] : insight and judgment were intact [de-identified] : Pleasant and cooperative obese elderly female looking older than stated age  [de-identified] : + wheezing throughout [de-identified] : 1+ pitting edema to hugo feet an ankles , hugo feet dusky

## 2024-03-31 NOTE — HEALTH RISK ASSESSMENT
[HRA Reviewed] : Health risk assessment reviewed [Independent] : using telephone [Some assistance needed] : managing medications [Full assistance needed] : managing finances [No falls in past year] : Patient reported no falls in the past year

## 2024-04-16 NOTE — DISCHARGE NOTE NURSING/CASE MANAGEMENT/SOCIAL WORK - NSPROEXTENSIONSOFSELF_GEN_A_NUR
JCM patient called has gotten the calls and letters from coumadin clinic  has other issues will try to have done next week   clothes/jacket/none

## 2024-04-27 NOTE — COUNSELING
[Obese -  ( BMI  >29.9 )] : obese - ( BMI  >29.9 ) [DASH diet recommended] : DASH diet recommended [Sodium restriction 2gm recommended] : sodium restriction 2 gm recommended [Continue diet as tolerated] : continue diet as tolerated based on goals of care [Smoke/CO Detectors] : smoke/CO detectors [Non - Smoker] : non-smoker [Improve exercise tolerance] : improve exercise tolerance [Improve mobility] : improve mobility [Decrease stress] : decrease stress [Decrease hospital use] : decrease hospital use [Minimize unnecessary interventions] : minimize unnecessary interventions [Maintain functional ability] : maintain functional ability [Discussed disease trajectory with patient/caregiver] : discussed disease trajectory with patient/caregiver [Likely to achieve goals/desired outcomes] : likely to achieve goals/desired outcomes [Patient/Caregiver has ___ understanding of disease process] : patient/caregiver has [unfilled] understanding of disease process [Patient/Caregiver not ready to engage in discussion] : patient/caregiver not ready to engage in discussion [Patient/Caregiver is unclear of wishes] : patient/caregiver is unclear of wishes [Completed Medical Orders for Life-Sustaining Treatment] : completed medical orders for life-sustaining treatment [Full Code] : Code Status: Full Code [ - New patient with 2 or more chronic conditions; CCM discussed and patient-centered care plan established] : New patient with 2 or more chronic conditions; CCM discussed and patient-centered care plan established

## 2024-04-28 PROBLEM — I25.10 CAD (CORONARY ARTERY DISEASE): Status: ACTIVE | Noted: 2024-04-28

## 2024-04-28 PROBLEM — I10 ESSENTIAL HYPERTENSION: Status: ACTIVE | Noted: 2024-04-28

## 2024-04-28 PROBLEM — M62.838 MUSCLE SPASM: Status: ACTIVE | Noted: 2024-04-28

## 2024-04-28 RX ORDER — ATORVASTATIN CALCIUM 40 MG/1
40 TABLET, FILM COATED ORAL
Qty: 30 | Refills: 3 | Status: ACTIVE | COMMUNITY
Start: 2024-04-28

## 2024-04-28 RX ORDER — CYCLOBENZAPRINE HYDROCHLORIDE 5 MG/1
5 TABLET, FILM COATED ORAL 3 TIMES DAILY
Refills: 0 | Status: ACTIVE | COMMUNITY
Start: 2024-04-28

## 2024-04-28 NOTE — PHYSICAL EXAM
[Normal Voice/Communication] : normal voice communication [No Acute Distress] : no acute distress [PERRL] : pupils equal, round and reactive to light [Normal Sclera/Conjunctiva] : normal sclera/conjunctiva [EOMI] : extra ocular movement intact [Normal Oropharynx] : the oropharynx was normal [Normal Outer Ear/Nose] : the ears and nose were normal in appearance [Supple] : the neck was supple [No Respiratory Distress] : no respiratory distress [Clear to Auscultation] : lungs were clear to auscultation bilaterally [Normal Rate] : heart rate was normal  [No Accessory Muscle Use] : no accessory muscle use [Normal S1, S2] : normal S1 and S2 [Regular Rhythm] : with a regular rhythm [Normal Bowel Sounds] : normal bowel sounds [Non Tender] : non-tender [Soft] : abdomen soft [Not Distended] : not distended [Normal Anterior Cervical Nodes] : no anterior cervical lymphadenopathy [Normal Post Cervical Nodes] : no posterior cervical lymphadenopathy [No CVA Tenderness] : no ~M costovertebral angle tenderness [No Spinal Tenderness] : no spinal tenderness [No Joint Swelling] : no joint swelling seen [No Clubbing, Cyanosis] : no clubbing  or cyanosis of the fingernails [Normal Strength/Tone] : muscle strength and tone were normal [No Rash] : no rash [Cranial Nerves Intact] : cranial nerves 2-12 were intact [No Gross Sensory Deficits] : no gross sensory deficits [Normal Reflexes] : deep tendon reflexes were 2+ and symmetric [Oriented x3] : oriented to person, place, and time [Normal Affect] : the affect was normal [Normal Mood] : the mood was normal [Normal Insight/Judgement] : insight and judgment were intact [de-identified] :  1+ pitting edema to hugo LE [de-identified] : pleasant, cooperative elderly female. soft voice [de-identified] : ataxic gait, ambulating with RW [de-identified] : mild forgetfulness

## 2024-04-28 NOTE — REASON FOR VISIT
[Initial Evaluation] : an initial evaluation [Pre-Visit Preparation] : pre-visit preparation was done [Family Member] : family member [Intercurrent Specialty/Sub-specialty Visits] : the patient has intercurrent specialty/sub-specialty visits [FreeTextEntry2] : Enrollment visit [FreeTextEntry3] : Cardiology,

## 2024-04-28 NOTE — HISTORY OF PRESENT ILLNESS
[[employer] Calls Co-Management Patient] : [unfilled] is a [employer] co-management patient [Family Member] : family member [In-Place] : has aide services in-place [Private Hire] : private hire [Patient is stable] : patient is stable [Education provided] : education provided [FreeTextEntry4] : Dr. Lynne [FreeTextEntry1] : Patient is not homebound. [FreeTextEntry2] : PHILIPPE ÁLVAREZ is 80y/o Croatian speaking female with PMHX of PMHx of CHF, HFpEF, CVA, Behcet's syndrome, CAD w/stent, DVT on Eliquis, HTN, lumbago.  Patient is being seen for an initial enrollment visit to [facility] programs.  SonMauricio was on the phone and daughter-in-law was accompanied in person during the visit.  Patient had LHC on 2/1 with revealing patent stent, non-obstructive CAD. discharged home.  AOx3, denies chest pain or dizziness + MONTILLA, when walking between livingroom, bathroom and bedroom. (about 15ft each way),  no acute complaints offered.      # Ambulation - ambulates with RW, + MONTILLA # Cognition and memory: patient alert & oriented. engaging, speech coherent # Appetite: Fair, reasonably compliant with low sodium diet # Sleep hygiene: reasonable no complaint, + nocturia # BM pattern: Intermittent constipation, but generally good. # Urinary: denies increased frequency, pain with urination, change to color or odor of urine # Sensory deficits: none  Review of systems otherwise NEGATIVE.

## 2024-04-28 NOTE — REVIEW OF SYSTEMS
[Fatigue] : fatigue [Lower Ext Edema] : lower extremity edema [Dyspnea on Exertion] : dyspnea on exertion [Back Pain] : back pain [Unsteady Walking] : ataxia [Negative] : Heme/Lymph [Chest Pain] : no chest pain [Palpitations] : no palpitations [Leg Claudication] : no leg claudication [Orthopnea] : no orthopnea [Paroxysmal Nocturnal Dyspnea] : no paroxysmal nocturnal dyspnea [Shortness Of Breath] : no shortness of breath [Wheezing] : no wheezing [Cough] : no cough [Abdominal Pain] : no abdominal pain [Nausea] : no nausea [Constipation] : no constipation [Diarrhea] : diarrhea [Vomiting] : no vomiting [Heartburn] : no heartburn [Melena] : no melena [Dysuria] : no dysuria [Incontinence] : no incontinence [Nocturia] : no nocturia [Hematuria] : no hematuria [Frequency] : no frequency [Joint Pain] : no joint pain [Joint Stiffness] : no joint stiffness [Joint Swelling] : no joint swelling [Muscle Weakness] : no muscle weakness [Itching] : no itching [Skin Rash] : no skin rash [Headache] : no headache [Dizziness] : no dizziness [Fainting] : no fainting [Memory Loss] : no memory loss [Insomnia] : no insomnia [Anxiety] : no anxiety [Depression] : no depression

## 2024-04-28 NOTE — CURRENT MEDS
[High Risk Medications Reviewed and Reconciled (Beers Criteria)] : high risk medications reviewed and reconciled [Medication and Allergies Reconciled] : medication and allergies reconciled [Reviewed patient reported medication adherence from Comprehensive Assessment] : Reviewed patient reported medication adherence from comprehensive assessment [Adherent to medications] : Patient is adherent to medications as prescribed [de-identified] : [facility] manages meds

## 2024-04-28 NOTE — CHRONIC CARE ASSESSMENT
[PPS Score: ____] : Palliative Performance Scale (PPS) Score: [unfilled] [de-identified] : activity as tolerated [de-identified] : low sodium diet

## 2024-04-28 NOTE — ASSESSMENT
[FreeTextEntry1] : PHILIPPE ÁLVAREZ is 78y/o Macedonian speaking female with PMHX of PMHx of CHF, HFpEF, CVA, Behcet's syndrome, CAD w/stent, DVT on Eliquis, HTN, lumbago.  Patient is being seen for an initial enrollment visit to [facility] programs.  SonMauricio was on the phone and daughter-in-law was accompanied in person during the visit.   [] Angina pectoris/CAD - subacute, stable - s/p cardiac stent, site healed. - continue Atorvastatin, Losartan, amlodipine and Toprol XL - Follow up with cardiologist  [] dCHF - chronic, stable, euvolemic on exam - continue ToprolXL, furosemide,  - low sodium DASH diet, <48oz fluid restriction - daily weight and BP  monitor - follow up with cardiologist   [] h/o DVT - chronic, stable - continue Eliquis  [] MONTILLA - multifactorial.; generalized weakness, big abdominal girth,  - pace activities and deep breathing exercises. Neb  [] Behcet's syndrome - chronic, stable - no sores reported per patient, - supportive care,  We reviewed all medications at length with the patient/caregiver and addressed all questions. We discussed worsening symptoms with the patient /caregiver and verbalized understanding that there are no issues or concerns at this time. The patient/caregiver is encouraged to call the [facility] 24-hour number with any questions, concerns, or issues.

## 2024-05-20 ENCOUNTER — TRANSCRIPTION ENCOUNTER (OUTPATIENT)
Age: 79
End: 2024-05-20

## 2024-05-20 ENCOUNTER — EMERGENCY (EMERGENCY)
Facility: HOSPITAL | Age: 79
LOS: 0 days | Discharge: HOME | End: 2024-05-21
Attending: EMERGENCY MEDICINE
Payer: MEDICARE

## 2024-05-20 VITALS
OXYGEN SATURATION: 97 % | SYSTOLIC BLOOD PRESSURE: 130 MMHG | HEART RATE: 52 BPM | RESPIRATION RATE: 18 BRPM | TEMPERATURE: 99 F | DIASTOLIC BLOOD PRESSURE: 74 MMHG | WEIGHT: 216.71 LBS

## 2024-05-20 DIAGNOSIS — Z88.0 ALLERGY STATUS TO PENICILLIN: ICD-10-CM

## 2024-05-20 DIAGNOSIS — M35.2 BEHCET'S DISEASE: ICD-10-CM

## 2024-05-20 DIAGNOSIS — Z88.8 ALLERGY STATUS TO OTHER DRUGS, MEDICAMENTS AND BIOLOGICAL SUBSTANCES: ICD-10-CM

## 2024-05-20 DIAGNOSIS — S16.1XXA STRAIN OF MUSCLE, FASCIA AND TENDON AT NECK LEVEL, INITIAL ENCOUNTER: ICD-10-CM

## 2024-05-20 DIAGNOSIS — M54.2 CERVICALGIA: ICD-10-CM

## 2024-05-20 DIAGNOSIS — E78.5 HYPERLIPIDEMIA, UNSPECIFIED: ICD-10-CM

## 2024-05-20 DIAGNOSIS — R51.9 HEADACHE, UNSPECIFIED: ICD-10-CM
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
## 2024-05-20 DIAGNOSIS — Z86.718 PERSONAL HISTORY OF OTHER VENOUS THROMBOSIS AND EMBOLISM: ICD-10-CM

## 2024-05-20 DIAGNOSIS — Z86.73 PERSONAL HISTORY OF TRANSIENT ISCHEMIC ATTACK (TIA), AND CEREBRAL INFARCTION WITHOUT RESIDUAL DEFICITS: ICD-10-CM
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
## 2024-05-20 DIAGNOSIS — I50.9 HEART FAILURE, UNSPECIFIED: ICD-10-CM

## 2024-05-20 DIAGNOSIS — R00.1 BRADYCARDIA, UNSPECIFIED: ICD-10-CM

## 2024-05-20 DIAGNOSIS — G44.209 TENSION-TYPE HEADACHE, UNSPECIFIED, NOT INTRACTABLE: ICD-10-CM

## 2024-05-20 DIAGNOSIS — I11.0 HYPERTENSIVE HEART DISEASE WITH HEART FAILURE: ICD-10-CM

## 2024-05-20 DIAGNOSIS — I45.10 UNSPECIFIED RIGHT BUNDLE-BRANCH BLOCK: ICD-10-CM

## 2024-05-20 PROCEDURE — 80048 BASIC METABOLIC PNL TOTAL CA: CPT

## 2024-05-20 PROCEDURE — 70450 CT HEAD/BRAIN W/O DYE: CPT | Mod: MC

## 2024-05-20 PROCEDURE — 93005 ELECTROCARDIOGRAM TRACING: CPT

## 2024-05-20 PROCEDURE — 96375 TX/PRO/DX INJ NEW DRUG ADDON: CPT

## 2024-05-20 PROCEDURE — 85025 COMPLETE CBC W/AUTO DIFF WBC: CPT

## 2024-05-20 PROCEDURE — 96374 THER/PROPH/DIAG INJ IV PUSH: CPT

## 2024-05-20 PROCEDURE — 72125 CT NECK SPINE W/O DYE: CPT | Mod: MC

## 2024-05-20 PROCEDURE — 93010 ELECTROCARDIOGRAM REPORT: CPT

## 2024-05-20 PROCEDURE — 99285 EMERGENCY DEPT VISIT HI MDM: CPT | Mod: 25

## 2024-05-20 PROCEDURE — 36415 COLL VENOUS BLD VENIPUNCTURE: CPT

## 2024-05-20 PROCEDURE — 99285 EMERGENCY DEPT VISIT HI MDM: CPT

## 2024-05-20 RX ORDER — METHOCARBAMOL 500 MG/1
1000 TABLET, FILM COATED ORAL ONCE
Refills: 0 | Status: COMPLETED | OUTPATIENT
Start: 2024-05-20 | End: 2024-05-20

## 2024-05-20 RX ORDER — ACETAMINOPHEN 500 MG
1000 TABLET ORAL ONCE
Refills: 0 | Status: COMPLETED | OUTPATIENT
Start: 2024-05-20 | End: 2024-05-20

## 2024-05-20 NOTE — ED PROVIDER NOTE - CARE PROVIDER_API CALL
Ottoniel Lao  Spine Surgery  [address] [facility] [address]  [place] [address]  Phone: [phone]  Fax: [phone]  Follow Up Time: 4-6 Days
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
## 2024-05-20 NOTE — ED PROVIDER NOTE - PHYSICAL EXAMINATION
CONSTITUTIONAL: Well appearing, awake, alert, oriented to person, place, time/situation and in no apparent distress.  · ENMT: Airway patent, Nasal mucosa clear. Mouth with normal mucosa. Throat has no vesicles, no oropharyngeal exudates and uvula is midline.  · EYES: Clear bilaterally, pupils equal, round and reactive to light.  · CARDIAC: Normal rate, regular rhythm.  Heart sounds S1, S2.  No murmurs, rubs or gallops.  · RESPIRATORY: Breath sounds clear and equal bilaterally.  · MUSCULOSKELETAL:   Tenderness to palpation of bilateral cervical extensor muscles, bilateral trapezii with spasm and severely limited range of motion in rotation of neck to either side  · NEUROLOGICAL: Alert and oriented, no focal deficits, no motor or sensory deficits.  · SKIN: Skin normal color for race, warm, dry and intact. No evidence of rash

## 2024-05-20 NOTE — ED PROVIDER NOTE - PROGRESS NOTE DETAILS
Pain is improved with some increase in mobility.  The results of the CT scans, lab and's EKG were reviewed with the patient's son.  Patient will be given strict return precautions.

## 2024-05-20 NOTE — ED PROVIDER NOTE - PATIENT PORTAL LINK FT
You can access the FollowMyHealth Patient Portal offered by [facility] by registering at the following website: [email or web address]. By joining [facility] [employer]’s FollowMyHealth portal, you will also be able to view your health information using other applications (apps) compatible with our system.

## 2024-05-20 NOTE — ED ADULT TRIAGE NOTE - CHIEF COMPLAINT QUOTE
PT presents to er with complaints of bilateral neck pain radiating upward, onset this morning, son Bel, states he took his mothers bp at home and it has been elevated all day, pt is on HTN medications and is utd with all her medications today, denies dizziness, change in vision, chest pain, palpitations, pt is on Elaquis daily and is a type 2 diabetic.

## 2024-05-20 NOTE — ED CLERICAL - NS ED CLERK NOTE PRE-ARRIVAL INFORMATION; ADDITIONAL PRE-ARRIVAL INFORMATION

## 2024-05-20 NOTE — ED PROVIDER NOTE - CARE PROVIDERS DIRECT ADDRESSES
[email or web address]

## 2024-05-20 NOTE — ED PROVIDER NOTE - NSFOLLOWUPINSTRUCTIONS_ED_ALL_ED_FT
Cervical Sprain    A cervical sprain is also called a neck sprain. It is a stretch or tear in one or more ligaments in the neck. Ligaments are tissues that connect bones to each other.    Neck sprains can be mild, bad, or very bad. A very bad sprain in the neck can cause the bones in the neck to be unstable. This can damage the spinal cord. It can also cause serious problems in the brain, spinal cord, and nerves (nervous system).    Most neck sprains heal in 4–6 weeks. It can take more or less time depending on:    What caused the injury.  The amount of injury.    What are the causes?  Neck sprains may be caused by trauma, such as:    An injury from an accident in a vehicle such as a car or boat.  A fall.  The head and neck being moved front to back or side to side all of a sudden (whiplash injury).    Mild neck sprains may be caused by wear and tear over time.    What increases the risk?  The following factors may make you more likely to develop this condition:    Taking part in activities that put you at high risk of hurting your neck. These include:    Contact sports.  Car racing.  Gymnastics.   Diving.  Taking risks when driving or riding in a vehicle such as a car or boat.   Arthritis caused by wear and tear of the joints in the spine.  The neck not being very strong or flexible.  Having had a neck injury in the past.  Poor posture.  Spending a lot of time in certain positions that put stress on the neck. This may be from sitting at a computer for a long time.    What are the signs or symptoms?  Symptoms of this condition include:    Your neck, shoulders, or upper back feeling:    Painful or sore.  Stiff.  Tender.  Swollen.  Hot, or like it is burning.  Sudden tightening of neck muscles (spasms).  Not being able to move the neck very much.  Headache.  Feeling dizzy.  Feeling like you may vomit, or vomiting.  Having a hand or arm that:    Feels weak.  Loses feeling (feels numb).  Tingles.    You may get symptoms right away after injury, or you may get them over a few days. In some cases, symptoms may go away with treatment and come back over time.    How is this treated?  This condition is treated by:    Resting your neck.  Icing the part of your neck that is hurt.  Doing exercises to restore movement and strength to your neck (physical therapy).    If there is no swelling, you may use heat therapy 2–3 days after the injury took place. If your injury is very bad, treatment may also include:    Keeping your neck in place for a length of time. This may be done using:    A neck collar. This supports your chin and the back of your head.  A cervical traction device. This is a sling that holds up your head. The sling removes weight and pressure from your neck. It may also help to relieve pain.  Medicines that help with:    Pain.  Irritation and swelling (inflammation).  Medicines that help to relax your muscles (muscle relaxants).  Surgery. This is rare.    Follow these instructions at home:      Medicines     Take over-the-counter and prescription medicines only as told by your doctor.  Ask your doctor if the medicine prescribed to you:    Requires you to avoid driving or using heavy machinery.  Can cause trouble pooping (constipation). You may need to take these actions to prevent or treat trouble pooping:    Drink enough fluid to keep your pee (urine) pale yellow.  Take over-the-counter or prescription medicines.  Eat foods that are high in fiber. These include beans, whole grains, and fresh fruits and vegetables.  Limit foods that are high in fat and sugar. These include fried or sweet foods.        If you have a neck collar:    Wear it as told by your doctor. Do not take it off unless told.  Ask your doctor before adjusting your collar.  If you have long hair, keep it outside of the collar.  Ask your doctor if you may take off the collar for cleaning and bathing. If you may take off the collar:    Follow instructions about how to take it off safely.  Clean it by hand with mild soap and water. Let it air-dry fully.  If your collar has pads that you can take out:    Take the pads out every 1–2 days.  Wash them by hand with soap and water.  Let the pads air-dry fully before you put them back in the collar.  Tell your doctor if your skin under the collar has irritation or sores.        Managing pain, stiffness, and swelling      Use a cervical traction device, if told by your doctor.  If told, put ice on the affected area. To do this:    Put ice in a plastic bag.  Place a towel between your skin and the bag.  Leave the ice on for 20 minutes, 2–3 times a day.  If told, put heat on the affected area. Do this before exercise or as often as told by your doctor. Use the heat source that your doctor recommends, such as a moist heat pack or a heating pad.    Place a towel between your skin and the heat source.  Leave the heat on for 20–30 minutes.  Take the heat off if your skin turns bright red. This is very important if you cannot feel pain, heat, or cold. You may have a greater risk of getting burned.        Activity    Do not drive while wearing a neck collar. If you do not have a neck collar, ask if it is safe to drive while your neck heals.  Do not lift anything that is heavier than 10 lb (4.5 kg), or the limit that you are told, until your doctor tells you that it is safe.  Rest as told by your doctor.  Do exercises as told by your doctor or physical therapist.  Return to your normal activities as told by your doctor. Avoid positions and activities that make you feel worse. Ask your doctor what activities are safe for you.        General instructions    Do not use any products that contain nicotine or tobacco, such as cigarettes, e-cigarettes, and chewing tobacco. These can delay healing. If you need help quitting, ask your doctor.  Keep all follow-up visits as told by your doctor or physical therapist. This is important.    How is this prevented?  To prevent a neck sprain from happening again:    Practice good posture. Adjust your workstation to help you do this.  Exercise regularly as told by your doctor or physical therapist.  Avoid activities that are risky or may cause a neck sprain.    Contact a doctor if:  Your symptoms get worse.  Your symptoms do not get better after 2 weeks of treatment.  Your pain gets worse.  Medicine does not help your pain.  You have new symptoms that you cannot explain.  Your neck collar gives you sores on your skin or bothers your skin.    Get help right away if:  You have very bad pain.  You get any of the following in any part of your body:    Loss of feeling.  Tingling.  Weakness.  You cannot move a part of your body.  You have neck pain and either of these:    Very bad dizziness.  A very bad headache.    Summary  A cervical sprain is also called a neck sprain. It is a stretch or tear in one or more ligaments in the neck. Ligaments are tissues that connect bones.  Neck sprains may be caused by trauma, such as an injury or a fall.  You may get symptoms right away after injury, or you may get them over a few days.  Neck sprains may be treated with rest, heat, ice, medicines, exercise, and surgery.    ADDITIONAL NOTES AND INSTRUCTIONS    Please follow up with your Primary MD in 24-48 hr.  Seek immediate medical care for any new/worsening signs or symptoms.

## 2024-05-20 NOTE — ED PROVIDER NOTE - OBJECTIVE STATEMENT
79-year-old female with history of CHF, DVT, CVA, Behcet's syndrome, hypertension, hyperlipidemia  brought in by son for evaluation of bilateral neck, upper back  pain causing bandlike headache since waking this morning.  The patient's son denies any recent trauma.  The patient denies any chest pain or shortness of breath.  Patient's son states that they checked her blood pressure at home with an automatic cuff which showed diastolic above 100 mmHg.  The patient denies any blurry vision, slurred speech or focal neurological deficits.  At baseline, the patient is dependent on a walker for ambulation ever since her prior stroke.

## 2024-05-20 NOTE — ED ADULT TRIAGE NOTE - INTERPRETATION SERVICES DECLINED
03/10/18 1100   Group 4   Start Time 1500   Attendance Not present      Patient/Caregiver requests family/friend to interpret.

## 2024-05-20 NOTE — ED PROVIDER NOTE - CLINICAL SUMMARY MEDICAL DECISION MAKING FREE TEXT BOX
79-year-old female with history of CHF, DVT, CVA, Behcet's syndrome, hypertension, hyperlipidemia  brought in by son for evaluation of bilateral neck, upper back  pain causing bandlike headache since waking this morning.  The patient's son denies any recent trauma.  The patient denies any chest pain or shortness of breath.  Patient's son states that they checked her blood pressure at home with an automatic cuff which showed diastolic above 100 mmHg.  The patient denies any blurry vision, slurred speech or focal neurological deficits.  At baseline, the patient is dependent on a walker for ambulation ever since her prior stroke.   Presentation most c/w cervical strain/tension headache but will get CT head and C-spine to r/o ICH or cervical fracture.  Will get EKG, labs, and treat pain with IV acetaminophen, muscle relaxers, reassess.

## 2024-05-21 ENCOUNTER — APPOINTMENT (OUTPATIENT)
Dept: HOME HEALTH SERVICES | Facility: HOME HEALTH | Age: 79
End: 2024-05-21

## 2024-05-21 VITALS
TEMPERATURE: 98 F | DIASTOLIC BLOOD PRESSURE: 64 MMHG | SYSTOLIC BLOOD PRESSURE: 135 MMHG | HEART RATE: 66 BPM | OXYGEN SATURATION: 95 % | RESPIRATION RATE: 18 BRPM

## 2024-05-21 LAB
ANION GAP SERPL CALC-SCNC: 3 MMOL/L — LOW (ref 5–17)
ANISOCYTOSIS BLD QL: SIGNIFICANT CHANGE UP
BASOPHILS # BLD AUTO: 0.03 K/UL — SIGNIFICANT CHANGE UP (ref 0–0.2)
BASOPHILS NFR BLD AUTO: 0.4 % — SIGNIFICANT CHANGE UP (ref 0–2)
BUN SERPL-MCNC: 16 MG/DL — SIGNIFICANT CHANGE UP (ref 7–23)
CALCIUM SERPL-MCNC: 9.7 MG/DL — SIGNIFICANT CHANGE UP (ref 8.5–10.1)
CHLORIDE SERPL-SCNC: 110 MMOL/L — HIGH (ref 96–108)
CO2 SERPL-SCNC: 28 MMOL/L — SIGNIFICANT CHANGE UP (ref 22–31)
CREAT SERPL-MCNC: 0.72 MG/DL — SIGNIFICANT CHANGE UP (ref 0.5–1.3)
EGFR: 86 ML/MIN/1.73M2 — SIGNIFICANT CHANGE UP
EOSINOPHIL # BLD AUTO: 0.28 K/UL — SIGNIFICANT CHANGE UP (ref 0–0.5)
EOSINOPHIL NFR BLD AUTO: 3.3 % — SIGNIFICANT CHANGE UP (ref 0–6)
GLUCOSE SERPL-MCNC: 99 MG/DL — SIGNIFICANT CHANGE UP (ref 70–99)
HCT VFR BLD CALC: 39 % — SIGNIFICANT CHANGE UP (ref 34.5–45)
HGB BLD-MCNC: 11.5 G/DL — SIGNIFICANT CHANGE UP (ref 11.5–15.5)
IMM GRANULOCYTES NFR BLD AUTO: 0.1 % — SIGNIFICANT CHANGE UP (ref 0–0.9)
LYMPHOCYTES # BLD AUTO: 2.63 K/UL — SIGNIFICANT CHANGE UP (ref 1–3.3)
LYMPHOCYTES # BLD AUTO: 30.7 % — SIGNIFICANT CHANGE UP (ref 13–44)
MANUAL SMEAR VERIFICATION: SIGNIFICANT CHANGE UP
MCHC RBC-ENTMCNC: 19.5 PG — LOW (ref 27–34)
MCHC RBC-ENTMCNC: 29.5 GM/DL — LOW (ref 32–36)
MCV RBC AUTO: 66.1 FL — LOW (ref 80–100)
MICROCYTES BLD QL: SIGNIFICANT CHANGE UP
MONOCYTES # BLD AUTO: 0.64 K/UL — SIGNIFICANT CHANGE UP (ref 0–0.9)
MONOCYTES NFR BLD AUTO: 7.5 % — SIGNIFICANT CHANGE UP (ref 2–14)
NEUTROPHILS # BLD AUTO: 4.97 K/UL — SIGNIFICANT CHANGE UP (ref 1.8–7.4)
NEUTROPHILS NFR BLD AUTO: 58 % — SIGNIFICANT CHANGE UP (ref 43–77)
PLAT MORPH BLD: NORMAL — SIGNIFICANT CHANGE UP
PLATELET # BLD AUTO: 190 K/UL — SIGNIFICANT CHANGE UP (ref 150–400)
POIKILOCYTOSIS BLD QL AUTO: SLIGHT — SIGNIFICANT CHANGE UP
POLYCHROMASIA BLD QL SMEAR: SLIGHT — SIGNIFICANT CHANGE UP
POTASSIUM SERPL-MCNC: 4.8 MMOL/L — SIGNIFICANT CHANGE UP (ref 3.5–5.3)
POTASSIUM SERPL-SCNC: 4.8 MMOL/L — SIGNIFICANT CHANGE UP (ref 3.5–5.3)
RBC # BLD: 5.9 M/UL — HIGH (ref 3.8–5.2)
RBC # FLD: 16.4 % — HIGH (ref 10.3–14.5)
RBC BLD AUTO: ABNORMAL
SCHISTOCYTES BLD QL AUTO: SLIGHT — SIGNIFICANT CHANGE UP
SODIUM SERPL-SCNC: 141 MMOL/L — SIGNIFICANT CHANGE UP (ref 135–145)
WBC # BLD: 8.56 K/UL — SIGNIFICANT CHANGE UP (ref 3.8–10.5)
WBC # FLD AUTO: 8.56 K/UL — SIGNIFICANT CHANGE UP (ref 3.8–10.5)

## 2024-05-21 PROCEDURE — 70450 CT HEAD/BRAIN W/O DYE: CPT | Mod: 26,MC

## 2024-05-21 PROCEDURE — 72125 CT NECK SPINE W/O DYE: CPT | Mod: 26,MC

## 2024-05-21 RX ORDER — DEXAMETHASONE 0.5 MG/5ML
6 ELIXIR ORAL ONCE
Refills: 0 | Status: COMPLETED | OUTPATIENT
Start: 2024-05-21 | End: 2024-05-21

## 2024-05-21 RX ORDER — LIDOCAINE 4 G/100G
1 CREAM TOPICAL ONCE
Refills: 0 | Status: COMPLETED | OUTPATIENT
Start: 2024-05-21 | End: 2024-05-21

## 2024-05-21 RX ADMIN — Medication 1000 MILLIGRAM(S): at 02:18

## 2024-05-21 RX ADMIN — METHOCARBAMOL 1000 MILLIGRAM(S): 500 TABLET, FILM COATED ORAL at 00:46

## 2024-05-21 RX ADMIN — Medication 400 MILLIGRAM(S): at 00:43

## 2024-05-21 RX ADMIN — Medication 0.5 MILLIGRAM(S): at 02:17

## 2024-05-21 RX ADMIN — Medication 100 MILLIGRAM(S): at 00:46

## 2024-05-21 RX ADMIN — LIDOCAINE 1 PATCH: 4 CREAM TOPICAL at 04:22

## 2024-05-21 RX ADMIN — Medication 6 MILLIGRAM(S): at 02:17

## 2024-05-21 NOTE — ED ADULT NURSE NOTE - OBJECTIVE STATEMENT
Pt is 79y female, A&Ox4, breathing unlabored, BIB son presenting with c/o b/l neck, upper back  pain causing bandlike headache since waking this morning.  The patient's son denies any recent trauma.  The patient denies any chest pain or shortness of breath.  Patient's son states that they checked her blood pressure at home with an automatic cuff which showed diastolic above 100 mmHg.  The patient denies any blurry vision, slurred speech or focal neurological deficits.  At baseline, the patient is dependent on a walker for ambulation since prior stroke.

## 2024-05-21 NOTE — ED ADULT NURSE NOTE - NSFALLUNIVINTERV_ED_ALL_ED
Bed/Stretcher in lowest position, wheels locked, appropriate side rails in place/Call bell, personal items and telephone in reach/Instruct patient to call for assistance before getting out of bed/chair/stretcher/Non-slip footwear applied when patient is off stretcher/[facility] to call system/Physically safe environment - no spills, clutter or unnecessary equipment/Purposeful proactive rounding/Room/bathroom lighting operational, light cord in reach

## 2024-08-06 ENCOUNTER — RX RENEWAL (OUTPATIENT)
Age: 79
End: 2024-08-06

## 2024-11-25 ENCOUNTER — APPOINTMENT (OUTPATIENT)
Dept: HOME HEALTH SERVICES | Facility: HOME HEALTH | Age: 79
End: 2024-11-25
Payer: MEDICARE

## 2024-11-25 VITALS
HEART RATE: 56 BPM | DIASTOLIC BLOOD PRESSURE: 80 MMHG | OXYGEN SATURATION: 97 % | RESPIRATION RATE: 16 BRPM | SYSTOLIC BLOOD PRESSURE: 138 MMHG

## 2024-11-25 DIAGNOSIS — I50.32 CHRONIC DIASTOLIC (CONGESTIVE) HEART FAILURE: ICD-10-CM

## 2024-11-25 DIAGNOSIS — E78.5 HYPERLIPIDEMIA, UNSPECIFIED: ICD-10-CM

## 2024-11-25 DIAGNOSIS — I82.409 ACUTE EMBOLISM AND THROMBOSIS OF UNSPECIFIED DEEP VEINS OF UNSPECIFIED LOWER EXTREMITY: ICD-10-CM

## 2024-11-25 DIAGNOSIS — I10 ESSENTIAL (PRIMARY) HYPERTENSION: ICD-10-CM

## 2024-11-25 PROCEDURE — 99349 HOME/RES VST EST MOD MDM 40: CPT

## 2024-11-25 RX ORDER — SEMAGLUTIDE 0.25 MG/.5ML
0.25 INJECTION, SOLUTION SUBCUTANEOUS WEEKLY
Refills: 0 | Status: ACTIVE | COMMUNITY
Start: 2024-11-25

## 2024-11-25 RX ORDER — PANTOPRAZOLE 40 MG/1
40 TABLET, DELAYED RELEASE ORAL DAILY
Qty: 90 | Refills: 3 | Status: ACTIVE | COMMUNITY
Start: 2024-11-25

## 2024-11-26 NOTE — ED PROVIDER NOTE - OBJECTIVE STATEMENT
Patient is a 74 yo female who was discharged from [facility] 2 days ago for TIA; patient reports that she originally presented to ED with numbness to her right side and "not speaking"- patient reports that while she was in the hospital- her speech improved but she continued to have intermittent numbness; patient was discharged from the hospital and has continued to have intermittent numbness to right side; no weakness contrary to triage; also with ?lightheadedness since discharge; no chest pain; no sob; no abdominal pain; no headache; no trauma or injury; hx of DVT in [facility] x 12 years- on coumadin since that time.
16

## 2025-01-14 ENCOUNTER — APPOINTMENT (OUTPATIENT)
Dept: HOME HEALTH SERVICES | Facility: HOME HEALTH | Age: 80
End: 2025-01-14

## 2025-01-14 ENCOUNTER — APPOINTMENT (OUTPATIENT)
Dept: HOME HEALTH SERVICES | Facility: HOME HEALTH | Age: 80
End: 2025-01-14
Payer: MEDICARE

## 2025-01-14 ENCOUNTER — NON-APPOINTMENT (OUTPATIENT)
Age: 80
End: 2025-01-14

## 2025-01-14 VITALS
OXYGEN SATURATION: 99 % | DIASTOLIC BLOOD PRESSURE: 70 MMHG | SYSTOLIC BLOOD PRESSURE: 140 MMHG | TEMPERATURE: 98.2 F | HEART RATE: 58 BPM

## 2025-01-14 VITALS
DIASTOLIC BLOOD PRESSURE: 60 MMHG | RESPIRATION RATE: 16 BRPM | HEART RATE: 60 BPM | SYSTOLIC BLOOD PRESSURE: 122 MMHG | OXYGEN SATURATION: 98 %

## 2025-01-14 DIAGNOSIS — R39.9 UNSPECIFIED SYMPTOMS AND SIGNS INVOLVING THE GENITOURINARY SYSTEM: ICD-10-CM

## 2025-01-14 DIAGNOSIS — I10 ESSENTIAL (PRIMARY) HYPERTENSION: ICD-10-CM

## 2025-01-14 DIAGNOSIS — I50.32 CHRONIC DIASTOLIC (CONGESTIVE) HEART FAILURE: ICD-10-CM

## 2025-01-14 DIAGNOSIS — I82.409 ACUTE EMBOLISM AND THROMBOSIS OF UNSPECIFIED DEEP VEINS OF UNSPECIFIED LOWER EXTREMITY: ICD-10-CM

## 2025-01-14 PROCEDURE — 99349 HOME/RES VST EST MOD MDM 40: CPT

## 2025-01-15 ENCOUNTER — LABORATORY RESULT (OUTPATIENT)
Age: 80
End: 2025-01-15

## 2025-01-16 ENCOUNTER — LABORATORY RESULT (OUTPATIENT)
Age: 80
End: 2025-01-16

## 2025-01-17 ENCOUNTER — LABORATORY RESULT (OUTPATIENT)
Age: 80
End: 2025-01-17

## 2025-02-24 ENCOUNTER — APPOINTMENT (OUTPATIENT)
Dept: HOME HEALTH SERVICES | Facility: HOME HEALTH | Age: 80
End: 2025-02-24
Payer: MEDICARE

## 2025-02-24 VITALS
RESPIRATION RATE: 16 BRPM | SYSTOLIC BLOOD PRESSURE: 138 MMHG | HEART RATE: 52 BPM | OXYGEN SATURATION: 98 % | DIASTOLIC BLOOD PRESSURE: 72 MMHG

## 2025-02-24 DIAGNOSIS — I82.409 ACUTE EMBOLISM AND THROMBOSIS OF UNSPECIFIED DEEP VEINS OF UNSPECIFIED LOWER EXTREMITY: ICD-10-CM

## 2025-02-24 DIAGNOSIS — R00.1 BRADYCARDIA, UNSPECIFIED: ICD-10-CM

## 2025-02-24 DIAGNOSIS — I10 ESSENTIAL (PRIMARY) HYPERTENSION: ICD-10-CM

## 2025-02-24 DIAGNOSIS — I50.32 CHRONIC DIASTOLIC (CONGESTIVE) HEART FAILURE: ICD-10-CM

## 2025-02-24 DIAGNOSIS — D68.69 OTHER THROMBOPHILIA: ICD-10-CM

## 2025-02-24 PROCEDURE — 99349 HOME/RES VST EST MOD MDM 40: CPT

## 2025-03-19 NOTE — PHYSICAL THERAPY INITIAL EVALUATION ADULT - WEIGHT-BEARING RESTRICTIONS: GAIT, REHAB EVAL
Rx Refill Note  Requested Prescriptions     Pending Prescriptions Disp Refills    topiramate (Topamax) 25 MG tablet 60 tablet 0     Sig: Take 1 tablet by mouth Daily.      Last office visit with prescribing clinician: 2/18/2025   Last telemedicine visit with prescribing clinician: Visit date not found   Next office visit with prescribing clinician: 4/22/2025                         Would you like a call back once the refill request has been completed: [] Yes [] No    If the office needs to give you a call back, can they leave a voicemail: [] Yes [] No    Joy Church MA  03/19/25, 16:18 CDT  
full weight-bearing

## 2025-04-03 NOTE — ED ADULT NURSE NOTE - CAS TRG GENERAL AIRWAY, MLM
Phone message left regarding scheduled upcoming GI procedure.     Place of procedure: [facility] [place]   Arrival Time: 1200  Procedure Time:1315  Prep Type: golytely    Below is some helpful information about your upcoming colonoscopy.    It is ok to drink clear liquids up until 0915.  Some examples of clear liquids include: water, apple juice, jello or black coffee.   Please don’t drink anything with pulp such as, orange juice.    Please take the following medication the morning of surgery: n/a.  Bring any cases for your contact lens, dentures, partials or glasses.  Leave any valuables at home and remove all jewelry prior to your arrival.  Please don't wear any make-up or hair product the morning of surgery.    Bring your insurance card and a photo ID.  Make sure you have arranged for someone to bring you home. Yes/No: as described in voicemail.    If you were to become ill prior to your surgery, please contact your family care physician.  The quality of your stay is important to us.  We want to ensure you have excellent care during your stay.  Please don’t hesitate to call with any questions about your procedure at [phone] (hours of operation are 7am-4pm Monday-Friday).  We look forward to caring for you!    
Patent

## 2025-04-11 ENCOUNTER — APPOINTMENT (OUTPATIENT)
Dept: HOME HEALTH SERVICES | Facility: HOME HEALTH | Age: 80
End: 2025-04-11

## 2025-04-11 VITALS
HEART RATE: 46 BPM | RESPIRATION RATE: 18 BRPM | OXYGEN SATURATION: 98 % | SYSTOLIC BLOOD PRESSURE: 130 MMHG | DIASTOLIC BLOOD PRESSURE: 75 MMHG

## 2025-04-11 RX ORDER — PREDNISONE 2.5 MG/1
2.5 TABLET ORAL
Qty: 90 | Refills: 0 | Status: ACTIVE | COMMUNITY
Start: 2025-04-11

## 2025-04-11 RX ORDER — ASPIRIN 81 MG/1
81 TABLET, COATED ORAL DAILY
Refills: 0 | Status: ACTIVE | COMMUNITY
Start: 2025-04-11

## 2025-04-11 RX ORDER — FUROSEMIDE 20 MG/1
20 TABLET ORAL
Qty: 90 | Refills: 1 | Status: ACTIVE | COMMUNITY
Start: 2025-04-11

## 2025-04-17 NOTE — CURRENT MEDS
Left VM for the pt regarding a colonoscopy referral we received from CICI Espinosa--Will try again to reach the pt to go over the colonoscopy screening questions. 1st attempt.     *IF PT RETURNS THIS CALL, PLEASE SEND THE CALL TO THE OFFICE. ASK FOR KIRSTEN     [Medication and Allergies Reconciled] : medication and allergies reconciled [High Risk Medications Reviewed and Reconciled (Beers Criteria)] : high risk medications reviewed, reconciled [Reviewed patient reported medication adherence from Comprehensive Assessment] : reviewed patient reported medication adherence from comprehensive assessment [Adherentt to medications as prescribed] : the patient is adherent to medications as prescribed [de-identified] : [facility] manages medications

## 2025-05-28 NOTE — ED PROVIDER NOTE - NS ED MD DISPO DIVISION
Vincenzo Lowry)  Pediatric Surgery  [address] [address] [place] [id number]  Phone: [phone]  Fax: [phone]  Follow Up Time: 2 weeks  
Bipin